# Patient Record
Sex: FEMALE | Race: WHITE | NOT HISPANIC OR LATINO
[De-identification: names, ages, dates, MRNs, and addresses within clinical notes are randomized per-mention and may not be internally consistent; named-entity substitution may affect disease eponyms.]

---

## 2017-05-15 ENCOUNTER — APPOINTMENT (OUTPATIENT)
Dept: ORTHOPEDIC SURGERY | Facility: CLINIC | Age: 77
End: 2017-05-15

## 2017-05-17 ENCOUNTER — APPOINTMENT (OUTPATIENT)
Dept: ORTHOPEDIC SURGERY | Facility: CLINIC | Age: 77
End: 2017-05-17

## 2017-09-25 ENCOUNTER — APPOINTMENT (OUTPATIENT)
Dept: ORTHOPEDIC SURGERY | Facility: CLINIC | Age: 77
End: 2017-09-25

## 2017-11-27 ENCOUNTER — APPOINTMENT (OUTPATIENT)
Dept: ORTHOPEDIC SURGERY | Facility: CLINIC | Age: 77
End: 2017-11-27
Payer: MEDICARE

## 2017-11-27 VITALS
HEART RATE: 74 BPM | HEIGHT: 61 IN | SYSTOLIC BLOOD PRESSURE: 129 MMHG | BODY MASS INDEX: 19.83 KG/M2 | DIASTOLIC BLOOD PRESSURE: 84 MMHG | WEIGHT: 105 LBS

## 2017-11-27 PROCEDURE — 72110 X-RAY EXAM L-2 SPINE 4/>VWS: CPT

## 2017-11-27 PROCEDURE — 99213 OFFICE O/P EST LOW 20 MIN: CPT

## 2018-01-22 ENCOUNTER — APPOINTMENT (OUTPATIENT)
Dept: ORTHOPEDIC SURGERY | Facility: CLINIC | Age: 78
End: 2018-01-22

## 2018-01-29 ENCOUNTER — CHART COPY (OUTPATIENT)
Age: 78
End: 2018-01-29

## 2018-02-28 ENCOUNTER — APPOINTMENT (OUTPATIENT)
Dept: ORTHOPEDIC SURGERY | Facility: CLINIC | Age: 78
End: 2018-02-28
Payer: MEDICARE

## 2018-02-28 VITALS
HEART RATE: 71 BPM | DIASTOLIC BLOOD PRESSURE: 77 MMHG | HEIGHT: 61 IN | WEIGHT: 110 LBS | SYSTOLIC BLOOD PRESSURE: 125 MMHG | BODY MASS INDEX: 20.77 KG/M2

## 2018-02-28 PROCEDURE — 99213 OFFICE O/P EST LOW 20 MIN: CPT

## 2018-11-14 ENCOUNTER — RESULT REVIEW (OUTPATIENT)
Age: 78
End: 2018-11-14

## 2019-04-19 ENCOUNTER — EMERGENCY (EMERGENCY)
Facility: HOSPITAL | Age: 79
LOS: 0 days | Discharge: HOME | End: 2019-04-19
Attending: EMERGENCY MEDICINE | Admitting: EMERGENCY MEDICINE
Payer: MEDICARE

## 2019-04-19 VITALS
WEIGHT: 119.93 LBS | OXYGEN SATURATION: 97 % | DIASTOLIC BLOOD PRESSURE: 69 MMHG | HEIGHT: 63 IN | SYSTOLIC BLOOD PRESSURE: 134 MMHG | RESPIRATION RATE: 17 BRPM | TEMPERATURE: 97 F | HEART RATE: 65 BPM

## 2019-04-19 VITALS
TEMPERATURE: 97 F | SYSTOLIC BLOOD PRESSURE: 141 MMHG | HEART RATE: 73 BPM | OXYGEN SATURATION: 97 % | RESPIRATION RATE: 18 BRPM | DIASTOLIC BLOOD PRESSURE: 81 MMHG

## 2019-04-19 DIAGNOSIS — Z98.82 BREAST IMPLANT STATUS: Chronic | ICD-10-CM

## 2019-04-19 DIAGNOSIS — Z98.89 OTHER SPECIFIED POSTPROCEDURAL STATES: Chronic | ICD-10-CM

## 2019-04-19 DIAGNOSIS — G89.29 OTHER CHRONIC PAIN: ICD-10-CM

## 2019-04-19 DIAGNOSIS — I44.0 ATRIOVENTRICULAR BLOCK, FIRST DEGREE: ICD-10-CM

## 2019-04-19 DIAGNOSIS — M51.36 OTHER INTERVERTEBRAL DISC DEGENERATION, LUMBAR REGION: ICD-10-CM

## 2019-04-19 DIAGNOSIS — E03.9 HYPOTHYROIDISM, UNSPECIFIED: ICD-10-CM

## 2019-04-19 DIAGNOSIS — Z90.710 ACQUIRED ABSENCE OF BOTH CERVIX AND UTERUS: Chronic | ICD-10-CM

## 2019-04-19 DIAGNOSIS — R55 SYNCOPE AND COLLAPSE: ICD-10-CM

## 2019-04-19 DIAGNOSIS — E78.5 HYPERLIPIDEMIA, UNSPECIFIED: ICD-10-CM

## 2019-04-19 DIAGNOSIS — Z90.710 ACQUIRED ABSENCE OF BOTH CERVIX AND UTERUS: ICD-10-CM

## 2019-04-19 LAB
ALBUMIN SERPL ELPH-MCNC: 3.6 G/DL — SIGNIFICANT CHANGE UP (ref 3.3–5)
ALP SERPL-CCNC: 76 U/L — SIGNIFICANT CHANGE UP (ref 40–120)
ALT FLD-CCNC: 32 U/L — SIGNIFICANT CHANGE UP (ref 12–78)
ANION GAP SERPL CALC-SCNC: 6 MMOL/L — SIGNIFICANT CHANGE UP (ref 5–17)
APTT BLD: 28.1 SEC — SIGNIFICANT CHANGE UP (ref 27.5–36.3)
AST SERPL-CCNC: 34 U/L — SIGNIFICANT CHANGE UP (ref 15–37)
BASOPHILS # BLD AUTO: 0.06 K/UL — SIGNIFICANT CHANGE UP (ref 0–0.2)
BASOPHILS NFR BLD AUTO: 0.8 % — SIGNIFICANT CHANGE UP (ref 0–2)
BILIRUB SERPL-MCNC: 0.2 MG/DL — SIGNIFICANT CHANGE UP (ref 0.2–1.2)
BUN SERPL-MCNC: 25 MG/DL — HIGH (ref 7–23)
CALCIUM SERPL-MCNC: 8.8 MG/DL — SIGNIFICANT CHANGE UP (ref 8.5–10.1)
CHLORIDE SERPL-SCNC: 106 MMOL/L — SIGNIFICANT CHANGE UP (ref 96–108)
CO2 SERPL-SCNC: 26 MMOL/L — SIGNIFICANT CHANGE UP (ref 22–31)
CREAT SERPL-MCNC: 0.81 MG/DL — SIGNIFICANT CHANGE UP (ref 0.5–1.3)
EOSINOPHIL # BLD AUTO: 0.52 K/UL — HIGH (ref 0–0.5)
EOSINOPHIL NFR BLD AUTO: 6.5 % — HIGH (ref 0–6)
GLUCOSE SERPL-MCNC: 106 MG/DL — HIGH (ref 70–99)
HCT VFR BLD CALC: 38 % — SIGNIFICANT CHANGE UP (ref 34.5–45)
HGB BLD-MCNC: 12.3 G/DL — SIGNIFICANT CHANGE UP (ref 11.5–15.5)
IMM GRANULOCYTES NFR BLD AUTO: 0.5 % — SIGNIFICANT CHANGE UP (ref 0–1.5)
INR BLD: 1 RATIO — SIGNIFICANT CHANGE UP (ref 0.88–1.16)
LYMPHOCYTES # BLD AUTO: 1.43 K/UL — SIGNIFICANT CHANGE UP (ref 1–3.3)
LYMPHOCYTES # BLD AUTO: 17.9 % — SIGNIFICANT CHANGE UP (ref 13–44)
MCHC RBC-ENTMCNC: 28.3 PG — SIGNIFICANT CHANGE UP (ref 27–34)
MCHC RBC-ENTMCNC: 32.4 GM/DL — SIGNIFICANT CHANGE UP (ref 32–36)
MCV RBC AUTO: 87.6 FL — SIGNIFICANT CHANGE UP (ref 80–100)
MONOCYTES # BLD AUTO: 0.68 K/UL — SIGNIFICANT CHANGE UP (ref 0–0.9)
MONOCYTES NFR BLD AUTO: 8.5 % — SIGNIFICANT CHANGE UP (ref 2–14)
NEUTROPHILS # BLD AUTO: 5.26 K/UL — SIGNIFICANT CHANGE UP (ref 1.8–7.4)
NEUTROPHILS NFR BLD AUTO: 65.8 % — SIGNIFICANT CHANGE UP (ref 43–77)
NRBC # BLD: 0 /100 WBCS — SIGNIFICANT CHANGE UP (ref 0–0)
PLATELET # BLD AUTO: 269 K/UL — SIGNIFICANT CHANGE UP (ref 150–400)
POTASSIUM SERPL-MCNC: 4.4 MMOL/L — SIGNIFICANT CHANGE UP (ref 3.5–5.3)
POTASSIUM SERPL-SCNC: 4.4 MMOL/L — SIGNIFICANT CHANGE UP (ref 3.5–5.3)
PROT SERPL-MCNC: 7 GM/DL — SIGNIFICANT CHANGE UP (ref 6–8.3)
PROTHROM AB SERPL-ACNC: 11.1 SEC — SIGNIFICANT CHANGE UP (ref 10–12.9)
RBC # BLD: 4.34 M/UL — SIGNIFICANT CHANGE UP (ref 3.8–5.2)
RBC # FLD: 15.3 % — HIGH (ref 10.3–14.5)
SODIUM SERPL-SCNC: 138 MMOL/L — SIGNIFICANT CHANGE UP (ref 135–145)
TROPONIN I SERPL-MCNC: <0.015 NG/ML — SIGNIFICANT CHANGE UP (ref 0.01–0.04)
WBC # BLD: 7.99 K/UL — SIGNIFICANT CHANGE UP (ref 3.8–10.5)
WBC # FLD AUTO: 7.99 K/UL — SIGNIFICANT CHANGE UP (ref 3.8–10.5)

## 2019-04-19 PROCEDURE — 99285 EMERGENCY DEPT VISIT HI MDM: CPT

## 2019-04-19 PROCEDURE — 93010 ELECTROCARDIOGRAM REPORT: CPT

## 2019-04-19 PROCEDURE — 71045 X-RAY EXAM CHEST 1 VIEW: CPT | Mod: 26

## 2019-04-19 PROCEDURE — 93926 LOWER EXTREMITY STUDY: CPT | Mod: 26,RT

## 2019-04-19 RX ORDER — ACETAMINOPHEN 500 MG
650 TABLET ORAL ONCE
Qty: 0 | Refills: 0 | Status: COMPLETED | OUTPATIENT
Start: 2019-04-19 | End: 2019-04-19

## 2019-04-19 RX ADMIN — Medication 650 MILLIGRAM(S): at 22:13

## 2019-04-19 NOTE — ED PROVIDER NOTE - OBJECTIVE STATEMENT
78 y/o Female brought to the ED via EMS with friends who report that pt. developed chest tightness while eating dinner this evening.  Took 1 SL NTG tablet and immediately felt nausea and dizziness.  Per friends, pt's head slumped forward.  No fall from chair.  (+) LOC for few seconds.  Neg seizure activity.  Friends lifted pt's head and she became alert s/p few seconds.  Reports HA now.

## 2019-04-19 NOTE — ED PROVIDER NOTE - GASTROINTESTINAL, MLM
Abdomen soft, non-tender, no guarding.      Right groin with 1.5 cm, mobile, non-tender area s/p cath insertion. Neg erythema.  Neg fluctuance.

## 2019-04-19 NOTE — ED PROVIDER NOTE - PROGRESS NOTE DETAILS
Discussed case with Dr. Damon.  He placed call to pt.'s Cardiologist (dr. Ramirez) through [facility].  Yanira Shetty PA-C D/W Ashley ([phone]) -- confirms recent cath with patent stent.  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  Would agree with obs overnight, serial trops. D/W Ashley ([phone]) -- confirms recent cath with patent stent.  Likely has microvascular angina, has had similar episode in the past related to nitrates, calcium channel blockers, dehydration.  Provided trop and workup negative, would be comfortable with discharge.  Patient strongly wishes to be discharged.  Currently asymptomatic.  Likely discharge.

## 2019-04-19 NOTE — ED PROVIDER NOTE - NSFOLLOWUPINSTRUCTIONS_ED_ALL_ED_FT
Follow-up with Dr. Ramirez  Return with any new/recurrent symptoms.   See printed "syncope" instructions

## 2019-04-19 NOTE — ED PROVIDER NOTE - CLINICAL SUMMARY MEDICAL DECISION MAKING FREE TEXT BOX
syncope - ?rel to nitrates, ?ischemia, ?dehydration  D/w cardiology -- agrees with fluids, trop overnight. syncope - ?rel to nitrates, ?ischemia, ?dehydration  Labs, CXR, ECG

## 2019-04-19 NOTE — ED PROVIDER NOTE - ATTENDING CONTRIBUTION TO CARE
79F BIBA s/p syncope -- took NTG for chest pain, felt dizzy and had brief syncopal episode.  Similar episodes in the past.  Presently asymptomatic.  Plan: labs, ECG, contact patient's cardiologist

## 2019-04-19 NOTE — ED ADULT NURSE NOTE - NSSUSCREENINGQ5_ED_ALL_ED
pt ambulated from rm 17 to the bathroom s any difficulty.  bl equal sensation to touch and pin prick to lower extremities. No

## 2019-04-19 NOTE — ED PROVIDER NOTE - PMH
Chronic pain    Constipation    Degenerative disc disease, lumbar    Dry eye of left side    Dyslipidemia    H/O corneal abrasion  left  Hypothyroidism    Insomnia

## 2019-04-19 NOTE — ED ADULT NURSE NOTE - NSIMPLEMENTINTERV_GEN_ALL_ED
Implemented All Fall Risk Interventions:  [place] to call system. Call bell, personal items and telephone within reach. Instruct patient to call for assistance. Room bathroom lighting operational. Non-slip footwear when patient is off stretcher. Physically safe environment: no spills, clutter or unnecessary equipment. Stretcher in lowest position, wheels locked, appropriate side rails in place. Provide visual cue, wrist band, yellow gown, etc. Monitor gait and stability. Monitor for mental status changes and reorient to person, place, and time. Review medications for side effects contributing to fall risk. Reinforce activity limits and safety measures with patient and family.

## 2019-04-19 NOTE — ED PROVIDER NOTE - PSH
S/P breast augmentation  29 ya, reconstruction 2015  S/P eye surgery  49 ya, blepharoplasty  S/P hysterectomy  age 39  S/P sinus surgery  19 ya  S/P tendon repair  left hand, 2015

## 2019-04-19 NOTE — ED ADULT NURSE NOTE - CHIEF COMPLAINT QUOTE
Pt presents to ER s/p syncopal episode. pt reports having an argument at dinner and then feeling chest discomfort; pt took one sublingual nitro and then felt dizzy and passed out for a few seconds in a chair. Event was witnessed by family. Denies fall

## 2019-04-19 NOTE — ED ADULT NURSE REASSESSMENT NOTE - NS ED NURSE REASSESS COMMENT FT1
report received from previous RN. patient resting comfortably in stretcher in no acute distress. A&Ox4. color good. skin warm and dry. respirations even and unlabored. NSR 60-70s on CM. patient awaiting sono results then pending discharge. medicated as ordered for headache. VSS. call bell within reach. family at bedside.

## 2019-07-08 ENCOUNTER — APPOINTMENT (OUTPATIENT)
Dept: ORTHOPEDIC SURGERY | Facility: CLINIC | Age: 79
End: 2019-07-08

## 2019-07-22 ENCOUNTER — APPOINTMENT (OUTPATIENT)
Dept: ORTHOPEDIC SURGERY | Facility: CLINIC | Age: 79
End: 2019-07-22
Payer: MEDICARE

## 2019-07-22 PROCEDURE — 20551 NJX 1 TENDON ORIGIN/INSJ: CPT

## 2019-07-22 PROCEDURE — 99214 OFFICE O/P EST MOD 30 MIN: CPT | Mod: 25

## 2019-07-24 RX ORDER — LIDOCAINE 5% 700 MG/1
5 PATCH TOPICAL
Qty: 20 | Refills: 0 | Status: ACTIVE | COMMUNITY
Start: 2019-07-24 | End: 1900-01-01

## 2019-07-25 ENCOUNTER — CHART COPY (OUTPATIENT)
Age: 79
End: 2019-07-25

## 2020-01-15 ENCOUNTER — APPOINTMENT (OUTPATIENT)
Dept: ORTHOPEDIC SURGERY | Facility: CLINIC | Age: 80
End: 2020-01-15

## 2020-03-09 ENCOUNTER — APPOINTMENT (OUTPATIENT)
Dept: OPHTHALMOLOGY | Facility: CLINIC | Age: 80
End: 2020-03-09

## 2020-06-23 ENCOUNTER — RESULT REVIEW (OUTPATIENT)
Age: 80
End: 2020-06-23

## 2020-09-10 ENCOUNTER — RECORD ABSTRACTING (OUTPATIENT)
Age: 80
End: 2020-09-10

## 2020-09-10 ENCOUNTER — APPOINTMENT (OUTPATIENT)
Dept: OTOLARYNGOLOGY | Facility: CLINIC | Age: 80
End: 2020-09-10

## 2020-09-10 DIAGNOSIS — Z86.79 PERSONAL HISTORY OF OTHER DISEASES OF THE CIRCULATORY SYSTEM: ICD-10-CM

## 2020-09-10 DIAGNOSIS — Z86.2 PERSONAL HISTORY OF DISEASES OF THE BLOOD AND BLOOD-FORMING ORGANS AND CERTAIN DISORDERS INVOLVING THE IMMUNE MECHANISM: ICD-10-CM

## 2020-09-10 DIAGNOSIS — J31.0 CHRONIC RHINITIS: ICD-10-CM

## 2020-09-10 DIAGNOSIS — Z87.09 PERSONAL HISTORY OF OTHER DISEASES OF THE RESPIRATORY SYSTEM: ICD-10-CM

## 2020-09-10 DIAGNOSIS — H61.21 IMPACTED CERUMEN, RIGHT EAR: ICD-10-CM

## 2020-09-10 RX ORDER — METOPROLOL SUCCINATE 100 MG/1
100 TABLET, EXTENDED RELEASE ORAL
Refills: 0 | Status: ACTIVE | COMMUNITY

## 2020-09-10 RX ORDER — PANTOPRAZOLE 40 MG/1
40 TABLET, DELAYED RELEASE ORAL
Refills: 0 | Status: ACTIVE | COMMUNITY

## 2020-09-10 RX ORDER — GABAPENTIN 600 MG/1
600 TABLET, COATED ORAL
Refills: 0 | Status: ACTIVE | COMMUNITY

## 2020-09-10 RX ORDER — RANITIDINE 150 MG/1
150 TABLET ORAL
Refills: 0 | Status: ACTIVE | COMMUNITY

## 2020-09-10 RX ORDER — MUPIROCIN 20 MG/G
2 OINTMENT TOPICAL
Refills: 0 | Status: ACTIVE | COMMUNITY

## 2020-09-10 RX ORDER — LINACLOTIDE 145 UG/1
145 CAPSULE, GELATIN COATED ORAL
Refills: 0 | Status: ACTIVE | COMMUNITY

## 2020-09-10 RX ORDER — TICAGRELOR 90 MG/1
90 TABLET ORAL
Refills: 0 | Status: ACTIVE | COMMUNITY

## 2020-09-10 RX ORDER — TIZANIDINE 4 MG/1
4 TABLET ORAL
Refills: 0 | Status: ACTIVE | COMMUNITY

## 2020-09-10 RX ORDER — LEVOTHYROXINE SODIUM 0.07 MG/1
75 TABLET ORAL
Refills: 0 | Status: ACTIVE | COMMUNITY

## 2020-09-10 RX ORDER — SIMVASTATIN 20 MG/1
20 TABLET, FILM COATED ORAL
Refills: 0 | Status: ACTIVE | COMMUNITY

## 2020-09-17 ENCOUNTER — APPOINTMENT (OUTPATIENT)
Dept: OTOLARYNGOLOGY | Facility: CLINIC | Age: 80
End: 2020-09-17

## 2020-09-24 ENCOUNTER — APPOINTMENT (OUTPATIENT)
Dept: OTOLARYNGOLOGY | Facility: CLINIC | Age: 80
End: 2020-09-24
Payer: MEDICARE

## 2020-09-24 VITALS
HEIGHT: 61 IN | TEMPERATURE: 96.8 F | WEIGHT: 110 LBS | BODY MASS INDEX: 20.77 KG/M2 | DIASTOLIC BLOOD PRESSURE: 85 MMHG | HEART RATE: 67 BPM | SYSTOLIC BLOOD PRESSURE: 132 MMHG

## 2020-09-24 DIAGNOSIS — J34.2 DEVIATED NASAL SEPTUM: ICD-10-CM

## 2020-09-24 PROCEDURE — 30903 CONTROL OF NOSEBLEED: CPT

## 2020-09-24 PROCEDURE — 99214 OFFICE O/P EST MOD 30 MIN: CPT | Mod: 25

## 2020-09-24 NOTE — PHYSICAL EXAM
[Normal] : mucosa is normal [Midline] : trachea located in midline position [de-identified] : scarred face/ narrow vestbules/ atrophic rhinitis/ exposed Kisselbach vessels/ cauterisation done

## 2020-09-24 NOTE — HISTORY OF PRESENT ILLNESS
[de-identified] : recurrent left sided nose bleed for a while\par taking ASA\par medical hx reviewed\par hx of sinus surgery

## 2020-09-24 NOTE — ASSESSMENT
[FreeTextEntry1] : recurrent left epistaxis\par coagulopathy\par cauterisation done\par mupirocin bid\par f/u 10 days\par will consider CT due to previous surgery

## 2020-09-24 NOTE — REVIEW OF SYSTEMS
[Seasonal Allergies] : seasonal allergies [Nasal Congestion] : nasal congestion [Hoarseness] : hoarseness [Throat Clearing] : throat clearing [Easy Bleeding] : a tendency for easy bleeding [Patient Intake Form Reviewed] : Patient intake form was reviewed [FreeTextEntry1] : marino

## 2020-09-25 RX ORDER — MUPIROCIN 20 MG/G
2 OINTMENT TOPICAL 3 TIMES DAILY
Qty: 1 | Refills: 0 | Status: ACTIVE | COMMUNITY
Start: 2020-09-25 | End: 1900-01-01

## 2020-10-08 ENCOUNTER — APPOINTMENT (OUTPATIENT)
Dept: OTOLARYNGOLOGY | Facility: CLINIC | Age: 80
End: 2020-10-08
Payer: MEDICARE

## 2020-10-08 VITALS
HEIGHT: 61 IN | BODY MASS INDEX: 21.9 KG/M2 | TEMPERATURE: 97.3 F | HEART RATE: 66 BPM | WEIGHT: 116 LBS | DIASTOLIC BLOOD PRESSURE: 72 MMHG | SYSTOLIC BLOOD PRESSURE: 161 MMHG

## 2020-10-08 PROCEDURE — 99214 OFFICE O/P EST MOD 30 MIN: CPT

## 2020-10-08 NOTE — ASSESSMENT
[FreeTextEntry1] : EPISTAXIS IMPROVED\par RHINITIS\par CONTINUE MUPIROCIN\par F/U 2 MONTHS\par HUMIDIFIER\par

## 2020-10-08 NOTE — PHYSICAL EXAM
[FreeTextEntry1] : SCARRED FACIAL SKIN [de-identified] : HEALING KISSELBACH CAUTERISED AREA/ DRY MUCOSA [Normal] : mucosa is normal [Midline] : trachea located in midline position

## 2021-01-07 ENCOUNTER — APPOINTMENT (OUTPATIENT)
Dept: OPHTHALMOLOGY | Facility: CLINIC | Age: 81
End: 2021-01-07

## 2021-01-20 ENCOUNTER — APPOINTMENT (OUTPATIENT)
Dept: OTOLARYNGOLOGY | Facility: CLINIC | Age: 81
End: 2021-01-20

## 2021-02-02 ENCOUNTER — APPOINTMENT (OUTPATIENT)
Dept: OTOLARYNGOLOGY | Facility: CLINIC | Age: 81
End: 2021-02-02
Payer: MEDICARE

## 2021-02-02 VITALS
WEIGHT: 118 LBS | DIASTOLIC BLOOD PRESSURE: 70 MMHG | SYSTOLIC BLOOD PRESSURE: 148 MMHG | TEMPERATURE: 97.2 F | BODY MASS INDEX: 22.28 KG/M2 | HEIGHT: 61 IN

## 2021-02-02 PROCEDURE — 99214 OFFICE O/P EST MOD 30 MIN: CPT | Mod: 25

## 2021-02-02 PROCEDURE — 30903 CONTROL OF NOSEBLEED: CPT

## 2021-02-02 RX ORDER — MUPIROCIN 20 MG/G
2 OINTMENT TOPICAL 3 TIMES DAILY
Qty: 1 | Refills: 3 | Status: ACTIVE | COMMUNITY
Start: 2021-02-02 | End: 1900-01-01

## 2021-02-02 NOTE — HISTORY OF PRESENT ILLNESS
[de-identified] : EPISTAXIS LEFT SIDE; MILD\par SIMILAR EPISODE IN THE PAST\par MEDICAL AND SURGICAL HX REVIEWED\par

## 2021-02-02 NOTE — ASSESSMENT
[FreeTextEntry1] : LEFT EPISTAXIS\par RHINITS\par DEFORMED EXTERNAL NOSE\par HUMIDIFIER\par MUPIROCIN\par F/U 2 WEEKS\par EPISTAXIS INSTRUCTIONS

## 2021-02-02 NOTE — PHYSICAL EXAM
[FreeTextEntry1] : SCARRED FACIAL SKIN [de-identified] : WEAK KISSELBACH CAUTERISED AREA/ DRY MUCOSA [Normal] : mucosa is normal [Midline] : trachea located in midline position

## 2021-02-26 ENCOUNTER — APPOINTMENT (OUTPATIENT)
Dept: OTOLARYNGOLOGY | Facility: CLINIC | Age: 81
End: 2021-02-26
Payer: MEDICARE

## 2021-02-26 VITALS
TEMPERATURE: 96.4 F | DIASTOLIC BLOOD PRESSURE: 75 MMHG | HEIGHT: 61 IN | BODY MASS INDEX: 22.28 KG/M2 | WEIGHT: 118 LBS | HEART RATE: 73 BPM | SYSTOLIC BLOOD PRESSURE: 190 MMHG

## 2021-02-26 PROCEDURE — 99213 OFFICE O/P EST LOW 20 MIN: CPT | Mod: 25

## 2021-02-26 PROCEDURE — 30903 CONTROL OF NOSEBLEED: CPT

## 2021-02-26 NOTE — HISTORY OF PRESENT ILLNESS
[de-identified] : EPISTAXIS LEFT SIDE; MILD\par SIMILAR EPISODE IN THE PAST\par MEDICAL AND SURGICAL HX REVIEWED\par 
General

## 2021-02-26 NOTE — PHYSICAL EXAM
[FreeTextEntry1] : SCARRED FACIAL SKIN [de-identified] : WEAK KISSELBACH CAUTERISED / DRY MUCOSA [Normal] : mucosa is normal [Midline] : trachea located in midline position

## 2022-03-17 ENCOUNTER — APPOINTMENT (OUTPATIENT)
Dept: OTOLARYNGOLOGY | Facility: CLINIC | Age: 82
End: 2022-03-17
Payer: MEDICARE

## 2022-03-17 VITALS
WEIGHT: 118 LBS | HEIGHT: 61 IN | BODY MASS INDEX: 22.28 KG/M2 | DIASTOLIC BLOOD PRESSURE: 76 MMHG | SYSTOLIC BLOOD PRESSURE: 124 MMHG | HEART RATE: 79 BPM

## 2022-03-17 PROCEDURE — 30901 CONTROL OF NOSEBLEED: CPT

## 2022-03-17 PROCEDURE — 99213 OFFICE O/P EST LOW 20 MIN: CPT | Mod: 25

## 2022-03-17 NOTE — PHYSICAL EXAM
[FreeTextEntry1] : SCARRED FACIAL SKIN [de-identified] : WEAK KISSELBACH CAUTERISED / DRY MUCOSA [Normal] : mucosa is normal [Midline] : trachea located in midline position

## 2022-03-17 NOTE — HISTORY OF PRESENT ILLNESS
[de-identified] : EPISTAXIS LEFT SIDE; MILD\par SON HAD AORTIC ANEURYSM SURGERY AT [place] AND HAD LOTS OF STRESS LATELY\par SIMILAR EPISODE IN THE PAST\par MEDICAL AND SURGICAL HX REVIEWED\par

## 2022-04-20 ENCOUNTER — APPOINTMENT (OUTPATIENT)
Dept: OTOLARYNGOLOGY | Facility: CLINIC | Age: 82
End: 2022-04-20
Payer: MEDICARE

## 2022-04-20 VITALS
HEIGHT: 61 IN | WEIGHT: 118 LBS | HEART RATE: 71 BPM | SYSTOLIC BLOOD PRESSURE: 136 MMHG | BODY MASS INDEX: 22.28 KG/M2 | DIASTOLIC BLOOD PRESSURE: 75 MMHG

## 2022-04-20 PROCEDURE — 99213 OFFICE O/P EST LOW 20 MIN: CPT | Mod: 25

## 2022-04-20 PROCEDURE — 30901 CONTROL OF NOSEBLEED: CPT

## 2022-04-20 NOTE — PHYSICAL EXAM
[FreeTextEntry1] : SCARRED FACIAL SKIN [de-identified] : WEAK KISSELBACH CAUTERISED / DRY MUCOSA [Normal] : mucosa is normal [Midline] : trachea located in midline position

## 2022-04-20 NOTE — HISTORY OF PRESENT ILLNESS
[de-identified] : EPISTAXIS LEFT SIDE; MILD/ occaisionally\par SIMILAR EPISODE IN THE PAST\par MEDICAL AND SURGICAL HX REVIEWED\par

## 2022-07-27 ENCOUNTER — NON-APPOINTMENT (OUTPATIENT)
Age: 82
End: 2022-07-27

## 2022-07-29 ENCOUNTER — APPOINTMENT (OUTPATIENT)
Dept: ORTHOPEDIC SURGERY | Facility: CLINIC | Age: 82
End: 2022-07-29
Payer: MEDICARE

## 2022-07-29 VITALS — HEIGHT: 61 IN | BODY MASS INDEX: 22.28 KG/M2 | WEIGHT: 118 LBS

## 2022-07-29 PROCEDURE — 72040 X-RAY EXAM NECK SPINE 2-3 VW: CPT

## 2022-07-29 PROCEDURE — 23570 CLTX SCAPULAR FX W/O MNPJ: CPT

## 2022-07-29 PROCEDURE — 99203 OFFICE O/P NEW LOW 30 MIN: CPT

## 2022-07-29 PROCEDURE — 73010 X-RAY EXAM OF SHOULDER BLADE: CPT | Mod: RT

## 2022-07-29 PROCEDURE — 73030 X-RAY EXAM OF SHOULDER: CPT | Mod: RT

## 2022-07-29 NOTE — PHYSICAL EXAM
[Right] : right shoulder [Fracture] : Fracture [] : no ecchymosis [FreeTextEntry9] : able to pendulum swing with pain [de-identified] : n/a [FreeTextEntry1] : fx scapula [FreeTextEntry5] : long fx body and glenoid of scapula

## 2022-07-29 NOTE — DISCUSSION/SUMMARY
[de-identified] : will use sling for immobilization. May take arm out of sling to shower, may start early ROM as tolerated. Tylenol or Advil for pain

## 2022-07-29 NOTE — HISTORY OF PRESENT ILLNESS
[8] : 8 [Sharp] : sharp [Constant] : constant [de-identified] : 07/29/2022: 82 year old female here for eval of Right shoulder pain after tripping/ falling at home 2 days ago. Went to  yesterday who treated her wounds and had concern for scapula fracture. Having pain radiating down arm to hand. Denies N/T. \par Not on any pain medication.\par \par RHD \par \par  [] : Post Surgical Visit: no [FreeTextEntry1] : RT scapula  [FreeTextEntry3] : 7/27/22 [FreeTextEntry5] : Pt states she fell down in her house 2 days ago. Went to urgent care and had xrays done.  [FreeTextEntry7] : down the arm [de-identified] : [facility] [de-identified] : Xray

## 2022-08-01 ENCOUNTER — NON-APPOINTMENT (OUTPATIENT)
Age: 82
End: 2022-08-01

## 2022-08-18 ENCOUNTER — APPOINTMENT (OUTPATIENT)
Dept: ORTHOPEDIC SURGERY | Facility: CLINIC | Age: 82
End: 2022-08-18

## 2022-08-18 VITALS — WEIGHT: 118 LBS | BODY MASS INDEX: 22.28 KG/M2 | HEIGHT: 61 IN

## 2022-08-18 PROCEDURE — 99024 POSTOP FOLLOW-UP VISIT: CPT

## 2022-08-18 PROCEDURE — 73010 X-RAY EXAM OF SHOULDER BLADE: CPT | Mod: RT

## 2022-08-18 NOTE — PHYSICAL EXAM
[] : no erythema [Right] : right shoulder [Fracture] : Fracture [FreeTextEntry9] : na [de-identified] : na

## 2022-08-18 NOTE — ASSESSMENT
[FreeTextEntry1] : right scapula fracture, mild dispalcement, articular surface well aligned.\par Nature of the fx reviewed with the patient and son on the phone.\par Sling for one more week.\par OTC Tylenol as needed.\par Return next week for repeat Xray right scapula.\par

## 2022-08-18 NOTE — HISTORY OF PRESENT ILLNESS
[9] : 9 [6] : 6 [Throbbing] : throbbing [de-identified] : This is an 82 year old female that fell at home on 7/27/22, she fell at home. She was seen at urgent care and also evaluated by Dr Weiss, underwent Xrays and instructed she fractured her scapula. Pain has slightly improved since the fall. She denies any radicular complaints. She was maintained in a sling for a week or so and then discontinued to start gentle ROM.  [] : no [FreeTextEntry1] : R shoulder/Scap [FreeTextEntry5] : pt states she has a torn R scapula [de-identified] : sling

## 2022-08-19 ENCOUNTER — APPOINTMENT (OUTPATIENT)
Dept: ORTHOPEDIC SURGERY | Facility: CLINIC | Age: 82
End: 2022-08-19

## 2022-08-30 ENCOUNTER — APPOINTMENT (OUTPATIENT)
Dept: ORTHOPEDIC SURGERY | Facility: CLINIC | Age: 82
End: 2022-08-30

## 2022-08-30 VITALS — WEIGHT: 118 LBS | HEIGHT: 61 IN | BODY MASS INDEX: 22.28 KG/M2

## 2022-08-30 PROCEDURE — 99024 POSTOP FOLLOW-UP VISIT: CPT

## 2022-08-30 PROCEDURE — 73010 X-RAY EXAM OF SHOULDER BLADE: CPT | Mod: RT

## 2022-08-30 NOTE — HISTORY OF PRESENT ILLNESS
[9] : 9 [6] : 6 [Throbbing] : throbbing [de-identified] : 8/30/22: Here to f/u on right shoulder/scap. Pain has improved, wearing sling.\par \par This is an 82 year old female that fell at home on 7/27/22, she fell at home. She was seen at urgent care and also evaluated by Dr Weiss, underwent Xrays and instructed she fractured her scapula. Pain has slightly improved since the fall. She denies any radicular complaints. She was maintained in a sling for a week or so and then discontinued to start gentle ROM.  [] : no [FreeTextEntry1] : R shoulder/Scap [FreeTextEntry5] : pt states she has a torn R scapula [de-identified] : sling

## 2022-08-30 NOTE — ASSESSMENT
[FreeTextEntry1] : right scapula fracture, mild displacement, articular surface well aligned.\par Xrays reviewed.\par D/c sling.\par PT for PROM, AROM as tolerated.\par OK for light ADLs.\par RTO 4 weeks with xrays. \par \par

## 2022-09-13 ENCOUNTER — APPOINTMENT (OUTPATIENT)
Dept: ORTHOPEDIC SURGERY | Facility: CLINIC | Age: 82
End: 2022-09-13

## 2022-09-13 VITALS — HEIGHT: 61 IN | BODY MASS INDEX: 22.28 KG/M2 | WEIGHT: 118 LBS

## 2022-09-13 PROCEDURE — 73010 X-RAY EXAM OF SHOULDER BLADE: CPT | Mod: RT

## 2022-09-13 PROCEDURE — 99024 POSTOP FOLLOW-UP VISIT: CPT

## 2022-09-13 NOTE — PHYSICAL EXAM
[] : motor and sensory intact distally [Right] : right shoulder [The fracture is in acceptable alignment. There is progression in healing seen] : The fracture is in acceptable alignment. There is progression in healing seen [FreeTextEntry9] : FE: 90 [de-identified] : na

## 2022-09-13 NOTE — ASSESSMENT
[FreeTextEntry1] : right scapula fracture, mild displacement, articular surface well aligned.\par Xrays reviewed.\par D/c sling.\par PT for PROM, AROM as tolerated.\par WB up to 10 lbs.\par RTO 6 weeks with xrays. \par \par

## 2022-09-13 NOTE — HISTORY OF PRESENT ILLNESS
[9] : 9 [6] : 6 [Throbbing] : throbbing [de-identified] : 9/13/22: Here for follow up.  She is in PT but she is unhappy with them. \par \par 8/30/22: Here to f/u on right shoulder/scap. Pain has improved, wearing sling.\par \par 8/18/22:  This is an 82 year old female that fell at home on 7/27/22, she fell at home. She was seen at urgent care and also evaluated by Dr Weiss, underwent Xrays and instructed she fractured her scapula. Pain has slightly improved since the fall. She denies any radicular complaints. She was maintained in a sling for a week or so and then discontinued to start gentle ROM.  [] : no [FreeTextEntry1] : R shoulder/Scap [FreeTextEntry5] : pt states she has a torn R scapula [de-identified] : sling

## 2022-09-29 ENCOUNTER — APPOINTMENT (OUTPATIENT)
Dept: ORTHOPEDIC SURGERY | Facility: CLINIC | Age: 82
End: 2022-09-29

## 2022-09-29 VITALS — BODY MASS INDEX: 22.28 KG/M2 | WEIGHT: 118 LBS | HEIGHT: 61 IN

## 2022-09-29 PROCEDURE — 99024 POSTOP FOLLOW-UP VISIT: CPT

## 2022-09-29 NOTE — PHYSICAL EXAM
[Right] : right shoulder [The fracture is in acceptable alignment. There is progression in healing seen] : The fracture is in acceptable alignment. There is progression in healing seen [] : no erythema [FreeTextEntry9] : FE: 90 [de-identified] : na

## 2022-09-29 NOTE — HISTORY OF PRESENT ILLNESS
[5] : 5 [Dull/Aching] : dull/aching [Constant] : constant [Meds] : meds [Bending forward] : bending forward [Physical therapy] : physical therapy [de-identified] : 9/29/22: Here for follow up. She has questions about driving and PT.\par \par 9/13/22: Here for follow up.  She is in PT but she is unhappy with them. \par \par 8/30/22: Here to f/u on right shoulder/scap. Pain has improved, wearing sling.\par \par 8/18/22:  This is an 82 year old female that fell at home on 7/27/22, she fell at home. She was seen at urgent care and also evaluated by Dr Weiss, underwent Xrays and instructed she fractured her scapula. Pain has slightly improved since the fall. She denies any radicular complaints. She was maintained in a sling for a week or so and then discontinued to start gentle ROM.  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

## 2022-09-29 NOTE — ASSESSMENT
[FreeTextEntry1] : Right scapula fracture, mild displacement, articular surface well aligned.\par Xrays reviewed.\par PT for PROM, AROM as tolerated.\par WB up to 10 lbs.\par She will switch PT facilities.\par Advised not to drive until she feels she can safely control her car with both arms.\par RTO 4 weeks with xrays. \par \par

## 2022-10-25 ENCOUNTER — APPOINTMENT (OUTPATIENT)
Dept: ORTHOPEDIC SURGERY | Facility: CLINIC | Age: 82
End: 2022-10-25

## 2022-11-08 ENCOUNTER — APPOINTMENT (OUTPATIENT)
Dept: ORTHOPEDIC SURGERY | Facility: CLINIC | Age: 82
End: 2022-11-08
Payer: MEDICARE

## 2022-11-08 VITALS — BODY MASS INDEX: 22.28 KG/M2 | WEIGHT: 118 LBS | HEIGHT: 61 IN

## 2022-11-08 PROCEDURE — 73010 X-RAY EXAM OF SHOULDER BLADE: CPT | Mod: RT

## 2022-11-08 PROCEDURE — 73030 X-RAY EXAM OF SHOULDER: CPT | Mod: RT

## 2022-11-08 PROCEDURE — 99213 OFFICE O/P EST LOW 20 MIN: CPT

## 2022-11-08 NOTE — ASSESSMENT
[FreeTextEntry1] : Right scapula fracture, mild displacement, articular surface well aligned.\par Xrays reviewed.\par PT for PROM, AROM as tolerated.\par WBAT.\par RTO 3 months with xrays. \par \par

## 2022-11-08 NOTE — HISTORY OF PRESENT ILLNESS
[5] : 5 [Dull/Aching] : dull/aching [Constant] : constant [Meds] : meds [Bending forward] : bending forward [Physical therapy] : physical therapy [de-identified] : 11/8/22: Here foe follow up. She is in PT.\par \par 9/29/22: Here for follow up. She has questions about driving and PT.\par \par 9/13/22: Here for follow up.  She is in PT but she is unhappy with them. \par \par 8/30/22: Here to f/u on right shoulder/scap. Pain has improved, wearing sling.\par \par 8/18/22:  This is an 82 year old female that fell at home on 7/27/22, she fell at home. She was seen at urgent care and also evaluated by Dr Weiss, underwent Xrays and instructed she fractured her scapula. Pain has slightly improved since the fall. She denies any radicular complaints. She was maintained in a sling for a week or so and then discontinued to start gentle ROM.  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

## 2022-11-08 NOTE — PHYSICAL EXAM
[Right] : right shoulder [] : no erythema [The fracture is in acceptable alignment. There is progression in healing seen] : The fracture is in acceptable alignment. There is progression in healing seen [FreeTextEntry9] : FE: 130P\par ER 20P [de-identified] : na

## 2022-11-14 ENCOUNTER — APPOINTMENT (OUTPATIENT)
Dept: ORTHOPEDIC SURGERY | Facility: CLINIC | Age: 82
End: 2022-11-14

## 2022-12-12 ENCOUNTER — APPOINTMENT (OUTPATIENT)
Dept: ORTHOPEDIC SURGERY | Facility: CLINIC | Age: 82
End: 2022-12-12

## 2022-12-12 VITALS — BODY MASS INDEX: 22.28 KG/M2 | HEIGHT: 61 IN | WEIGHT: 118 LBS

## 2022-12-12 VITALS — BODY MASS INDEX: 22.28 KG/M2 | WEIGHT: 118 LBS | HEIGHT: 61 IN

## 2022-12-12 PROCEDURE — J3490N: CUSTOM

## 2022-12-12 PROCEDURE — 99213 OFFICE O/P EST LOW 20 MIN: CPT | Mod: 25

## 2022-12-12 PROCEDURE — 73562 X-RAY EXAM OF KNEE 3: CPT | Mod: LT

## 2022-12-12 PROCEDURE — 20610 DRAIN/INJ JOINT/BURSA W/O US: CPT

## 2022-12-12 NOTE — ASSESSMENT
[FreeTextEntry1] : ATRAUMATIC LEFT KNEE PAIN WITH MILD DEGEN CHANGES ON XRAYS\par NO RED FLAGS\par CSI FRO RELIEF\par VISCO IF PERSISTS IN 2 WEEKS

## 2022-12-12 NOTE — HISTORY OF PRESENT ILLNESS
[7] : 7 [5] : 5 [Dull/Aching] : dull/aching [Sharp] : sharp [Intermittent] : intermittent [Nothing helps with pain getting better] : Nothing helps with pain getting better [de-identified] : 12/12/22: Patient is an 83 yo female c/o left knee pain for 6 months with no specific injury. No n/t. Taking tylenol as needed for pain. Pain is worse with activity. Has seen an ortho in past who gave injection which gave some relief. No previous surgeries to left knee.  [] : no [FreeTextEntry1] : LT Knee  [FreeTextEntry5] : Hx of OA, would like to discuss injections \par

## 2022-12-12 NOTE — PROCEDURE
[FreeTextEntry3] : - Large joint injection was performed of the LEFT knee. \par - The indication for this procedure was pain and inflammation. Patient has tried OTC's including aspirin, Ibuprofen, Aleve, etc or prescription NSAIDS, and/or exercises at home and/or physical therapy without satisfactory response, patient had decreased mobility in the joint and the risks benefits, and alternatives have been discussed, and verbal consent was obtained. The site was prepped with alcohol, betadine, ethyl chloride sprayed topically and sterile technique used. \par - An injection of Betamethasone 2cc; Marcaine 5cc injected.\par - Patient was advised to call if redness, pain or fever occur, apply ice for 15 minutes out of every hour for the next 12-24 hours as tolerated and patient was advised to rest the joint(s) for 2 days. \par \par

## 2022-12-12 NOTE — IMAGING
[de-identified] : Left knee Varus deformity\par Mild effusion, no warmth, no ecchymosis\par Medial joint line tenderness to palpation \par Range of motion 5-110 with associated crepitus\par 5/5 quadriceps and hamstring strength\par Ligamentously stable\par Motor and sensory intact distally\par Mildly antalgic gait\par Negative Marni\par  [Left] : left knee [All Views] : anteroposterior, lateral, skyline, and anteroposterior standing [Degenerative change] : Degenerative change

## 2023-01-09 ENCOUNTER — APPOINTMENT (OUTPATIENT)
Dept: ORTHOPEDIC SURGERY | Facility: CLINIC | Age: 83
End: 2023-01-09
Payer: MEDICARE

## 2023-01-09 PROCEDURE — 99213 OFFICE O/P EST LOW 20 MIN: CPT | Mod: 25

## 2023-01-09 PROCEDURE — 20610 DRAIN/INJ JOINT/BURSA W/O US: CPT | Mod: LT

## 2023-01-09 NOTE — HISTORY OF PRESENT ILLNESS
[7] : 7 [5] : 5 [Dull/Aching] : dull/aching [Sharp] : sharp [Intermittent] : intermittent [Nothing helps with pain getting better] : Nothing helps with pain getting better [de-identified] : 12/12/22: Patient is an 81 yo female c/o left knee pain for 6 months with no specific injury. No n/t. Taking tylenol as needed for pain. Pain is worse with activity. Has seen an ortho in past who gave injection which gave some relief. No previous surgeries to left knee. \par \par 01/09/23 here to start left knee euflexxa  [] : no [FreeTextEntry1] : LT Knee  [FreeTextEntry5] : Hx of OA, would like to discuss injections \par  [de-identified] : csi

## 2023-01-09 NOTE — PROCEDURE
[FreeTextEntry3] : Large joint injection was performed of the left knee. \par The indication for this procedure was pain, inflammation and x-ray evidence of Osteoarthritis on this or prior visit. The site was prepped with alcohol, betadine, ethyl chloride sprayed topically and sterile technique used. \par An injection of EUFLEXXA 2ml, series # 1 was used.  \par Patient was advised to call if redness, pain or fever occur, apply ice for 15 minutes out of every hour for the next 12-24 hours as tolerated and patient was advised to rest the joint(s) for 2 days. Patient has tried OTC's including aspirin, Ibuprofen, Aleve, etc or prescription NSAIDS, and/or exercises at home and/or physical therapy without satisfactory response, patient had decreased mobility in the joint and the risks benefits, and alternatives have been discussed, and verbal consent was obtained.\par

## 2023-01-16 ENCOUNTER — APPOINTMENT (OUTPATIENT)
Dept: ORTHOPEDIC SURGERY | Facility: CLINIC | Age: 83
End: 2023-01-16

## 2023-01-16 ENCOUNTER — APPOINTMENT (OUTPATIENT)
Dept: ORTHOPEDIC SURGERY | Facility: CLINIC | Age: 83
End: 2023-01-16
Payer: MEDICARE

## 2023-01-16 PROCEDURE — 20610 DRAIN/INJ JOINT/BURSA W/O US: CPT

## 2023-01-16 PROCEDURE — 99213 OFFICE O/P EST LOW 20 MIN: CPT | Mod: 25

## 2023-01-16 NOTE — HISTORY OF PRESENT ILLNESS
[Gradual] : gradual [Dull/Aching] : dull/aching [Intermittent] : intermittent [Household chores] : household chores [Injection therapy] : injection therapy [Walking] : walking [7] : 7 [5] : 5 [Sharp] : sharp [Nothing helps with pain getting better] : Nothing helps with pain getting better [de-identified] : 12/12/22: Patient is an 81 yo female c/o left knee pain for 6 months with no specific injury. No n/t. Taking tylenol as needed for pain. Pain is worse with activity. Has seen an ortho in past who gave injection which gave some relief. No previous surgeries to left knee. \par \par 01/09/23 here to start left knee euflexxa  [] : no [FreeTextEntry1] : LT Knee  [FreeTextEntry5] : Hx of OA, would like to discuss injections \par  [de-identified] : dr luo  [de-identified] : csi

## 2023-01-16 NOTE — PROCEDURE
[FreeTextEntry3] : Large joint injection was performed of the left knee. \par The indication for this procedure was pain, inflammation and x-ray evidence of Osteoarthritis on this or prior visit. The site was prepped with alcohol, betadine, ethyl chloride sprayed topically and sterile technique used. \par An injection of EUFLEXXA 2ml, series # 2 was used.  \par Patient was advised to call if redness, pain or fever occur, apply ice for 15 minutes out of every hour for the next 12-24 hours as tolerated and patient was advised to rest the joint(s) for 2 days. Patient has tried OTC's including aspirin, Ibuprofen, Aleve, etc or prescription NSAIDS, and/or exercises at home and/or physical therapy without satisfactory response, patient had decreased mobility in the joint and the risks benefits, and alternatives have been discussed, and verbal consent was obtained.\par

## 2023-01-23 ENCOUNTER — APPOINTMENT (OUTPATIENT)
Dept: ORTHOPEDIC SURGERY | Facility: CLINIC | Age: 83
End: 2023-01-23
Payer: MEDICARE

## 2023-01-23 PROCEDURE — 99024 POSTOP FOLLOW-UP VISIT: CPT

## 2023-01-23 PROCEDURE — 20610 DRAIN/INJ JOINT/BURSA W/O US: CPT | Mod: LT

## 2023-01-23 NOTE — ASSESSMENT
[FreeTextEntry1] : EUFLEXXA #3 LEFT KNEE, TOLERATED WELL\par DISCUSSED TIMING OF INJECTIONS\par RECHECK IN 6 WEEKS

## 2023-01-23 NOTE — PROCEDURE
[FreeTextEntry3] : Large joint injection was performed of the left knee. \par The indication for this procedure was pain, inflammation and x-ray evidence of Osteoarthritis on this or prior visit. The site was prepped with alcohol, betadine, ethyl chloride sprayed topically and sterile technique used. \par An injection of EUFLEXXA 2ml, series # 3 was used.  \par Patient was advised to call if redness, pain or fever occur, apply ice for 15 minutes out of every hour for the next 12-24 hours as tolerated and patient was advised to rest the joint(s) for 2 days. Patient has tried OTC's including aspirin, Ibuprofen, Aleve, etc or prescription NSAIDS, and/or exercises at home and/or physical therapy without satisfactory response, patient had decreased mobility in the joint and the risks benefits, and alternatives have been discussed, and verbal consent was obtained.\par

## 2023-01-23 NOTE — HISTORY OF PRESENT ILLNESS
[6] : 6 [Localized] : localized [Injection therapy] : injection therapy [Walking] : walking [de-identified] : 01/23/23 left knee euflexxa # 3, no relief of symptoms [FreeTextEntry1] : left knee  [de-identified] : euflexxa injections

## 2023-02-01 ENCOUNTER — APPOINTMENT (OUTPATIENT)
Dept: ORTHOPEDIC SURGERY | Facility: CLINIC | Age: 83
End: 2023-02-01

## 2023-02-13 ENCOUNTER — APPOINTMENT (OUTPATIENT)
Dept: ORTHOPEDIC SURGERY | Facility: CLINIC | Age: 83
End: 2023-02-13
Payer: MEDICARE

## 2023-02-13 ENCOUNTER — NON-APPOINTMENT (OUTPATIENT)
Age: 83
End: 2023-02-13

## 2023-02-13 VITALS
BODY MASS INDEX: 22.08 KG/M2 | DIASTOLIC BLOOD PRESSURE: 76 MMHG | HEIGHT: 62 IN | SYSTOLIC BLOOD PRESSURE: 136 MMHG | HEART RATE: 84 BPM | WEIGHT: 120 LBS

## 2023-02-13 PROCEDURE — 99204 OFFICE O/P NEW MOD 45 MIN: CPT

## 2023-02-13 PROCEDURE — 72110 X-RAY EXAM L-2 SPINE 4/>VWS: CPT

## 2023-02-13 RX ORDER — NEOMYCIN AND POLYMYXIN B SULFATES AND DEXAMETHASONE 3.5; 10000; 1 MG/G; [IU]/G; MG/G
3.5-10000-0.1 OINTMENT OPHTHALMIC
Qty: 4 | Refills: 0 | Status: ACTIVE | COMMUNITY
Start: 2023-02-01

## 2023-02-13 RX ORDER — LOSARTAN POTASSIUM 50 MG/1
50 TABLET, FILM COATED ORAL
Qty: 30 | Refills: 0 | Status: ACTIVE | COMMUNITY
Start: 2023-02-06

## 2023-02-13 RX ORDER — PREDNISONE 20 MG/1
20 TABLET ORAL
Qty: 9 | Refills: 0 | Status: ACTIVE | COMMUNITY
Start: 2023-02-02

## 2023-02-14 ENCOUNTER — APPOINTMENT (OUTPATIENT)
Dept: ORTHOPEDIC SURGERY | Facility: CLINIC | Age: 83
End: 2023-02-14
Payer: MEDICARE

## 2023-02-14 DIAGNOSIS — S42.111D DISPLACED FRACTURE OF BODY OF SCAPULA, RIGHT SHOULDER, SUBSEQUENT ENCOUNTER FOR FRACTURE WITH ROUTINE HEALING: ICD-10-CM

## 2023-02-14 PROCEDURE — 99213 OFFICE O/P EST LOW 20 MIN: CPT

## 2023-02-14 PROCEDURE — 73010 X-RAY EXAM OF SHOULDER BLADE: CPT | Mod: RT

## 2023-02-14 PROCEDURE — 73030 X-RAY EXAM OF SHOULDER: CPT | Mod: RT

## 2023-02-14 NOTE — ASSESSMENT
[FreeTextEntry1] : Right scapula fracture, mild displacement, articular surface well aligned.\par Xrays reviewed, fx healed.\par She will finish PT.\par WBAT.\par HEP.\par RTO prn.\par

## 2023-02-14 NOTE — HISTORY OF PRESENT ILLNESS
[5] : 5 [Dull/Aching] : dull/aching [Constant] : constant [Meds] : meds [Bending forward] : bending forward [Physical therapy] : physical therapy [de-identified] : 2/14/23: Here for follow up. She is about 7 months from injury. She reports less pain but continues to have stiffness. \par \par 11/8/22: Here foe follow up. She is in PT.\par \par 9/29/22: Here for follow up. She has questions about driving and PT.\par \par 9/13/22: Here for follow up.  She is in PT but she is unhappy with them. \par \par 8/30/22: Here to f/u on right shoulder/scap. Pain has improved, wearing sling.\par \par 8/18/22:  This is an 82 year old female that fell at home on 7/27/22, she fell at home. She was seen at urgent care and also evaluated by Dr Weiss, underwent Xrays and instructed she fractured her scapula. Pain has slightly improved since the fall. She denies any radicular complaints. She was maintained in a sling for a week or so and then discontinued to start gentle ROM.  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

## 2023-02-14 NOTE — PHYSICAL EXAM
[Right] : right shoulder [The fracture is in acceptable alignment. There is progression in healing seen] : The fracture is in acceptable alignment. There is progression in healing seen [5 ___] : forward flexion 5[unfilled]/5 [] : no erythema [FreeTextEntry9] : FE: 130P\par ER 50P

## 2023-02-17 ENCOUNTER — INPATIENT (INPATIENT)
Facility: HOSPITAL | Age: 83
LOS: 1 days | Discharge: HOME | DRG: 312 | End: 2023-02-19
Attending: STUDENT IN AN ORGANIZED HEALTH CARE EDUCATION/TRAINING PROGRAM | Admitting: STUDENT IN AN ORGANIZED HEALTH CARE EDUCATION/TRAINING PROGRAM
Payer: MEDICARE

## 2023-02-17 VITALS
DIASTOLIC BLOOD PRESSURE: 72 MMHG | TEMPERATURE: 97 F | HEART RATE: 71 BPM | RESPIRATION RATE: 18 BRPM | WEIGHT: 119.93 LBS | SYSTOLIC BLOOD PRESSURE: 124 MMHG | HEIGHT: 61 IN | OXYGEN SATURATION: 100 %

## 2023-02-17 DIAGNOSIS — M79.669 PAIN IN UNSPECIFIED LOWER LEG: ICD-10-CM

## 2023-02-17 DIAGNOSIS — M54.9 DORSALGIA, UNSPECIFIED: ICD-10-CM

## 2023-02-17 DIAGNOSIS — R55 SYNCOPE AND COLLAPSE: ICD-10-CM

## 2023-02-17 DIAGNOSIS — Z98.89 OTHER SPECIFIED POSTPROCEDURAL STATES: Chronic | ICD-10-CM

## 2023-02-17 DIAGNOSIS — Z79.899 OTHER LONG TERM (CURRENT) DRUG THERAPY: ICD-10-CM

## 2023-02-17 DIAGNOSIS — K21.9 GASTRO-ESOPHAGEAL REFLUX DISEASE WITHOUT ESOPHAGITIS: ICD-10-CM

## 2023-02-17 DIAGNOSIS — Z98.82 BREAST IMPLANT STATUS: Chronic | ICD-10-CM

## 2023-02-17 DIAGNOSIS — E78.5 HYPERLIPIDEMIA, UNSPECIFIED: ICD-10-CM

## 2023-02-17 DIAGNOSIS — Z29.9 ENCOUNTER FOR PROPHYLACTIC MEASURES, UNSPECIFIED: ICD-10-CM

## 2023-02-17 DIAGNOSIS — K52.9 NONINFECTIVE GASTROENTERITIS AND COLITIS, UNSPECIFIED: ICD-10-CM

## 2023-02-17 DIAGNOSIS — Z98.890 OTHER SPECIFIED POSTPROCEDURAL STATES: Chronic | ICD-10-CM

## 2023-02-17 DIAGNOSIS — F41.9 ANXIETY DISORDER, UNSPECIFIED: ICD-10-CM

## 2023-02-17 DIAGNOSIS — Z90.710 ACQUIRED ABSENCE OF BOTH CERVIX AND UTERUS: Chronic | ICD-10-CM

## 2023-02-17 DIAGNOSIS — I10 ESSENTIAL (PRIMARY) HYPERTENSION: ICD-10-CM

## 2023-02-17 DIAGNOSIS — R93.89 ABNORMAL FINDINGS ON DIAGNOSTIC IMAGING OF OTHER SPECIFIED BODY STRUCTURES: ICD-10-CM

## 2023-02-17 DIAGNOSIS — R74.01 ELEVATION OF LEVELS OF LIVER TRANSAMINASE LEVELS: ICD-10-CM

## 2023-02-17 DIAGNOSIS — Z90.49 ACQUIRED ABSENCE OF OTHER SPECIFIED PARTS OF DIGESTIVE TRACT: Chronic | ICD-10-CM

## 2023-02-17 DIAGNOSIS — I25.10 ATHEROSCLEROTIC HEART DISEASE OF NATIVE CORONARY ARTERY WITHOUT ANGINA PECTORIS: ICD-10-CM

## 2023-02-17 LAB
ALBUMIN SERPL ELPH-MCNC: 4.1 G/DL — SIGNIFICANT CHANGE UP (ref 3.3–5)
ALP SERPL-CCNC: 71 U/L — SIGNIFICANT CHANGE UP (ref 40–120)
ALT FLD-CCNC: 40 U/L — SIGNIFICANT CHANGE UP (ref 12–78)
ANION GAP SERPL CALC-SCNC: 9 MMOL/L — SIGNIFICANT CHANGE UP (ref 5–17)
AST SERPL-CCNC: 45 U/L — HIGH (ref 15–37)
BASOPHILS # BLD AUTO: 0.03 K/UL — SIGNIFICANT CHANGE UP (ref 0–0.2)
BASOPHILS NFR BLD AUTO: 0.2 % — SIGNIFICANT CHANGE UP (ref 0–2)
BILIRUB SERPL-MCNC: 0.4 MG/DL — SIGNIFICANT CHANGE UP (ref 0.2–1.2)
BUN SERPL-MCNC: 27 MG/DL — HIGH (ref 7–23)
CALCIUM SERPL-MCNC: 9.2 MG/DL — SIGNIFICANT CHANGE UP (ref 8.5–10.1)
CHLORIDE SERPL-SCNC: 108 MMOL/L — SIGNIFICANT CHANGE UP (ref 96–108)
CO2 SERPL-SCNC: 22 MMOL/L — SIGNIFICANT CHANGE UP (ref 22–31)
CREAT SERPL-MCNC: 0.86 MG/DL — SIGNIFICANT CHANGE UP (ref 0.5–1.3)
EGFR: 67 ML/MIN/1.73M2 — SIGNIFICANT CHANGE UP
EOSINOPHIL # BLD AUTO: 0.25 K/UL — SIGNIFICANT CHANGE UP (ref 0–0.5)
EOSINOPHIL NFR BLD AUTO: 1.4 % — SIGNIFICANT CHANGE UP (ref 0–6)
FLUAV AG NPH QL: SIGNIFICANT CHANGE UP
FLUBV AG NPH QL: SIGNIFICANT CHANGE UP
GLUCOSE SERPL-MCNC: 97 MG/DL — SIGNIFICANT CHANGE UP (ref 70–99)
HCT VFR BLD CALC: 43.1 % — SIGNIFICANT CHANGE UP (ref 34.5–45)
HGB BLD-MCNC: 13.3 G/DL — SIGNIFICANT CHANGE UP (ref 11.5–15.5)
IMM GRANULOCYTES NFR BLD AUTO: 0.5 % — SIGNIFICANT CHANGE UP (ref 0–0.9)
LIDOCAIN IGE QN: 152 U/L — SIGNIFICANT CHANGE UP (ref 73–393)
LYMPHOCYTES # BLD AUTO: 1.87 K/UL — SIGNIFICANT CHANGE UP (ref 1–3.3)
LYMPHOCYTES # BLD AUTO: 10.7 % — LOW (ref 13–44)
MAGNESIUM SERPL-MCNC: 2.3 MG/DL — SIGNIFICANT CHANGE UP (ref 1.6–2.6)
MCHC RBC-ENTMCNC: 26.9 PG — LOW (ref 27–34)
MCHC RBC-ENTMCNC: 30.9 GM/DL — LOW (ref 32–36)
MCV RBC AUTO: 87.2 FL — SIGNIFICANT CHANGE UP (ref 80–100)
MONOCYTES # BLD AUTO: 1.05 K/UL — HIGH (ref 0–0.9)
MONOCYTES NFR BLD AUTO: 6 % — SIGNIFICANT CHANGE UP (ref 2–14)
NEUTROPHILS # BLD AUTO: 14.12 K/UL — HIGH (ref 1.8–7.4)
NEUTROPHILS NFR BLD AUTO: 81.2 % — HIGH (ref 43–77)
NRBC # BLD: 0 /100 WBCS — SIGNIFICANT CHANGE UP (ref 0–0)
PLATELET # BLD AUTO: 324 K/UL — SIGNIFICANT CHANGE UP (ref 150–400)
POTASSIUM SERPL-MCNC: 3.9 MMOL/L — SIGNIFICANT CHANGE UP (ref 3.5–5.3)
POTASSIUM SERPL-SCNC: 3.9 MMOL/L — SIGNIFICANT CHANGE UP (ref 3.5–5.3)
PROT SERPL-MCNC: 8 G/DL — SIGNIFICANT CHANGE UP (ref 6–8.3)
RBC # BLD: 4.94 M/UL — SIGNIFICANT CHANGE UP (ref 3.8–5.2)
RBC # FLD: 14.5 % — SIGNIFICANT CHANGE UP (ref 10.3–14.5)
RSV RNA NPH QL NAA+NON-PROBE: SIGNIFICANT CHANGE UP
SARS-COV-2 RNA SPEC QL NAA+PROBE: SIGNIFICANT CHANGE UP
SODIUM SERPL-SCNC: 139 MMOL/L — SIGNIFICANT CHANGE UP (ref 135–145)
TROPONIN I, HIGH SENSITIVITY RESULT: 5.7 NG/L — SIGNIFICANT CHANGE UP
WBC # BLD: 17.41 K/UL — HIGH (ref 3.8–10.5)
WBC # FLD AUTO: 17.41 K/UL — HIGH (ref 3.8–10.5)

## 2023-02-17 PROCEDURE — 70450 CT HEAD/BRAIN W/O DYE: CPT | Mod: 26,MA

## 2023-02-17 PROCEDURE — 93010 ELECTROCARDIOGRAM REPORT: CPT

## 2023-02-17 PROCEDURE — 99285 EMERGENCY DEPT VISIT HI MDM: CPT

## 2023-02-17 PROCEDURE — 74176 CT ABD & PELVIS W/O CONTRAST: CPT | Mod: 26,MA

## 2023-02-17 PROCEDURE — 99223 1ST HOSP IP/OBS HIGH 75: CPT | Mod: GC

## 2023-02-17 PROCEDURE — 71045 X-RAY EXAM CHEST 1 VIEW: CPT | Mod: 26

## 2023-02-17 RX ORDER — CIPROFLOXACIN LACTATE 400MG/40ML
400 VIAL (ML) INTRAVENOUS ONCE
Refills: 0 | Status: COMPLETED | OUTPATIENT
Start: 2023-02-17 | End: 2023-02-17

## 2023-02-17 RX ORDER — CIPROFLOXACIN LACTATE 400MG/40ML
400 VIAL (ML) INTRAVENOUS EVERY 12 HOURS
Refills: 0 | Status: DISCONTINUED | OUTPATIENT
Start: 2023-02-17 | End: 2023-02-18

## 2023-02-17 RX ORDER — SODIUM CHLORIDE 9 MG/ML
1000 INJECTION INTRAMUSCULAR; INTRAVENOUS; SUBCUTANEOUS ONCE
Refills: 0 | Status: COMPLETED | OUTPATIENT
Start: 2023-02-17 | End: 2023-02-17

## 2023-02-17 RX ORDER — ONDANSETRON 8 MG/1
4 TABLET, FILM COATED ORAL EVERY 8 HOURS
Refills: 0 | Status: DISCONTINUED | OUTPATIENT
Start: 2023-02-17 | End: 2023-02-19

## 2023-02-17 RX ORDER — FAMOTIDINE 10 MG/ML
20 INJECTION INTRAVENOUS DAILY
Refills: 0 | Status: DISCONTINUED | OUTPATIENT
Start: 2023-02-17 | End: 2023-02-18

## 2023-02-17 RX ORDER — ASPIRIN/CALCIUM CARB/MAGNESIUM 324 MG
81 TABLET ORAL DAILY
Refills: 0 | Status: DISCONTINUED | OUTPATIENT
Start: 2023-02-17 | End: 2023-02-19

## 2023-02-17 RX ORDER — METRONIDAZOLE 500 MG
500 TABLET ORAL EVERY 8 HOURS
Refills: 0 | Status: DISCONTINUED | OUTPATIENT
Start: 2023-02-17 | End: 2023-02-19

## 2023-02-17 RX ORDER — LANOLIN ALCOHOL/MO/W.PET/CERES
3 CREAM (GRAM) TOPICAL AT BEDTIME
Refills: 0 | Status: DISCONTINUED | OUTPATIENT
Start: 2023-02-17 | End: 2023-02-19

## 2023-02-17 RX ORDER — ENOXAPARIN SODIUM 100 MG/ML
40 INJECTION SUBCUTANEOUS EVERY 24 HOURS
Refills: 0 | Status: DISCONTINUED | OUTPATIENT
Start: 2023-02-17 | End: 2023-02-19

## 2023-02-17 RX ORDER — ONDANSETRON 8 MG/1
4 TABLET, FILM COATED ORAL ONCE
Refills: 0 | Status: COMPLETED | OUTPATIENT
Start: 2023-02-17 | End: 2023-02-17

## 2023-02-17 RX ORDER — METRONIDAZOLE 500 MG
500 TABLET ORAL ONCE
Refills: 0 | Status: COMPLETED | OUTPATIENT
Start: 2023-02-17 | End: 2023-02-17

## 2023-02-17 RX ORDER — ACETAMINOPHEN 500 MG
650 TABLET ORAL EVERY 6 HOURS
Refills: 0 | Status: DISCONTINUED | OUTPATIENT
Start: 2023-02-17 | End: 2023-02-19

## 2023-02-17 RX ADMIN — ONDANSETRON 4 MILLIGRAM(S): 8 TABLET, FILM COATED ORAL at 19:50

## 2023-02-17 RX ADMIN — SODIUM CHLORIDE 1000 MILLILITER(S): 9 INJECTION INTRAMUSCULAR; INTRAVENOUS; SUBCUTANEOUS at 19:50

## 2023-02-17 RX ADMIN — Medication 200 MILLIGRAM(S): at 21:23

## 2023-02-17 RX ADMIN — Medication 100 MILLIGRAM(S): at 20:47

## 2023-02-17 NOTE — H&P ADULT - NSHPSOCIALHISTORY_GEN_ALL_CORE
Living Situation: lives alone with 4 cats  ADLs/Mobility: independently but has some difficulty with L leg 2/2 to fall  Tobacco Use: denies  Alcohol Use: denies  Drug Use: denies

## 2023-02-17 NOTE — H&P ADULT - NSICDXFAMILYHX_GEN_ALL_CORE_FT
FAMILY HISTORY:  Father  Still living? Unknown  Family history of Alzheimer's disease, Age at diagnosis: Age Unknown  Family history of skin cancer, Age at diagnosis: Age Unknown    Mother  Still living? Unknown  Family history of Alzheimer's disease, Age at diagnosis: Age Unknown  Family history of diabetes mellitus in mother, Age at diagnosis: Age Unknown  Family history of skin cancer, Age at diagnosis: Age Unknown

## 2023-02-17 NOTE — H&P ADULT - PROBLEM SELECTOR PLAN 1
Syncope likely possible vasovagal vs. orthostatic hypotension vs. arrhythmia  - Tele monitoring to r/o arrhythmia  - Check orthostatics  - Fall and aspiration precautions  - F/u TTE  - Check TSH, A1c, lipid profile, monitor electrolytes  - Cardio Dr. Forest Moreno consulted, f/u recs

## 2023-02-17 NOTE — ED PROVIDER NOTE - FAMILY DETAILS FREE TEXT FOR MDM ADDL HISTORY OBTAINED FROM QUESTION
additional history and meds was obtained during phone consultation with pt's son Ruben upon his arrival

## 2023-02-17 NOTE — H&P ADULT - NSICDXPASTMEDICALHX_GEN_ALL_CORE_FT
PAST MEDICAL HISTORY:  Anxiety     CAD (coronary artery disease)     Chronic back pain     HLD (hyperlipidemia)     HTN (hypertension)

## 2023-02-17 NOTE — H&P ADULT - PROBLEM SELECTOR PLAN 6
- Primary team to complete medication reconciliation  - Patient knows some of her medications but unsure of dosages  - Admitting team unable to complete med rec since patient's pharmacy was closed at time of admission  - Brandon Fernández in [place] ([phone])

## 2023-02-17 NOTE — ED PROVIDER NOTE - CLINICAL SUMMARY MEDICAL DECISION MAKING FREE TEXT BOX
Patient is an 82-year-old female with a history of coronary artery disease with 1 stent.  Status post recent diagnostic catheterization 1 week ago at [facility].  History hypertension, HLD.  Status post appendectomy, spine surgery, cosmetic surgery.  Was in usual state of health until this afternoon while playing a board game with her friend she felt suddenly nauseous and dizzy and fainted after expressing discomfort and pain in her abdomen.  She had a brief syncopal episode where upon awakening her friends called 911 and she was brought in by ALS ambulance for evaluation.  Patient denies any recent illness or injury.  She denies any chest pain or shortness of breath.  She denies any urinary symptoms.  She does endorse being constipated.  Physical exam revealed a distended abdomen.  NIHSS stroke score 0.  No other focal findings on exam.    Differential includes abdominal distention perforated viscus or infection.  Abdominal obstruction, obstipation or constipation.  Rule out urinary retention or other urologic complaint.  In addition patient may have pancreatitis.  Acute cholecystitis.  But the exam is inconsistent with that we will start with a CAT scan.  Patient passed out has a history of coronary disease and recent angiogram CAT scan will also be used to evaluate for retroperitoneal hematoma status post cath.  EKG, cardiac monitoring, troponins for the syncope.  Will consult [facility] [place] cardiology for evaluation in the ER.  CAT scan, laboratory studies urinalysis IV hydration cardiac monitor, disposition according to results and consults.  Dragon end of dictation

## 2023-02-17 NOTE — H&P ADULT - ASSESSMENT
81yo F with PMH s/p angiography at [facility] 1 week ago, CAD s/p 1 stent, chronic back pain 2/2 to fall, anxiety, HTN, HLD, presents to [facility] ED with syncope. Found to have inflammation of small bowel on CT. Admitted for syncope workup and enteritis.  83yo F with PMH s/p angiography at [facility] 1 week ago, CAD s/p 1 stent, chronic back pain 2/2 to fall, anxiety, HTN, HLD, anxiety presents to [facility] ED with syncope. Found to have inflammation of small bowel on CT. Admitted for syncope workup and enteritis.

## 2023-02-17 NOTE — ED ADULT NURSE REASSESSMENT NOTE - NS ED NURSE REASSESS COMMENT FT1
received report from CONCETTA Navarrete.  pt awake alert able to make all needs known.  pt transported to CT scan and returned without incident.  IVF initiated and medicated with zofran for nausea.  awaiting results pending admission.  pt oriented to unit, bed locked and in lowest position, call bell within reach.  spoke with pts leatha Arroyo and updated plan of care.  all questions answered in great detail.

## 2023-02-17 NOTE — ED ADULT NURSE NOTE - NSIMPLEMENTINTERV_GEN_ALL_ED
Implemented All Fall Risk Interventions:  [place] to call system. Call bell, personal items and telephone within reach. Instruct patient to call for assistance. Room bathroom lighting operational. Non-slip footwear when patient is off stretcher. Physically safe environment: no spills, clutter or unnecessary equipment. Stretcher in lowest position, wheels locked, appropriate side rails in place. Provide visual cue, wrist band, yellow gown, etc. Monitor gait and stability. Monitor for mental status changes and reorient to person, place, and time. Review medications for side effects contributing to fall risk. Reinforce activity limits and safety measures with patient and family.

## 2023-02-17 NOTE — ED PROVIDER NOTE - PROGRESS NOTE DETAILS
cardiology dr Jeter paged for consult cardiology dr Lopez paged for consult dw dr lopez he is being covered by the DR. Moreno group and will pass along the consult recc admit

## 2023-02-17 NOTE — H&P ADULT - NSICDXPASTSURGICALHX_GEN_ALL_CORE_FT
PAST SURGICAL HISTORY:  H/O sinus surgery     History of back surgery     History of cosmetic surgery     S/P appendectomy     S/P hysterectomy

## 2023-02-17 NOTE — H&P ADULT - NSHPPHYSICALEXAM_GEN_ALL_CORE
T(C): 36.1 (02-17-23 @ 17:32), Max: 36.1 (02-17-23 @ 17:32)  HR: 75 (02-17-23 @ 20:14) (71 - 75)  BP: 121/69 (02-17-23 @ 20:14) (121/69 - 124/72)  RR: 16 (02-17-23 @ 20:14) (16 - 18)  SpO2: 100% (02-17-23 @ 20:14) (100% - 100%)  Wt(kg): --    Physical Exam:   GENERAL: well-groomed, NAD, pleasant  HEENT: head NC/AT; EOM intact, PERRL, conjunctiva & sclera clear; hearing grossly intact, moist mucous membranes  NECK: supple, no JVD  RESPIRATORY: CTA B/L, no wheezing, rales, rhonchi or rubs  CARDIOVASCULAR: S1&S2, RRR, +murmur  ABDOMEN: soft, TTP RUQ, umbilical area, mildly distended, + Bowel sounds x4 quadrants, no guarding, rebound or rigidity  MUSCULOSKELETAL:  no clubbing, cyanosis of all 4 extremities. Trace edema of b/l LE L>R. + b/l calf tenderness  LYMPH: no cervical lymphadenopathy  VASCULAR: Radial pulses 2+ bilaterally, no varicose veins   SKIN: warm and dry, color normal  NEUROLOGIC: AA&O X3, CN2-12 intact w/ no focal deficits, no sensory loss, motor Strength 5/5 in UE. 5/5 RLE, 3/5 in LLE  Psych: Normal mood and affect, normal behavior

## 2023-02-17 NOTE — H&P ADULT - PROBLEM SELECTOR PLAN 8
Chronic, history of stents x1   - Continue home medications: ASA 81  - Primary team to verify medications

## 2023-02-17 NOTE — H&P ADULT - ATTENDING COMMENTS
81 y/o F presented with abd pain and syncope.      General: Awake and alert, cooperative with exam. No acute distress.   Skin: Warm, dry, and pink.   Eyes: Pupils equal and reactive to light. Extraocular eye movements intact. No conjunctival injection, discharge, or scleral icterus.   HEENT: Atraumatic, normocephalic. Moist mucus membranes.  Cardiology: Normal S1, S2. Systolic ejection murmur. Regular rate and rhythm.   Respiratory: Lungs clear to ascultation bilaterally. Good air exchange. No wheezes, rales, or rhonchi. Normal chest expansion.   Gastrointestinal: Positive bowel sounds. Soft, non-tender, non-distended. No guarding, rigidity, or rebound tenderness. No hepatosplenomegaly.   Musculoskeletal: 5/5 motor strength in all extremities. Normal range of motion.   Extremities: No peripheral edema bilaterally. Dorsalis pedis pulses 2+ bilaterally.   Neurological: A+Ox3 (person, place, and time). Cranial nerves 2-12 intact. Normal speech. No facial droop. No focal neurological deficits.   Psychiatric: Normal affect. Normal mood.     ER course: VSS. Labs: WBC 17.41, neutrophils 81.2%, AST 45.  EKG: NSR with HR 67 bpm, no ST segment changes, T wave inversions in AVL (personally reviewed).      Imaging:    - CT abd/pelvis: A 1.2 cm left adrenal adenoma. Questionable enteritis.   - CXR: no consolidation, no effusion, no pneumothorax (personally reviewed).    - CT head: No acute intracranial hemorrhage mass effect, edema or midline shift.     Pt was given Ciprofloxacin and Flagyl, 1L of NS, zofran. She is being admitted to telemetry for further management.      Assessment:    - Syncope    - Leukocytosis likely secondary to enteritis    - Elevated AST    - 1.2 cm left adrenal adenoma      Plan:    - Monitor on telemetry    - f/u orthostatics, ECHO    - IVF at 75 ml/hr overnight    - Clear liquid diet, advance as tolerated    - c/w Ciprofloxacin and Metronidazole    - Trend LFTs    - Cardiology consult – Dr. Forest Hester    - DVT ppx: Lovenox 40 mg subcutaneous daily    - Code status: Full code 83 y/o F presented with abd pain and syncope.      General: Awake and alert, cooperative with exam. No acute distress.   Skin: Warm, dry, and pink.   Eyes: Pupils equal and reactive to light. Extraocular eye movements intact. No conjunctival injection, discharge, or scleral icterus.   HEENT: Atraumatic, normocephalic. Moist mucus membranes.  Cardiology: Normal S1, S2. Systolic ejection murmur. Regular rate and rhythm.   Respiratory: Lungs clear to ascultation bilaterally. Good air exchange. No wheezes, rales, or rhonchi. Normal chest expansion.   Gastrointestinal: Positive bowel sounds. Soft, non-tender, non-distended. No guarding, rigidity, or rebound tenderness. No hepatosplenomegaly.   Musculoskeletal: 5/5 motor strength in all extremities. Normal range of motion.   Extremities: No peripheral edema bilaterally. Dorsalis pedis pulses 2+ bilaterally.   Neurological: A+Ox3 (person, place, and time). Cranial nerves 2-12 intact. Normal speech. No facial droop. No focal neurological deficits.   Psychiatric: Normal affect. Normal mood.     ER course: VSS. Labs: WBC 17.41, neutrophils 81.2%, AST 45.  EKG: NSR with HR 67 bpm, no ST segment changes, T wave inversions in AVL (personally reviewed).      Imaging:    - CT abd/pelvis: A 1.2 cm left adrenal adenoma. Questionable enteritis.   - CXR: no consolidation, no effusion, no pneumothorax (personally reviewed).    - CT head: No acute intracranial hemorrhage mass effect, edema or midline shift.     Pt was given Ciprofloxacin and Flagyl, 1L of NS, zofran. She is being admitted to telemetry for further management.      Assessment:    - Syncope    - Leukocytosis likely secondary to enteritis    - Elevated AST    - 1.2 cm left adrenal adenoma      Plan:    - Monitor on telemetry    - f/u orthostatics, ECHO    - IVF at 75 ml/hr overnight    - Clear liquid diet, advance as tolerated    - c/w Ciprofloxacin and Metronidazole    - Trend LFTs    - f/u with PCP/endocrinology regarding left adrenal adenoma   - Cardiology consult – Dr. Forest Hester    - DVT ppx: Lovenox 40 mg subcutaneous daily    - Code status: Full code 81 y/o F presented with abd pain and syncope.      General: Awake and alert, cooperative with exam. No acute distress.   Skin: Warm, dry, and pink.   Eyes: Pupils equal and reactive to light. Extraocular eye movements intact. No conjunctival injection, discharge, or scleral icterus.   HEENT: Atraumatic, normocephalic. Moist mucus membranes.  Cardiology: Normal S1, S2. Systolic ejection murmur. Regular rate and rhythm.   Respiratory: Lungs clear to ascultation bilaterally. Good air exchange. No wheezes, rales, or rhonchi. Normal chest expansion.   Gastrointestinal: Positive bowel sounds. Soft, non-tender, non-distended. No guarding, rigidity, or rebound tenderness. No hepatosplenomegaly.   Musculoskeletal: 5/5 motor strength in all extremities. Normal range of motion.   Extremities: No peripheral edema bilaterally. Dorsalis pedis pulses 2+ bilaterally.   Neurological: A+Ox3 (person, place, and time). Cranial nerves 2-12 intact. Normal speech. No facial droop. No focal neurological deficits.   Psychiatric: Normal affect. Normal mood.     ER course: VSS. Labs: WBC 17.41, neutrophils 81.2%, AST 45.  EKG: NSR with HR 67 bpm, no ST segment changes, T wave inversions in AVL (personally reviewed).      Imaging:    - CT abd/pelvis: A 1.2 cm left adrenal adenoma. Questionable enteritis.   - CXR: no consolidation, no effusion, no pneumothorax (personally reviewed).    - CT head: No acute intracranial hemorrhage mass effect, edema or midline shift.     Pt was given Ciprofloxacin and Flagyl, 1L of NS, zofran. She is being admitted to telemetry for further management.      Assessment:    - Syncope    - Leukocytosis likely secondary to enteritis    - Elevated AST    - 1.2 cm left adrenal adenoma      Plan:    - Monitor on telemetry    - f/u orthostatics, ECHO    - IVF at 75 ml/hr overnight    - Clear liquid diet, advance as tolerated    - c/w Ciprofloxacin and Metronidazole    - Trend LFTs    - f/u with PCP/endocrinology regarding left adrenal adenoma   - Cardiology consult – Dr. Forest Hester    - DVT ppx: Lovenox 40 mg subcutaneous daily    - Code status: Full code    I spent a total of 80 minutes on the date of this encounter coordinating the patient's care. This includes reviewing prior documentation, results and imaging in addition to completing a full history and physical examination on the patient. Further tests, medications, and procedures have been ordered as indicated. Laboratory results and the plan of care were communicated to the patient and/or their family member. Supporting documentation was completed and added to the patient's chart. 83 y/o F presented with abd pain and syncope.      General: Awake and alert, cooperative with exam. No acute distress.   Skin: Warm, dry, and pink.   Eyes: Pupils equal and reactive to light. Extraocular eye movements intact. No conjunctival injection, discharge, or scleral icterus.   HEENT: Atraumatic, normocephalic. Moist mucus membranes.  Cardiology: Normal S1, S2. Systolic ejection murmur. Regular rate and rhythm.   Respiratory: Lungs clear to ascultation bilaterally. Good air exchange. No wheezes, rales, or rhonchi. Normal chest expansion.   Gastrointestinal: Positive bowel sounds. Soft, non-tender, non-distended. No guarding, rigidity, or rebound tenderness. No hepatosplenomegaly.   Musculoskeletal: 5/5 motor strength in all extremities. Normal range of motion.   Extremities: No peripheral edema bilaterally. Dorsalis pedis pulses 2+ bilaterally.   Neurological: A+Ox3 (person, place, and time). Cranial nerves 2-12 intact. Normal speech. No facial droop. No focal neurological deficits.   Psychiatric: Normal affect. Normal mood.     ER course: VSS. Labs: WBC 17.41, neutrophils 81.2%, AST 45.  EKG: NSR with HR 67 bpm, no ST segment changes, T wave inversions in AVL (personally reviewed).      Imaging:    - CT abd/pelvis: A 1.2 cm left adrenal adenoma. Questionable enteritis.   - CXR: no consolidation, no effusion, no pneumothorax (personally reviewed).    - CT head: No acute intracranial hemorrhage mass effect, edema or midline shift.     Pt was given Ciprofloxacin and Flagyl, 1L of NS, zofran. She is being admitted to telemetry for further management.      Assessment:    - Syncope  (vasovagal vs. orthostatic vs. arrhythmia vs. valvular)   - Leukocytosis likely secondary to enteritis    - Elevated AST    - 1.2 cm left adrenal adenoma    - History of  angiography at [facility] 1 week ago, CAD s/p 1 stent, chronic back pain 2/2 to fall, anxiety, HTN, HLD    Plan:    - Monitor on telemetry    - f/u orthostatics, ECHO    - IVF at 75 ml/hr overnight    - Clear liquid diet, advance as tolerated    - c/w Ciprofloxacin and Metronidazole    - Trend LFTs    - f/u with PCP/endocrinology regarding left adrenal adenoma   - Cardiology consult – Dr. Forest Kacie    - DVT ppx: Lovenox 40 mg subcutaneous daily    - Code status: Full code    I spent a total of 80 minutes on the date of this encounter coordinating the patient's care. This includes reviewing prior documentation, results and imaging in addition to completing a full history and physical examination on the patient. Further tests, medications, and procedures have been ordered as indicated. Laboratory results and the plan of care were communicated to the patient and/or their family member. Supporting documentation was completed and added to the patient's chart. 81 y/o F presented with abd pain and syncope.      General: Awake and alert, cooperative with exam. No acute distress.   Skin: Warm, dry, and pink.   Eyes: Pupils equal and reactive to light. Extraocular eye movements intact. No conjunctival injection, discharge, or scleral icterus.   HEENT: Atraumatic, normocephalic. Moist mucus membranes.  Cardiology: Normal S1, S2. Systolic ejection murmur. Regular rate and rhythm.   Respiratory: Lungs clear to ascultation bilaterally. Good air exchange. No wheezes, rales, or rhonchi. Normal chest expansion.   Gastrointestinal: Positive bowel sounds. Soft, non-tender, non-distended. No guarding, rigidity, or rebound tenderness. No hepatosplenomegaly.   Musculoskeletal: 5/5 motor strength in all extremities. Normal range of motion.   Extremities: No peripheral edema bilaterally. Dorsalis pedis pulses 2+ bilaterally.   Neurological: A+Ox3 (person, place, and time). Cranial nerves 2-12 intact. Normal speech. No facial droop. No focal neurological deficits.   Psychiatric: Normal affect. Normal mood.     ER course: VSS. Labs: WBC 17.41, neutrophils 81.2%, AST 45.  EKG: NSR with HR 67 bpm, no ST segment changes, T wave inversions in AVL (personally reviewed).      Imaging:    - CT abd/pelvis: A 1.2 cm left adrenal adenoma. Questionable enteritis.   - CXR: no consolidation, no effusion, no pneumothorax (personally reviewed).    - CT head: No acute intracranial hemorrhage mass effect, edema or midline shift.     Pt was given Ciprofloxacin and Flagyl, 1L of NS, zofran. She is being admitted to telemetry for further management.      Assessment:    - Syncope  (vasovagal vs. orthostatic vs. arrhythmia vs. valvular)   - Leukocytosis likely secondary to enteritis    - Elevated AST    - 1.2 cm left adrenal adenoma    - History of  angiography at [facility] 1 week ago, CAD s/p 1 stent, chronic back pain 2/2 to fall, anxiety, HTN, HLD    Plan:    - Monitor on telemetry    - f/u orthostatics, ECHO    - IVF at 75 ml/hr overnight    - Clear liquid diet, advance as tolerated    - c/w Ciprofloxacin and Metronidazole    - Trend LFTs    - f/u with PCP/endocrinology regarding left adrenal adenoma   - Cardiology consult – Dr. Forest Kacie    - Please call pharmacy in the morning to verify medications   - DVT ppx: Lovenox 40 mg subcutaneous daily    - Code status: Full code    I spent a total of 80 minutes on the date of this encounter coordinating the patient's care. This includes reviewing prior documentation, results and imaging in addition to completing a full history and physical examination on the patient. Further tests, medications, and procedures have been ordered as indicated. Laboratory results and the plan of care were communicated to the patient and/or their family member. Supporting documentation was completed and added to the patient's chart.

## 2023-02-17 NOTE — H&P ADULT - PROBLEM SELECTOR PLAN 12
Chronic  - Per patient, takes lexapro at home  - Primary team to contact pharmacy to verify dose      #Need for prophylactic measure  - Lovenox for DVT ppx    GOC: Full code.

## 2023-02-17 NOTE — ED PROVIDER NOTE - OBJECTIVE STATEMENT
Patient is an 82-year-old female who has a history of coronary artery disease status post 1 stent, no MI.  Underwent routine angiography 1 week ago at [facility].  Her cardiologist is Dr. Tony Kemp.  She also has a history of chronic pain, takes tramadol and diazepam.  Denies a pill and Lexapro.  For anxiety.  History of hypertension hyperlipidemia.  Her only surgical history she endorses surgical appendectomy, she has had cosmetic surgery of her face and breast.  She denies ever smoking or drinking.  This history is supplemented by her son Ruben [phone] (cell) who provided additional history.  Patient's primary care physician is Dr. Soto.  She presents the emergency room by ambulance this afternoon for evaluation post syncope.  She was in her usual state of health until today when she began to feel abdominal discomfort, felt nauseous lightheaded and dizzy and then fainted.  The fainting was brief.  Without seizure activity.  There is no striking of her head.  She woke and her friends called 911.  She was with her friends playing a board game at the time and they witnessed this event.  Brought in by EMS for evaluation.  Patient complains of abdominal discomfort and distention.  There is no trauma or travel.  No recent injury.  No diarrhea.  But she does endorse constipation.  She denies any symptoms of urinary retention or urinary infection.

## 2023-02-17 NOTE — H&P ADULT - PROBLEM SELECTOR PLAN 5
Incidental findings on CT  - CT A/P: 1.2 cm left adrenal adenoma. Small right renal cyst.  - Patient to follow up outpatient with PCP regarding further workup

## 2023-02-17 NOTE — H&P ADULT - PROBLEM SELECTOR PLAN 2
Patient with abdominal discomfort, heartburn   - CT:   - Received in ED Cipro x1, Flagyl x1, NS 1L bolus x1, Zofran x1  - CLD for now as patient wishes to eat, advance as tolerated  - C/w Cipro and Flagyl Patient with abdominal discomfort and leukocytosis likely 2/2 enteritis   - CT A/P NC: Questionable enteritis. No bowel obstruction. S/p appendectomy. Mild inflammation surrounding small bowel.  - Received in ED Cipro x1, Flagyl x1, NS 1L bolus x1, Zofran x1  - CLD for now as patient wishes to eat, advance as tolerated  - C/w Cipro and Flagyl

## 2023-02-17 NOTE — H&P ADULT - NSHPREVIEWOFSYSTEMS_GEN_ALL_CORE
CONSTITUTIONAL: denies fever, chills, fatigue, weakness  HEENT: denies blurred vision, sore throat  SKIN: denies new lesions, rash  CARDIOVASCULAR: denies chest pain, chest pressure, palpitations  RESPIRATORY: denies shortness of breath, sputum production  GASTROINTESTINAL: +abdominal discomfort,  +chronic constipation, +nausea, Denies vomiting, diarrhea.  GENITOURINARY: +dysuria, Denies discharge  NEUROLOGICAL: denies numbness, headache, focal weakness  MUSCULOSKELETAL: denies new joint pain, muscle aches. +chronic back pain 2/2 previous fall  HEMATOLOGIC: denies gross bleeding, bruising  LYMPHATICS: denies enlarged lymph nodes, extremity swelling  PSYCHIATRIC: denies recent changes in anxiety, depression  ENDOCRINOLOGIC: denies sweating, cold or heat intolerance CONSTITUTIONAL: denies fever, chills, fatigue, weakness  HEENT: denies blurred vision, sore throat  SKIN: denies new lesions, rash  CARDIOVASCULAR: denies chest pain, chest pressure, palpitations  RESPIRATORY: denies shortness of breath, sputum production  GASTROINTESTINAL: +abdominal discomfort,  +chronic constipation, +nausea, Denies vomiting, diarrhea.  GENITOURINARY: +dysuria, + burning pain with urination. Denies discharge  NEUROLOGICAL: denies numbness, headache, focal weakness  MUSCULOSKELETAL: denies new joint pain, muscle aches. +chronic back pain 2/2 previous fall  HEMATOLOGIC: denies gross bleeding, bruising  LYMPHATICS: denies enlarged lymph nodes, extremity swelling  PSYCHIATRIC: denies recent changes in anxiety, depression  ENDOCRINOLOGIC: denies sweating, cold or heat intolerance

## 2023-02-17 NOTE — ED PROVIDER NOTE - MUSCULOSKELETAL, MLM
Spine appears normal, range of motion is not limited, no muscle or joint tenderness pt has arthritic joints of hands has well healed low mid lumbar spine scar. normal...

## 2023-02-17 NOTE — ED ADULT NURSE NOTE - OBJECTIVE STATEMENT
Pt had a syncopal episode, denies CP, dizziness or HA.  AO&X4 in NAD.  Monitoring ongoing. Pt had a syncopal episode, denies CP, dizziness or HA currently but felt dizzy when she passed out with abdominal pain.  AO&X4 in NAD.  Monitoring ongoing.

## 2023-02-17 NOTE — ED ADULT TRIAGE NOTE - CHIEF COMPLAINT QUOTE
Patient A/Ox4 with clear speech. BIBA from home for syncopal episode. Had nausea and ABD pain prior to. Did not hit head, was seated in a chair. Takes ASA 81mg PO. Talking on phone in clear sentences in triage.

## 2023-02-17 NOTE — PATIENT PROFILE ADULT - FALL HARM RISK - HARM RISK INTERVENTIONS

## 2023-02-17 NOTE — H&P ADULT - PROBLEM SELECTOR PLAN 9
Chronic, 2/2 to fall 4 months ago, follows with specialist  - Patient takes tylenol at home  - Continue tylenol PRN

## 2023-02-17 NOTE — H&P ADULT - PROBLEM SELECTOR PLAN 10
Chronic, 124/72 stable on admission  - Patient reports that she is on losartan at home, unsure of dose  - Hold off on BP meds for now  - Primary team to verify medications  - Monitor routine hemodynamics

## 2023-02-17 NOTE — H&P ADULT - PROBLEM SELECTOR PLAN 3
Patient with calf tenderness on exam   - F/u b/l LE dopplers Patient with calf tenderness on exam   - F/u b/l LE dopplers    #Dysuria  Patient c/o dysuria  - F/u UA  - Consider f/u urine cx, pending UA results

## 2023-02-18 LAB
A1C WITH ESTIMATED AVERAGE GLUCOSE RESULT: 6.2 % — HIGH (ref 4–5.6)
ALBUMIN SERPL ELPH-MCNC: 3.5 G/DL — SIGNIFICANT CHANGE UP (ref 3.3–5)
ALP SERPL-CCNC: 59 U/L — SIGNIFICANT CHANGE UP (ref 40–120)
ALT FLD-CCNC: 32 U/L — SIGNIFICANT CHANGE UP (ref 12–78)
ANION GAP SERPL CALC-SCNC: 4 MMOL/L — LOW (ref 5–17)
AST SERPL-CCNC: 26 U/L — SIGNIFICANT CHANGE UP (ref 15–37)
BASOPHILS # BLD AUTO: 0.03 K/UL — SIGNIFICANT CHANGE UP (ref 0–0.2)
BASOPHILS NFR BLD AUTO: 0.5 % — SIGNIFICANT CHANGE UP (ref 0–2)
BILIRUB SERPL-MCNC: 0.4 MG/DL — SIGNIFICANT CHANGE UP (ref 0.2–1.2)
BUN SERPL-MCNC: 18 MG/DL — SIGNIFICANT CHANGE UP (ref 7–23)
CALCIUM SERPL-MCNC: 8.9 MG/DL — SIGNIFICANT CHANGE UP (ref 8.5–10.1)
CHLORIDE SERPL-SCNC: 112 MMOL/L — HIGH (ref 96–108)
CHOLEST SERPL-MCNC: 144 MG/DL — SIGNIFICANT CHANGE UP
CO2 SERPL-SCNC: 26 MMOL/L — SIGNIFICANT CHANGE UP (ref 22–31)
CREAT SERPL-MCNC: 0.73 MG/DL — SIGNIFICANT CHANGE UP (ref 0.5–1.3)
EGFR: 82 ML/MIN/1.73M2 — SIGNIFICANT CHANGE UP
EOSINOPHIL # BLD AUTO: 0.5 K/UL — SIGNIFICANT CHANGE UP (ref 0–0.5)
EOSINOPHIL NFR BLD AUTO: 8.1 % — HIGH (ref 0–6)
ESTIMATED AVERAGE GLUCOSE: 131 MG/DL — HIGH (ref 68–114)
GLUCOSE SERPL-MCNC: 109 MG/DL — HIGH (ref 70–99)
HCT VFR BLD CALC: 39.2 % — SIGNIFICANT CHANGE UP (ref 34.5–45)
HDLC SERPL-MCNC: 46 MG/DL — LOW
HGB BLD-MCNC: 12.2 G/DL — SIGNIFICANT CHANGE UP (ref 11.5–15.5)
IMM GRANULOCYTES NFR BLD AUTO: 0.3 % — SIGNIFICANT CHANGE UP (ref 0–0.9)
LIPID PNL WITH DIRECT LDL SERPL: 65 MG/DL — SIGNIFICANT CHANGE UP
LYMPHOCYTES # BLD AUTO: 1.66 K/UL — SIGNIFICANT CHANGE UP (ref 1–3.3)
LYMPHOCYTES # BLD AUTO: 27 % — SIGNIFICANT CHANGE UP (ref 13–44)
MCHC RBC-ENTMCNC: 27.2 PG — SIGNIFICANT CHANGE UP (ref 27–34)
MCHC RBC-ENTMCNC: 31.1 GM/DL — LOW (ref 32–36)
MCV RBC AUTO: 87.5 FL — SIGNIFICANT CHANGE UP (ref 80–100)
MONOCYTES # BLD AUTO: 0.51 K/UL — SIGNIFICANT CHANGE UP (ref 0–0.9)
MONOCYTES NFR BLD AUTO: 8.3 % — SIGNIFICANT CHANGE UP (ref 2–14)
NEUTROPHILS # BLD AUTO: 3.42 K/UL — SIGNIFICANT CHANGE UP (ref 1.8–7.4)
NEUTROPHILS NFR BLD AUTO: 55.8 % — SIGNIFICANT CHANGE UP (ref 43–77)
NON HDL CHOLESTEROL: 97 MG/DL — SIGNIFICANT CHANGE UP
NRBC # BLD: 0 /100 WBCS — SIGNIFICANT CHANGE UP (ref 0–0)
PLATELET # BLD AUTO: 286 K/UL — SIGNIFICANT CHANGE UP (ref 150–400)
POTASSIUM SERPL-MCNC: 4 MMOL/L — SIGNIFICANT CHANGE UP (ref 3.5–5.3)
POTASSIUM SERPL-SCNC: 4 MMOL/L — SIGNIFICANT CHANGE UP (ref 3.5–5.3)
PROT SERPL-MCNC: 7 G/DL — SIGNIFICANT CHANGE UP (ref 6–8.3)
RBC # BLD: 4.48 M/UL — SIGNIFICANT CHANGE UP (ref 3.8–5.2)
RBC # FLD: 14.2 % — SIGNIFICANT CHANGE UP (ref 10.3–14.5)
SODIUM SERPL-SCNC: 142 MMOL/L — SIGNIFICANT CHANGE UP (ref 135–145)
TRIGL SERPL-MCNC: 163 MG/DL — HIGH
TSH SERPL-MCNC: 1.58 UIU/ML — SIGNIFICANT CHANGE UP (ref 0.36–3.74)
WBC # BLD: 6.14 K/UL — SIGNIFICANT CHANGE UP (ref 3.8–10.5)
WBC # FLD AUTO: 6.14 K/UL — SIGNIFICANT CHANGE UP (ref 3.8–10.5)

## 2023-02-18 PROCEDURE — 93970 EXTREMITY STUDY: CPT | Mod: 26

## 2023-02-18 PROCEDURE — 99233 SBSQ HOSP IP/OBS HIGH 50: CPT | Mod: GC

## 2023-02-18 RX ORDER — DONEPEZIL HYDROCHLORIDE 10 MG/1
10 TABLET, FILM COATED ORAL AT BEDTIME
Refills: 0 | Status: DISCONTINUED | OUTPATIENT
Start: 2023-02-18 | End: 2023-02-19

## 2023-02-18 RX ORDER — ATORVASTATIN CALCIUM 80 MG/1
20 TABLET, FILM COATED ORAL AT BEDTIME
Refills: 0 | Status: DISCONTINUED | OUTPATIENT
Start: 2023-02-18 | End: 2023-02-19

## 2023-02-18 RX ORDER — LOSARTAN POTASSIUM 100 MG/1
50 TABLET, FILM COATED ORAL DAILY
Refills: 0 | Status: DISCONTINUED | OUTPATIENT
Start: 2023-02-18 | End: 2023-02-19

## 2023-02-18 RX ORDER — CEFTRIAXONE 500 MG/1
1000 INJECTION, POWDER, FOR SOLUTION INTRAMUSCULAR; INTRAVENOUS EVERY 24 HOURS
Refills: 0 | Status: DISCONTINUED | OUTPATIENT
Start: 2023-02-18 | End: 2023-02-19

## 2023-02-18 RX ORDER — QUETIAPINE FUMARATE 200 MG/1
12.5 TABLET, FILM COATED ORAL ONCE
Refills: 0 | Status: COMPLETED | OUTPATIENT
Start: 2023-02-18 | End: 2023-02-18

## 2023-02-18 RX ORDER — OFLOXACIN 0.3 %
1 DROPS OPHTHALMIC (EYE) THREE TIMES A DAY
Refills: 0 | Status: DISCONTINUED | OUTPATIENT
Start: 2023-02-18 | End: 2023-02-19

## 2023-02-18 RX ORDER — SENNA PLUS 8.6 MG/1
2 TABLET ORAL AT BEDTIME
Refills: 0 | Status: DISCONTINUED | OUTPATIENT
Start: 2023-02-18 | End: 2023-02-19

## 2023-02-18 RX ORDER — PANTOPRAZOLE SODIUM 20 MG/1
40 TABLET, DELAYED RELEASE ORAL
Refills: 0 | Status: DISCONTINUED | OUTPATIENT
Start: 2023-02-18 | End: 2023-02-19

## 2023-02-18 RX ORDER — ESCITALOPRAM OXALATE 10 MG/1
20 TABLET, FILM COATED ORAL DAILY
Refills: 0 | Status: DISCONTINUED | OUTPATIENT
Start: 2023-02-18 | End: 2023-02-19

## 2023-02-18 RX ORDER — SODIUM CHLORIDE 9 MG/ML
1000 INJECTION INTRAMUSCULAR; INTRAVENOUS; SUBCUTANEOUS
Refills: 0 | Status: DISCONTINUED | OUTPATIENT
Start: 2023-02-18 | End: 2023-02-19

## 2023-02-18 RX ORDER — POLYETHYLENE GLYCOL 3350 17 G/17G
17 POWDER, FOR SOLUTION ORAL
Refills: 0 | Status: DISCONTINUED | OUTPATIENT
Start: 2023-02-18 | End: 2023-02-19

## 2023-02-18 RX ORDER — LEVOTHYROXINE SODIUM 125 MCG
75 TABLET ORAL DAILY
Refills: 0 | Status: DISCONTINUED | OUTPATIENT
Start: 2023-02-18 | End: 2023-02-19

## 2023-02-18 RX ORDER — GABAPENTIN 400 MG/1
600 CAPSULE ORAL AT BEDTIME
Refills: 0 | Status: DISCONTINUED | OUTPATIENT
Start: 2023-02-18 | End: 2023-02-19

## 2023-02-18 RX ORDER — IBUPROFEN 200 MG
600 TABLET ORAL ONCE
Refills: 0 | Status: COMPLETED | OUTPATIENT
Start: 2023-02-18 | End: 2023-02-18

## 2023-02-18 RX ADMIN — Medication 100 MILLIGRAM(S): at 22:23

## 2023-02-18 RX ADMIN — ESCITALOPRAM OXALATE 20 MILLIGRAM(S): 10 TABLET, FILM COATED ORAL at 16:04

## 2023-02-18 RX ADMIN — LOSARTAN POTASSIUM 50 MILLIGRAM(S): 100 TABLET, FILM COATED ORAL at 16:04

## 2023-02-18 RX ADMIN — Medication 600 MILLIGRAM(S): at 17:34

## 2023-02-18 RX ADMIN — ENOXAPARIN SODIUM 40 MILLIGRAM(S): 100 INJECTION SUBCUTANEOUS at 00:27

## 2023-02-18 RX ADMIN — DONEPEZIL HYDROCHLORIDE 10 MILLIGRAM(S): 10 TABLET, FILM COATED ORAL at 22:21

## 2023-02-18 RX ADMIN — Medication 81 MILLIGRAM(S): at 10:45

## 2023-02-18 RX ADMIN — Medication 650 MILLIGRAM(S): at 10:44

## 2023-02-18 RX ADMIN — GABAPENTIN 600 MILLIGRAM(S): 400 CAPSULE ORAL at 22:20

## 2023-02-18 RX ADMIN — ATORVASTATIN CALCIUM 20 MILLIGRAM(S): 80 TABLET, FILM COATED ORAL at 22:21

## 2023-02-18 RX ADMIN — Medication 600 MILLIGRAM(S): at 17:01

## 2023-02-18 RX ADMIN — Medication 100 MILLIGRAM(S): at 13:11

## 2023-02-18 RX ADMIN — SENNA PLUS 2 TABLET(S): 8.6 TABLET ORAL at 22:25

## 2023-02-18 RX ADMIN — Medication 100 MILLIGRAM(S): at 05:38

## 2023-02-18 RX ADMIN — CEFTRIAXONE 100 MILLIGRAM(S): 500 INJECTION, POWDER, FOR SOLUTION INTRAMUSCULAR; INTRAVENOUS at 10:44

## 2023-02-18 RX ADMIN — Medication 1 DROP(S): at 22:22

## 2023-02-18 RX ADMIN — Medication 650 MILLIGRAM(S): at 11:03

## 2023-02-18 RX ADMIN — QUETIAPINE FUMARATE 12.5 MILLIGRAM(S): 200 TABLET, FILM COATED ORAL at 00:27

## 2023-02-18 RX ADMIN — POLYETHYLENE GLYCOL 3350 17 GRAM(S): 17 POWDER, FOR SOLUTION ORAL at 17:01

## 2023-02-18 RX ADMIN — FAMOTIDINE 20 MILLIGRAM(S): 10 INJECTION INTRAVENOUS at 10:45

## 2023-02-18 RX ADMIN — Medication 3 MILLIGRAM(S): at 00:27

## 2023-02-18 NOTE — PROGRESS NOTE ADULT - PROBLEM SELECTOR PLAN 3
Patient with calf tenderness on exam   - Dopplers were negative     #Dysuria  Patient c/o dysuria  - F/u UA  - Consider f/u urine cx, pending UA results

## 2023-02-18 NOTE — PROGRESS NOTE ADULT - PROBLEM SELECTOR PLAN 8
Chronic, 2/2 to fall 4 months ago, follows with specialist  - Patient takes tylenol at home  - Continue tylenol PRN Chronic, 2/2 to fall 4 months ago, follows with specialist  - Patient takes tylenol at home  - Continue home Gabapentin   - Continue tylenol PRN

## 2023-02-18 NOTE — PROGRESS NOTE ADULT - PROBLEM SELECTOR PLAN 2
Patient with abdominal discomfort and leukocytosis likely 2/2 enteritis   - CT A/P NC: Questionable enteritis. No bowel obstruction. S/p appendectomy. Mild inflammation surrounding small bow  - C/w Rocephin and Flagyl

## 2023-02-18 NOTE — PROGRESS NOTE ADULT - ATTENDING COMMENTS
The patient was seen and evaluated independently by the attending physician.  - I have personally reviewed the pt's labs, imaging, micro data and consultant recommendations.    83yo F with PMH s/p angiography at [facility] 1 week ago, CAD s/p 1 stent, chronic back pain 2/2 to fall, anxiety, HTN, HLD, anxiety presents to [facility] ED with syncope. Found to have inflammation of small bowel on CT. Admitted for syncope workup and enteritis.     #syncope w/ LOC  #possible mild enteritis, low suspicion  #constipation    - can cont w/ IV abx for now but would limit her exposure to short course of abx in total, 5-7d max  - cont ivf for 24hrs and monitor for orthostatics  - spoke w/ family over the phone  - monitor on tele  - check tte  - supportive care  - dc planning likely tomorrow if w/u neg and no further clinical events

## 2023-02-18 NOTE — CONSULT NOTE ADULT - ASSESSMENT
81y/o seen at [facility] telemetry  History HTN, high cholesterol  About 4 months had a mechanical fall and fractured right scapula and right ankle (went to [facility])  CAD and s/p stent for shortness of breath a few years ago  Last week had cardiac catheterization and no intervention by Dr. Kemp  She was told that she had some ankle swelling and was suppose to get a diuretic (never was given)  S/P CATRACHITA  S/P appendectomy  S/P sinus surgery    Admitted for syncopal episode while playing boggle  She was seated  She took tums for nausea, dizziness and some abdominal pain  No prior syncopal episodes  Troponin-5.7    Impression  Probable vaso-vagal syncope as described above    Plan:  - Echocardiogram  - Keep well hydrated  - No bending from waist, only from knees, etc  - Get up slowly  - On Rocephin, Flagyl  - Abdominal CAT scan with ?enteritis  - Normal head CAT scan and lower extremity doppler study

## 2023-02-18 NOTE — PHYSICAL THERAPY INITIAL EVALUATION ADULT - PERTINENT HX OF CURRENT PROBLEM, REHAB EVAL
Patient is an 82-year-old female who has a history of coronary artery disease status post 1 stent, no MI.  Underwent routine angiography 1 week ago at [facility].  Her cardiologist is Dr. Tony Kemp.  She also has a history of chronic pain, takes tramadol and diazepam.  Denies a pill and Lexapro.  For anxiety.  History of hypertension hyperlipidemia.  She presents the emergency room by ambulance this afternoon for evaluation post syncope.  She was in her usual state of health until she began to feel abdominal discomfort, felt nauseous lightheaded and dizzy and then fainted.  The fainting was brief. There is no striking of her head.  She woke and her friends called 911.  She was with her friends playing a board game at the time and they witnessed this event.  Brought in by EMS for evaluation.  Patient complains of abdominal discomfort and distention.

## 2023-02-18 NOTE — PHYSICAL THERAPY INITIAL EVALUATION ADULT - STANDING BALANCE: STATIC
FAMILY MEDICINE TEACHING SERVICE  24/7 MD PAGER      [facility] INPATIENT ENCOUNTER  DAILY PROGRESS NOTE    Date:  10/13/2021, Hospital Day: 2  Attending Physician:  Jose Angel Otero MD     Subjective   SUBJECTIVE     HISTORY OF PRESENT ILLNESS:       Ebony Egan is a 52 year old female with diabetes mellitus, hiatal hernia, and history of laparoscopic cholecystectomy who presented with 4 days of increasing abdominal pain in bloating, yesterday with nonbloody non-foul smelling emesis. She denied fevers/chills/HA/CP/diarrhea/constipation.     ED course:   - Patient was in acute distress/pain, and had elevated blood pressure and HR. She was afebrile.  - CT ABD/Pelvis consistent with high grade small bowel obstruction, without perforation or abscess.    - Labs significant for elevated lactic acid 5.6 and leukocytosis 14.1. Otherwise normal CBC/CMP  She was admitted for suspected small bowel obstruction.    - Interventions included 2L bolus NS IVF, Zofran, morphine  - General surgery was consulted    She was admitted for small bowel obstruction.     Interval History:   - NPO without exceptions  - NG tube in place. Tube displaced overnight per RN   - XR ordered to check placement  - Surgery consulted (Dr. Haley)   - Advised to advance NG tube 6 centimeters   - No surgery indicated today   - PRNs used overnight   - Zofran IV   - Metoclopramide IV   - Morphine IV    Today, she reports belly pain is significantly improved. Initially vomited after NG placed, dark green/brown with odor of stool.  Tolerated next NG placement, but notes tube feels uncomfortable in her throat. She denies HA/CP/SOB/fever/chills.     HISTORIES:     I have reviewed the past medical history, family history, social history, medications, and allergies listed in the medical record as obtained by nursing and support staff and agree with their documentation.    Objective   OBJECTIVE     VITAL SIGNS:    /82 (BP Location: RUE - Right upper  extremity)   Pulse (!) 110   Temp 98.4 °F (36.9 °C) (Oral)   Resp 20   Ht 4' 11\" (1.499 m)   Wt 58.8 kg (129 lb 11.2 oz)   LMP 10/22/2014   BMI 26.20 kg/m²   BSA 1.53 m²     Intake/Output  Report      10/12 0700 - 10/13 0659 10/13 0700 - 10/14 0659    Urine  0    Emesis  1    Drains 200     Total Output 200 1    Net -200 -1          Urine Occurrence  1 x        Admission weight: 58.8 kg (129 lb 11.2 oz), Weight change:      PHYSICAL EXAM:    GENERAL:  Alert, cooperative, no distress, appears stated age.  HEENT:  Sclerae white, conjunctivae clear, NG tube present. Moist mucous membranes.  NECK:  Supple, symmetrical, trachea midline.  CARDIO:  Regular rate and rhythm.  Normal S1, S2.  No murmurs, gallops, or rubs.  Strong pulses in all extremities.  LUNGS:  Clear to auscultation bilaterally, respirations unlabored.  ABDOMEN:  Soft, nontender, and nondistended.  Normal bowel sounds.  No rebound tenderness or guarding.  EXTREMITIES:  Warm and well perfused.  No clubbing or cyanosis. No peripheral edema noted.    SKIN:  Skin color, texture, turgor normal.   NEUROLOGIC:   Strength 5/5 bilateral upper and lower extremities.    PSYCH:  Normal mood and affect.  Thought process and judgment appropriate.     LABORATORY DATA:    Recent Labs   Lab 10/13/21  0512 10/12/21  2155 10/12/21  2145   SODIUM 139  --  138   POTASSIUM 4.0  --  3.5   CHLORIDE 107  --  101   CO2 24  --  24   BUN 10  --  15   CREATININE 0.71 0.70 0.82   GLUCOSE 176*  --  181*   BILIRUBIN 0.5  --  0.6   AST 17  --  12   GPT 18  --  25     Recent Labs   Lab 10/13/21  0512 10/12/21  2145   WBC 11.8* 14.1*   HGB 11.4* 12.8   HCT 35.1* 39.5    409     Recent Labs   Lab 10/13/21  0750   GLUCOSE BEDSIDE 173*     Recent Labs   Lab 10/13/21  0512 10/12/21  2145   ALBUMIN 3.0* 3.7   CALCIUM 8.1* 9.6   MG  --  2.0       MICROBIOLOGY:  Sars CoV2 negative    IMAGING STUDIES:      XR TUBE CHECK   Final Result   FINDINGS/IMPRESSION:      1.  Enteric tube  courses beneath the diaphragm, terminating over the region   of the gastric body.   2.  There is a nonobstructive bowel gas pattern.   3.  Cholecystectomy clips.   4.  Suboptimal visualization of moderate hiatal hernia.      I, Attending Radiologist Samuel Patton MD, have reviewed the images and   report and concur with these findings interpreted by Resident Radiologist,   Dimitri Hamilton MD.       XR TUBE CHECK   Final Result   FINDINGS/IMPRESSION:      1.  Enteric tube courses beneath the diaphragm, terminating over the region   of the gastric body.   2.  There is a nonobstructive bowel gas pattern.   3.  Cholecystectomy clips.   4.  Suboptimal visualization of moderate hiatal hernia.      I, Attending Radiologist Samuel Patton MD, have reviewed the images and   report and concur with these findings interpreted by Resident Radiologist,   Dimitri Hamilton MD.       CT ABDOMEN PELVIS W CONTRAST   Final Result   IMPRESSION:      High-grade partial bowel obstruction with transition point in right lower   quadrant. Multiple dilated loops of small bowel proximal to the transition   point measuring up to 3.1 cm. The bowel is decompressed distally. No   closed-loop obstruction. The colon is normal in caliber. No signs of   perforation or pneumatosis.      Results were immediately with Dr. Shepherd from general surgery at 11:20 PM.      I, Attending Radiologist Samuel Patton MD, have reviewed the images and   report and concur with these findings interpreted by Resident Radiologist,   Dimitri Hamilton MD.       XR CHEST AP OR PA - PORTABLE   Final Result   IMPRESSION: Retrocardiac opacity may be a moderate-sized hiatal hernia.              Assessment    ASSESSMENT & PLAN     Principal Problem:    Small bowel obstruction (CMS/HCC)  Active Problems:    Hyperlipidemia    Esophageal reflux    Controlled type 2 diabetes mellitus without complication, without long-term current use of insulin (CMS/HCC)    Hiatal hernia     Hyperlactatemia    Hypertension  Resolved Problems:    * No resolved hospital problems. *      Small bowel obstruction (CMS/HCC) suspect obstructive secondary to adhesions from cholesystectomy, no signs of inflammatory bowel disease nor mass on imaging.   - Surgery consulted    - We appreciate their recommendations   - NPO without exceptions   - NG tube in place   - Pain management with IV morphine   - Nausea management IV Zofran and metoclopramide    Hyperlactatemia   - Consider bowel ischemia   - Continue to trend lactic acid   - Imaging as indicated by symptoms   - IV fluids, 1L bolus LR, then resume 100mL/h LR    Hiatal hernia  Esophageal reflux   - Pantoprazole IV    Hyperlipidemia   - Holding statin while on strict NPO    Controlled type 2 diabetes mellitus without complication, without long-term current use of insulin (CMS/HCC)   - Holding metformin while on strict NPO   - Lispro correction dose per sliding scale   - Add BG checks four times daily with meals and at bedtime    Hypertension   - Holding losartan while on strict NPO      IVF:   • lactated ringers infusion 100 mL/hr at 10/13/21 0620     Electrolytes: replacement per protocol, N/A for dialysis patients  Diet:   Npo Diet Without Exceptions.  Lines/Drains/Access: peripheral IV, NG tube    DVT/VTE Prophylaxis:  VTE Pharmacologic Prophylaxis: heparin  VTE Mechanical Prophylaxis:  SCDs & TEDs    CODE STATUS Full Resuscitation    Disposition:  Assess after SBO treatment    This patient was discussed with Jose Angel Otero MD.    Mirlande Carlos MD PGY-1  Family Medicine Teaching Service  Please page  for questions or concerns.             fair plus

## 2023-02-18 NOTE — PHARMACOTHERAPY INTERVENTION NOTE - COMMENTS
Patient is a 82y F presenting with enteritis being treated empirically with ciprofloxacin 400 mg IV Q12 + metronidazole 500 mg IV Q8. Discussed with primary team, there is currently an IV ciprofloxacin shortage. If antibiotics necessary, recommend ceftriaxone 1000 mg IV daily + metronidazole. Accepted and order entered.    Ana Tamayo, PharmD  Clinical Pharmacy Specialist [phone]

## 2023-02-18 NOTE — PROGRESS NOTE ADULT - PROBLEM SELECTOR PLAN 9
Chronic, 124/72 stable on admission  - Patient reports that she is on losartan at home, unsure of dose  - Hold off on BP meds for now  - Monitor routine hemodynamics - Continue home Losartan

## 2023-02-18 NOTE — PROGRESS NOTE ADULT - PROBLEM SELECTOR PLAN 11
Chronic  - Continue home Lexapro     #Need for prophylactic measure  - Lovenox for DVT ppx    GOC: Full code.

## 2023-02-18 NOTE — PROGRESS NOTE ADULT - PROBLEM SELECTOR PLAN 1
Syncope likely possible vasovagal vs. orthostatic hypotension vs. arrhythmia  - Tele monitoring to r/o arrhythmia  - Check orthostatics  - Fall and aspiration precautions  - F/u TTE  - Check TSH, A1c, lipid profile, monitor electrolytes  - Cardio Dr. Forest Moreno consulted, recs appreciated

## 2023-02-18 NOTE — CONSULT NOTE ADULT - SUBJECTIVE AND OBJECTIVE BOX
CARDIOLOGY CONSULT NOTE    Patient is a 82y Female with a known history of :  Syncope [R55]    Enteritis [K52.9]    Calf tenderness [M79.669]    Elevated AST (SGOT) [R74.01]    Abnormal finding on CT scan [R93.89]    Back pain [M54.9]    GERD (gastroesophageal reflux disease) [K21.9]    HTN (hypertension) [I10]    HLD (hyperlipidemia) [E78.5]    Medication management [Z79.899]    Need for prophylactic measure [Z29.9]    Anxiety [F41.9]    CAD (coronary artery disease) [I25.10]      HPI:  83yo F with PMH s/p angiography at [facility] 1 week ago, CAD s/p 1 stent, chronic back pain 2/2 to fall, anxiety, HTN, HLD, presents to [facility] ED with syncope. Patient reports that she passed out while playing a board game with friends. Patient is AAOx3. History was taken with assistance from son Cruz via patient's phone. Patient reports that she felt very warm and flushed before she laid her head down on the table. Friends witnessed the episode which lasted for approximately 5 minutes. Friends called 911 for ambulance to bring her to the hospital. Patient also c/o abdominal discomfort that she describes as heartburn. Patient denies abdominal pain but admits to discomfort. Patient denies history of seizures, hitting her head.     In the ED:   Vitals: T 97F, HR 71, /72, RR 18, SpO2 100 on RA  Labs: WBC 17.41 BUN 27, AST 45  CT head: No acute intracranial hemorrhage mass effect, edema or midline shift.  CT A/P NC: Questionable enteritis. No bowel obstruction. S/p appendectomy. Mild inflammation surrounding small bowel. A 1.2 cm left adrenal adenoma. Small right renal cyst.  EKG: NSR 67  CXR: official read pending   Received in the ED Cipro x1, Flagyl x1, NS 1L bolus x1, Zofran x1 (17 Feb 2023 21:26)      REVIEW OF SYSTEMS:    CONSTITUTIONAL: No fever, weight loss, or fatigue  EYES: No eye pain, visual disturbances, or discharge  ENMT:  No difficulty hearing, tinnitus, vertigo; No sinus or throat pain  NECK: No pain or stiffness  BREASTS: No pain, masses, or nipple discharge  RESPIRATORY: No cough, wheezing, chills or hemoptysis; No shortness of breath  CARDIOVASCULAR: No chest pain, palpitations, dizziness, or leg swelling  GASTROINTESTINAL: No abdominal or epigastric pain. No nausea, vomiting, or hematemesis; No diarrhea or constipation. No melena or hematochezia.  GENITOURINARY: No dysuria, frequency, hematuria, or incontinence  NEUROLOGICAL: No headaches, memory loss, loss of strength, numbness, or tremors  SKIN: No itching, burning, rashes, or lesions   LYMPH NODES: No enlarged glands  ENDOCRINE: No heat or cold intolerance; No hair loss  MUSCULOSKELETAL: No joint pain or swelling; No muscle, back, or extremity pain  PSYCHIATRIC: No depression, anxiety, mood swings, or difficulty sleeping  HEME/LYMPH: No easy bruising, or bleeding gums  ALLERGY AND IMMUNOLOGIC: No hives or eczema    MEDICATIONS  (STANDING):  aspirin  chewable 81 milliGRAM(s) Oral daily  cefTRIAXone   IVPB 1000 milliGRAM(s) IV Intermittent every 24 hours  enoxaparin Injectable 40 milliGRAM(s) SubCutaneous every 24 hours  famotidine    Tablet 20 milliGRAM(s) Oral daily  metroNIDAZOLE  IVPB 500 milliGRAM(s) IV Intermittent every 8 hours  sodium chloride 0.9%. 1000 milliLiter(s) (75 mL/Hr) IV Continuous <Continuous>    MEDICATIONS  (PRN):  acetaminophen     Tablet .. 650 milliGRAM(s) Oral every 6 hours PRN Temp greater or equal to 38C (100.4F), Mild Pain (1 - 3)  aluminum hydroxide/magnesium hydroxide/simethicone Suspension 30 milliLiter(s) Oral every 4 hours PRN Dyspepsia  melatonin 3 milliGRAM(s) Oral at bedtime PRN Insomnia  ondansetron Injectable 4 milliGRAM(s) IV Push every 8 hours PRN Nausea and/or Vomiting      ALLERGIES: latex (Hives)  No Known Drug Allergies  Nuts (Hives)  shellfish (Hives)      FAMILY HISTORY:  Family history of skin cancer (Father, Mother)    Family history of Alzheimer&#x27;s disease (Father, Mother)    Family history of diabetes mellitus in mother (Mother)        Social History:  Alochol:   Smoking:   Drug Use:   Marital Status:     I&O's Detail    17 Feb 2023 07:01  -  18 Feb 2023 07:00  --------------------------------------------------------  IN:    IV PiggyBack: 100 mL    Oral Fluid: 200 mL    sodium chloride 0.9%: 300 mL  Total IN: 600 mL    OUT:  Total OUT: 0 mL    Total NET: 600 mL          PHYSICAL EXAMINATION:  -----------------------------  T(C): 36.6 (02-18-23 @ 05:23), Max: 36.6 (02-17-23 @ 23:55)  HR: 77 (02-18-23 @ 05:23) (71 - 77)  BP: 164/75 (02-18-23 @ 05:23) (121/69 - 164/75)  RR: 18 (02-18-23 @ 05:23) (16 - 18)  SpO2: 98% (02-18-23 @ 05:23) (97% - 100%)  Wt(kg): --    02-17 @ 07:01  -  02-18 @ 07:00  --------------------------------------------------------  IN:    IV PiggyBack: 100 mL    Oral Fluid: 200 mL    sodium chloride 0.9%: 300 mL  Total IN: 600 mL    OUT:  Total OUT: 0 mL    Total NET: 600 mL        Height (cm): 154.9 (02-17 @ 17:32)  Weight (kg): 54.4 (02-17 @ 17:32)  BMI (kg/m2): 22.7 (02-17 @ 17:32)  BSA (m2): 1.52 (02-17 @ 17:32)    Constitutional: well developed, normal appearance, well groomed, well nourished, no deformities and no acute distress.   Eyes: the conjunctiva exhibited no abnormalities and the eyelids demonstrated no xanthelasmas.   HEENT: normal oral mucosa, no oral pallor and no oral cyanosis.   Neck: normal jugular venous A waves present, normal jugular venous V waves present and no jugular venous munoz A waves.   Pulmonary: no respiratory distress, normal respiratory rhythm and effort, no accessory muscle use and lungs were clear to auscultation bilaterally. B/L scattered coarse rale  Cardiovascular: heart rate and rhythm were normal, normal S1 and S2 and no murmur, gallop, rub, heave or thrill are present.   Musculoskeletal: the gait could not be assessed.   Extremities: no clubbing of the fingernails, no localized cyanosis, no petechial hemorrhages and no ischemic changes. ?B/L trace edema  Skin: normal skin color and pigmentation, no rash, no venous stasis, no skin lesions, no skin ulcer and no xanthoma was observed.   Psychiatric: oriented to person, place, and time, the affect was normal, the mood was normal and not feeling anxious.     LABS:   --------  02-17    139  |  108  |  27<H>  ----------------------------<  97  3.9   |  22  |  0.86    Ca    9.2      17 Feb 2023 19:13  Mg     2.3     02-17    TPro  8.0  /  Alb  4.1  /  TBili  0.4  /  DBili  x   /  AST  45<H>  /  ALT  40  /  AlkPhos  71  02-17                         12.2   6.14  )-----------( 286      ( 18 Feb 2023 08:55 )             39.2                   RADIOLOGY:  -----------------        ECG: Pending

## 2023-02-18 NOTE — PROGRESS NOTE ADULT - PROBLEM SELECTOR PLAN 7
Chronic, history of stents x1   - Continue home medications: ASA 81  - Primary team to verify medications Chronic, history of stents x1   - Continue home medications: ASA 81

## 2023-02-19 ENCOUNTER — TRANSCRIPTION ENCOUNTER (OUTPATIENT)
Age: 83
End: 2023-02-19

## 2023-02-19 VITALS
OXYGEN SATURATION: 98 % | TEMPERATURE: 98 F | RESPIRATION RATE: 16 BRPM | DIASTOLIC BLOOD PRESSURE: 77 MMHG | HEART RATE: 66 BPM | SYSTOLIC BLOOD PRESSURE: 152 MMHG

## 2023-02-19 LAB
ALBUMIN SERPL ELPH-MCNC: 3.2 G/DL — LOW (ref 3.3–5)
ALP SERPL-CCNC: 58 U/L — SIGNIFICANT CHANGE UP (ref 40–120)
ALT FLD-CCNC: 31 U/L — SIGNIFICANT CHANGE UP (ref 12–78)
ANION GAP SERPL CALC-SCNC: 5 MMOL/L — SIGNIFICANT CHANGE UP (ref 5–17)
AST SERPL-CCNC: 24 U/L — SIGNIFICANT CHANGE UP (ref 15–37)
BASOPHILS # BLD AUTO: 0.03 K/UL — SIGNIFICANT CHANGE UP (ref 0–0.2)
BASOPHILS NFR BLD AUTO: 0.5 % — SIGNIFICANT CHANGE UP (ref 0–2)
BILIRUB SERPL-MCNC: 0.3 MG/DL — SIGNIFICANT CHANGE UP (ref 0.2–1.2)
BUN SERPL-MCNC: 24 MG/DL — HIGH (ref 7–23)
CALCIUM SERPL-MCNC: 8.3 MG/DL — LOW (ref 8.5–10.1)
CHLORIDE SERPL-SCNC: 109 MMOL/L — HIGH (ref 96–108)
CO2 SERPL-SCNC: 27 MMOL/L — SIGNIFICANT CHANGE UP (ref 22–31)
CREAT SERPL-MCNC: 0.73 MG/DL — SIGNIFICANT CHANGE UP (ref 0.5–1.3)
EGFR: 82 ML/MIN/1.73M2 — SIGNIFICANT CHANGE UP
EOSINOPHIL # BLD AUTO: 0.55 K/UL — HIGH (ref 0–0.5)
EOSINOPHIL NFR BLD AUTO: 8.3 % — HIGH (ref 0–6)
GLUCOSE SERPL-MCNC: 116 MG/DL — HIGH (ref 70–99)
HCT VFR BLD CALC: 37.7 % — SIGNIFICANT CHANGE UP (ref 34.5–45)
HGB BLD-MCNC: 11.4 G/DL — LOW (ref 11.5–15.5)
IMM GRANULOCYTES NFR BLD AUTO: 0.6 % — SIGNIFICANT CHANGE UP (ref 0–0.9)
LYMPHOCYTES # BLD AUTO: 2.05 K/UL — SIGNIFICANT CHANGE UP (ref 1–3.3)
LYMPHOCYTES # BLD AUTO: 31 % — SIGNIFICANT CHANGE UP (ref 13–44)
MCHC RBC-ENTMCNC: 26.6 PG — LOW (ref 27–34)
MCHC RBC-ENTMCNC: 30.2 GM/DL — LOW (ref 32–36)
MCV RBC AUTO: 88.1 FL — SIGNIFICANT CHANGE UP (ref 80–100)
MONOCYTES # BLD AUTO: 0.65 K/UL — SIGNIFICANT CHANGE UP (ref 0–0.9)
MONOCYTES NFR BLD AUTO: 9.8 % — SIGNIFICANT CHANGE UP (ref 2–14)
NEUTROPHILS # BLD AUTO: 3.3 K/UL — SIGNIFICANT CHANGE UP (ref 1.8–7.4)
NEUTROPHILS NFR BLD AUTO: 49.8 % — SIGNIFICANT CHANGE UP (ref 43–77)
NRBC # BLD: 0 /100 WBCS — SIGNIFICANT CHANGE UP (ref 0–0)
PLATELET # BLD AUTO: 280 K/UL — SIGNIFICANT CHANGE UP (ref 150–400)
POTASSIUM SERPL-MCNC: 4.2 MMOL/L — SIGNIFICANT CHANGE UP (ref 3.5–5.3)
POTASSIUM SERPL-SCNC: 4.2 MMOL/L — SIGNIFICANT CHANGE UP (ref 3.5–5.3)
PROT SERPL-MCNC: 6.5 G/DL — SIGNIFICANT CHANGE UP (ref 6–8.3)
RBC # BLD: 4.28 M/UL — SIGNIFICANT CHANGE UP (ref 3.8–5.2)
RBC # FLD: 14.6 % — HIGH (ref 10.3–14.5)
SODIUM SERPL-SCNC: 141 MMOL/L — SIGNIFICANT CHANGE UP (ref 135–145)
WBC # BLD: 6.62 K/UL — SIGNIFICANT CHANGE UP (ref 3.8–10.5)
WBC # FLD AUTO: 6.62 K/UL — SIGNIFICANT CHANGE UP (ref 3.8–10.5)

## 2023-02-19 PROCEDURE — 99285 EMERGENCY DEPT VISIT HI MDM: CPT | Mod: 25

## 2023-02-19 PROCEDURE — 83735 ASSAY OF MAGNESIUM: CPT

## 2023-02-19 PROCEDURE — 80061 LIPID PANEL: CPT

## 2023-02-19 PROCEDURE — 96374 THER/PROPH/DIAG INJ IV PUSH: CPT

## 2023-02-19 PROCEDURE — 70450 CT HEAD/BRAIN W/O DYE: CPT | Mod: MA

## 2023-02-19 PROCEDURE — 84484 ASSAY OF TROPONIN QUANT: CPT

## 2023-02-19 PROCEDURE — 93970 EXTREMITY STUDY: CPT

## 2023-02-19 PROCEDURE — 36415 COLL VENOUS BLD VENIPUNCTURE: CPT

## 2023-02-19 PROCEDURE — 83036 HEMOGLOBIN GLYCOSYLATED A1C: CPT

## 2023-02-19 PROCEDURE — 71045 X-RAY EXAM CHEST 1 VIEW: CPT

## 2023-02-19 PROCEDURE — 74176 CT ABD & PELVIS W/O CONTRAST: CPT | Mod: MA

## 2023-02-19 PROCEDURE — 84443 ASSAY THYROID STIM HORMONE: CPT

## 2023-02-19 PROCEDURE — 93005 ELECTROCARDIOGRAM TRACING: CPT

## 2023-02-19 PROCEDURE — 80053 COMPREHEN METABOLIC PANEL: CPT

## 2023-02-19 PROCEDURE — 93306 TTE W/DOPPLER COMPLETE: CPT

## 2023-02-19 PROCEDURE — 99239 HOSP IP/OBS DSCHRG MGMT >30: CPT

## 2023-02-19 PROCEDURE — 97162 PT EVAL MOD COMPLEX 30 MIN: CPT

## 2023-02-19 PROCEDURE — 85025 COMPLETE CBC W/AUTO DIFF WBC: CPT

## 2023-02-19 PROCEDURE — 87637 SARSCOV2&INF A&B&RSV AMP PRB: CPT

## 2023-02-19 PROCEDURE — 83690 ASSAY OF LIPASE: CPT

## 2023-02-19 RX ORDER — LIDOCAINE 4 G/100G
1 CREAM TOPICAL ONCE
Refills: 0 | Status: COMPLETED | OUTPATIENT
Start: 2023-02-19 | End: 2023-02-19

## 2023-02-19 RX ORDER — METRONIDAZOLE 500 MG
1 TABLET ORAL
Qty: 9 | Refills: 0
Start: 2023-02-19 | End: 2023-02-21

## 2023-02-19 RX ORDER — CEFPODOXIME PROXETIL 100 MG
1 TABLET ORAL
Qty: 6 | Refills: 0
Start: 2023-02-19 | End: 2023-02-21

## 2023-02-19 RX ORDER — POLYETHYLENE GLYCOL 3350 17 G/17G
17 POWDER, FOR SOLUTION ORAL
Qty: 0 | Refills: 0 | DISCHARGE
Start: 2023-02-19

## 2023-02-19 RX ORDER — SENNA PLUS 8.6 MG/1
2 TABLET ORAL
Qty: 0 | Refills: 0 | DISCHARGE
Start: 2023-02-19

## 2023-02-19 RX ADMIN — ENOXAPARIN SODIUM 40 MILLIGRAM(S): 100 INJECTION SUBCUTANEOUS at 00:04

## 2023-02-19 RX ADMIN — CEFTRIAXONE 100 MILLIGRAM(S): 500 INJECTION, POWDER, FOR SOLUTION INTRAMUSCULAR; INTRAVENOUS at 12:07

## 2023-02-19 RX ADMIN — Medication 81 MILLIGRAM(S): at 12:05

## 2023-02-19 RX ADMIN — LOSARTAN POTASSIUM 50 MILLIGRAM(S): 100 TABLET, FILM COATED ORAL at 05:21

## 2023-02-19 RX ADMIN — Medication 1 DROP(S): at 05:17

## 2023-02-19 RX ADMIN — POLYETHYLENE GLYCOL 3350 17 GRAM(S): 17 POWDER, FOR SOLUTION ORAL at 05:18

## 2023-02-19 RX ADMIN — Medication 3 MILLIGRAM(S): at 00:29

## 2023-02-19 RX ADMIN — Medication 75 MICROGRAM(S): at 05:21

## 2023-02-19 RX ADMIN — Medication 100 MILLIGRAM(S): at 05:16

## 2023-02-19 RX ADMIN — PANTOPRAZOLE SODIUM 40 MILLIGRAM(S): 20 TABLET, DELAYED RELEASE ORAL at 05:21

## 2023-02-19 RX ADMIN — LIDOCAINE 1 PATCH: 4 CREAM TOPICAL at 06:36

## 2023-02-19 RX ADMIN — ESCITALOPRAM OXALATE 20 MILLIGRAM(S): 10 TABLET, FILM COATED ORAL at 11:58

## 2023-02-19 NOTE — DISCHARGE NOTE PROVIDER - NSDCMRMEDTOKEN_GEN_ALL_CORE_FT
Aspir 81 oral delayed release tablet: 1 tab(s) orally once a day  cefpodoxime 100 mg oral tablet: 1 tab(s) orally 2 times a day   Crestor 5 mg oral tablet: 1 tab(s) orally once a day  donepezil 10 mg oral tablet: 1 tab(s) orally once a day (at bedtime)  gabapentin 300 mg oral capsule: 2 cap(s) orally once a day (at bedtime)  Lexapro 20 mg oral tablet: 1 tab(s) orally once a day  losartan 50 mg oral tablet: 1 tab(s) orally once a day  metroNIDAZOLE 500 mg oral tablet: 1 tab(s) orally 3 times a day   ofloxacin 0.3% ophthalmic solution: 1 drop(s) to each affected eye 3 times a day  omeprazole 40 mg oral delayed release capsule: 1 cap(s) orally once a day  polyethylene glycol 3350 oral powder for reconstitution: 17 gram(s) orally 2 times a day  senna leaf extract oral tablet: 2 tab(s) orally once a day (at bedtime)  Synthroid 75 mcg (0.075 mg) oral tablet: 1 tab(s) orally once a day

## 2023-02-19 NOTE — PROVIDER CONTACT NOTE (OTHER) - BACKGROUND
Admitted to tele to monitor after "passing out at home while playing board games with friends"  this is first arrythmia

## 2023-02-19 NOTE — DISCHARGE NOTE PROVIDER - NSDCCPCAREPLAN_GEN_ALL_CORE_FT
PRINCIPAL DISCHARGE DIAGNOSIS  Diagnosis: Syncope and collapse  Assessment and Plan of Treatment: Unclear definitive  etology. Suggest staying well hydrated. At least 35-40oz of liquids per day. Avoid caffeinated beverages. You were found to have moderate to severe aortic stenosis. This should be closely monitored by a cardiologist. Information for Dr. Moreno was provided. Suggest following up with him in 2 weeks to discuss further. There may be potential options available to intervene if your symptoms worsen or persist.      SECONDARY DISCHARGE DIAGNOSES  Diagnosis: Abdominal pain  Assessment and Plan of Treatment: Continue oral antibiotics as prescribed. Your symptoms are mild. 5 days of antiobiotics total is all that is recommended at present.

## 2023-02-19 NOTE — CARE COORDINATION ASSESSMENT. - NSPASTMEDSURGHISTORY_GEN_ALL_CORE_FT
PAST MEDICAL & SURGICAL HISTORY:  HLD (hyperlipidemia)      HTN (hypertension)      Anxiety      Chronic back pain      CAD (coronary artery disease)      History of back surgery      H/O sinus surgery      History of cosmetic surgery      S/P appendectomy      S/P hysterectomy

## 2023-02-19 NOTE — PROVIDER CONTACT NOTE (OTHER) - SITUATION
Patient admitted from home after having episode at home of passing out while playing board games with friends  Now has had 6 beats V-Tach  Patient awake BP 95/55 HR 72 no c/o chest pain or pressure

## 2023-02-19 NOTE — CARE COORDINATION ASSESSMENT. - OTHER PERTINENT DISCHARGE PLANNING INFORMATION:
CM met with the patient at the bedside and explained role of CM and transition care planning. Patient verbalized understanding. Per patient, she lives alone in a private condo. + stairs inside. Patient stated she is independent with ADL's/ambulation/driving PTA. Denies home care services PTA. CM provided direct contact information/resource folder and remains available.

## 2023-02-19 NOTE — DISCHARGE NOTE PROVIDER - PROVIDER TOKENS
PROVIDER:[TOKEN:[[id number]],FOLLOWUP:[2 weeks],ESTABLISHEDPATIENT:[T]],PROVIDER:[TOKEN:[[id number]],FOLLOWUP:[1 week],ESTABLISHEDPATIENT:[T]]

## 2023-02-19 NOTE — CARE COORDINATION ASSESSMENT. - NSDCPLANSERVICES_GEN_ALL_CORE
Patient is in agreement. Patient stated she is independent with medication management and outpatient follow up.     PT eval- no skilled PT needs.    Per MD, anticipate discharge today./No Anticipated Discharge Needs

## 2023-02-19 NOTE — DISCHARGE NOTE NURSING/CASE MANAGEMENT/SOCIAL WORK - PATIENT PORTAL LINK FT
You can access the FollowMyHealth Patient Portal offered by [facility] by registering at the following website: [email or web address]. By joining [facility]’s FollowMyHealth portal, you will also be able to view your health information using other applications (apps) compatible with our system.

## 2023-02-19 NOTE — PROGRESS NOTE ADULT - SUBJECTIVE AND OBJECTIVE BOX
Patient is a 82y Female with a known history of :  Syncope [R55]    Enteritis [K52.9]    Calf tenderness [M79.669]    Elevated AST (SGOT) [R74.01]    Abnormal finding on CT scan [R93.89]    Back pain [M54.9]    GERD (gastroesophageal reflux disease) [K21.9]    HTN (hypertension) [I10]    HLD (hyperlipidemia) [E78.5]    Medication management [Z79.899]    Need for prophylactic measure [Z29.9]    Anxiety [F41.9]    CAD (coronary artery disease) [I25.10]      HPI:  83yo F with PMH s/p angiography at [facility] 1 week ago, CAD s/p 1 stent, chronic back pain 2/2 to fall, anxiety, HTN, HLD, presents to [facility] ED with syncope. Patient reports that she passed out while playing a board game with friends. Patient is AAOx3. History was taken with assistance from son Cruz via patient's phone. Patient reports that she felt very warm and flushed before she laid her head down on the table. Friends witnessed the episode which lasted for approximately 5 minutes. Friends called 911 for ambulance to bring her to the hospital. Patient also c/o abdominal discomfort that she describes as heartburn. Patient denies abdominal pain but admits to discomfort. Patient denies history of seizures, hitting her head.     In the ED:   Vitals: T 97F, HR 71, /72, RR 18, SpO2 100 on RA  Labs: WBC 17.41 BUN 27, AST 45  CT head: No acute intracranial hemorrhage mass effect, edema or midline shift.  CT A/P NC: Questionable enteritis. No bowel obstruction. S/p appendectomy. Mild inflammation surrounding small bowel. A 1.2 cm left adrenal adenoma. Small right renal cyst.  EKG: NSR 67  CXR: official read pending   Received in the ED Cipro x1, Flagyl x1, NS 1L bolus x1, Zofran x1 (17 Feb 2023 21:26)      REVIEW OF SYSTEMS:    CONSTITUTIONAL: No fever, weight loss, or fatigue  EYES: No eye pain, visual disturbances, or discharge  ENMT:  No difficulty hearing, tinnitus, vertigo; No sinus or throat pain  NECK: No pain or stiffness  BREASTS: No pain, masses, or nipple discharge  RESPIRATORY: No cough, wheezing, chills or hemoptysis; No shortness of breath  CARDIOVASCULAR: No chest pain, palpitations, dizziness, or leg swelling  GASTROINTESTINAL: No abdominal or epigastric pain. No nausea, vomiting, or hematemesis; No diarrhea or constipation. No melena or hematochezia.  GENITOURINARY: No dysuria, frequency, hematuria, or incontinence  NEUROLOGICAL: No headaches, memory loss, loss of strength, numbness, or tremors  SKIN: No itching, burning, rashes, or lesions   LYMPH NODES: No enlarged glands  ENDOCRINE: No heat or cold intolerance; No hair loss  MUSCULOSKELETAL: No joint pain or swelling; No muscle, back, or extremity pain  PSYCHIATRIC: No depression, anxiety, mood swings, or difficulty sleeping  HEME/LYMPH: No easy bruising, or bleeding gums  ALLERGY AND IMMUNOLOGIC: No hives or eczema    MEDICATIONS  (STANDING):  aspirin  chewable 81 milliGRAM(s) Oral daily  atorvastatin 20 milliGRAM(s) Oral at bedtime  cefTRIAXone   IVPB 1000 milliGRAM(s) IV Intermittent every 24 hours  donepezil 10 milliGRAM(s) Oral at bedtime  enoxaparin Injectable 40 milliGRAM(s) SubCutaneous every 24 hours  escitalopram 20 milliGRAM(s) Oral daily  gabapentin 600 milliGRAM(s) Oral at bedtime  levothyroxine 75 MICROGram(s) Oral daily  losartan 50 milliGRAM(s) Oral daily  metroNIDAZOLE  IVPB 500 milliGRAM(s) IV Intermittent every 8 hours  ofloxacin 0.3% Solution 1 Drop(s) Both EYES three times a day  pantoprazole    Tablet 40 milliGRAM(s) Oral before breakfast  polyethylene glycol 3350 17 Gram(s) Oral two times a day  senna 2 Tablet(s) Oral at bedtime  sodium chloride 0.9%. 1000 milliLiter(s) (40 mL/Hr) IV Continuous <Continuous>    MEDICATIONS  (PRN):  acetaminophen     Tablet .. 650 milliGRAM(s) Oral every 6 hours PRN Temp greater or equal to 38C (100.4F), Mild Pain (1 - 3)  aluminum hydroxide/magnesium hydroxide/simethicone Suspension 30 milliLiter(s) Oral every 4 hours PRN Dyspepsia  melatonin 3 milliGRAM(s) Oral at bedtime PRN Insomnia  ondansetron Injectable 4 milliGRAM(s) IV Push every 8 hours PRN Nausea and/or Vomiting      ALLERGIES: latex (Hives)  No Known Drug Allergies  Nuts (Hives)  shellfish (Hives)      FAMILY HISTORY:  Family history of skin cancer (Father, Mother)    Family history of Alzheimer&#x27;s disease (Father, Mother)    Family history of diabetes mellitus in mother (Mother)        Social history:  Alochol:   Smoking:   Drug Use:   Marital Status:     PHYSICAL EXAMINATION:  -----------------------------  T(C): 36.9 (02-19-23 @ 04:26), Max: 36.9 (02-19-23 @ 04:26)  HR: 80 (02-19-23 @ 04:26) (69 - 80)  BP: 114/64 (02-19-23 @ 04:26) (113/65 - 169/76)  RR: 18 (02-19-23 @ 04:26) (17 - 18)  SpO2: 95% (02-19-23 @ 04:26) (92% - 98%)  Wt(kg): --        Constitutional: well developed, normal appearance, well groomed, well nourished, no deformities and no acute distress.   Eyes: the conjunctiva exhibited no abnormalities and the eyelids demonstrated no xanthelasmas.   HEENT: normal oral mucosa, no oral pallor and no oral cyanosis.   Neck: normal jugular venous A waves present, normal jugular venous V waves present and no jugular venous munoz A waves.   Pulmonary: no respiratory distress, normal respiratory rhythm and effort, no accessory muscle use and lungs were clear to auscultation bilaterally.   Cardiovascular: heart rate and rhythm were normal, normal S1 and S2 and no gallop, rub, heave or thrill are present. II/VI systolic murmur  Musculoskeletal: the gait could not be assessed.   Extremities: no clubbing of the fingernails, no localized cyanosis, no petechial hemorrhages and no ischemic changes.   Skin: normal skin color and pigmentation, no rash, no venous stasis, no skin lesions, no skin ulcer and no xanthoma was observed.   Psychiatric: oriented to person, place, and time, the affect was normal, the mood was normal and not feeling anxious.     LABS:   --------  02-18    142  |  112<H>  |  18  ----------------------------<  109<H>  4.0   |  26  |  0.73    Ca    8.9      18 Feb 2023 08:55  Mg     2.3     02-17    TPro  7.0  /  Alb  3.5  /  TBili  0.4  /  DBili  x   /  AST  26  /  ALT  32  /  AlkPhos  59  02-18                         12.2   6.14  )-----------( 286      ( 18 Feb 2023 08:55 )             39.2           144 mg/dL, --, 46 mg/dL<L>, 163 mg/dL<H>          Radiology:    < from: TTE Echo Complete w/o Contrast w/ Doppler (02.18.23 @ 10:12) >    ACC: [id number] EXAM:  ECHO TTE WO CON COMP W DOPP   ORDERED BY: YECENIA GOMEZ     PROCEDURE DATE:  02/18/2023          INTERPRETATION:  Height 155cm. weight 54.4kg. blood pressure 164/75.   Technician name KL. Indication for study syncope.    Aortic root 2.8 cm. Left atrium 2.9 cm. Septal wall thickness 1.1 cm.   Posterior wall thickness 0.9 cm. Left ventricular end-diastolic diameter   4.2 cm. Left ventricular end-systolic diameter 2.5 cm. Visually estimated   ejection fraction 65%.    The aortic root is calcified and of normal diameter. The aortic valve   appears be trileaflet, thickened with reduced excursion. The technician   reports a peak gradient of 42 mmHg, mean gradient of 25 mmHg and an area   of 0.81 sq cm. Left atrium is grosslynormal. The left ventricle is of   normal size, thickness and function. Visually estimated ejection fraction   65%. The right-sided heart chambers. Grossly normal. The mitral valve has   normal EF slope and excursion. There is no evidence for hemodynamically   significant mitral stenosis. On the color flow Doppler portion   examination mild aortic insufficiency suggested.    CONCLUSION:  1. Technically limited study due to patient's physical condition.  2. Moderately severe aortic stenosis with associated mild aortic   insufficiency as described above.  3. Normal size and function of the left ventricle.  4. Correlate clinically.    Impression: see above conclusion.    --- End of Report ---            UVALDO WILKERSON MD; Attending Cardiologist  This document has been electronically signed. Feb 18 2023  5:16PM    < end of copied text >  
Patient is a 82y old  Female who presents with a chief complaint of syncope, enteritis (18 Feb 2023 09:38)      INTERVAL HPI/OVERNIGHT EVENTS: Patient seen and examined at bedside. Patient states that she is hungry. Denies any palpitations, chest pain, light headed, or resp distress though does have abdominal discomfort.     MEDICATIONS  (STANDING):  aspirin  chewable 81 milliGRAM(s) Oral daily  cefTRIAXone   IVPB 1000 milliGRAM(s) IV Intermittent every 24 hours  enoxaparin Injectable 40 milliGRAM(s) SubCutaneous every 24 hours  famotidine    Tablet 20 milliGRAM(s) Oral daily  metroNIDAZOLE  IVPB 500 milliGRAM(s) IV Intermittent every 8 hours  sodium chloride 0.9%. 1000 milliLiter(s) (40 mL/Hr) IV Continuous <Continuous>    MEDICATIONS  (PRN):  acetaminophen     Tablet .. 650 milliGRAM(s) Oral every 6 hours PRN Temp greater or equal to 38C (100.4F), Mild Pain (1 - 3)  aluminum hydroxide/magnesium hydroxide/simethicone Suspension 30 milliLiter(s) Oral every 4 hours PRN Dyspepsia  melatonin 3 milliGRAM(s) Oral at bedtime PRN Insomnia  ondansetron Injectable 4 milliGRAM(s) IV Push every 8 hours PRN Nausea and/or Vomiting      Allergies    latex (Hives)  No Known Drug Allergies  Nuts (Hives)  shellfish (Hives)    Intolerances        REVIEW OF SYSTEMS:  CONSTITUTIONAL: No fever or chills  HEENT:  No headache, no sore throat  RESPIRATORY: No cough, wheezing, or shortness of breath  CARDIOVASCULAR: No chest pain, palpitations  GASTROINTESTINAL: + abd pain, nausea, denies vomiting, or diarrhea  GENITOURINARY: No dysuria, frequency, or hematuria  NEUROLOGICAL: no focal weakness or dizziness  MUSCULOSKELETAL: no myalgias     Vital Signs Last 24 Hrs  T(C): 36.3 (18 Feb 2023 11:44), Max: 36.6 (17 Feb 2023 23:55)  T(F): 97.3 (18 Feb 2023 11:44), Max: 97.9 (17 Feb 2023 23:55)  HR: 69 (18 Feb 2023 11:44) (69 - 77)  BP: 136/79 (18 Feb 2023 11:44) (121/69 - 164/75)  BP(mean): --  RR: 18 (18 Feb 2023 11:44) (16 - 18)  SpO2: 98% (18 Feb 2023 11:44) (97% - 100%)    Parameters below as of 18 Feb 2023 11:44  Patient On (Oxygen Delivery Method): room air        PHYSICAL EXAM:  GENERAL: NAD  HEENT:  anicteric, moist mucous membranes  CHEST/LUNG:  CTA b/l, no rales, wheezes, or rhonchi  HEART:  systolic murmur, S1, S2  ABDOMEN:  Abdomen distended and hard, tender to deep palpation   EXTREMITIES: no edema, cyanosis, or calf tenderness  NERVOUS SYSTEM: answers questions and follows commands appropriately    LABS:                        12.2   6.14  )-----------( 286      ( 18 Feb 2023 08:55 )             39.2     CBC Full  -  ( 18 Feb 2023 08:55 )  WBC Count : 6.14 K/uL  Hemoglobin : 12.2 g/dL  Hematocrit : 39.2 %  Platelet Count - Automated : 286 K/uL  Mean Cell Volume : 87.5 fl  Mean Cell Hemoglobin : 27.2 pg  Mean Cell Hemoglobin Concentration : 31.1 gm/dL  Auto Neutrophil # : 3.42 K/uL  Auto Lymphocyte # : 1.66 K/uL  Auto Monocyte # : 0.51 K/uL  Auto Eosinophil # : 0.50 K/uL  Auto Basophil # : 0.03 K/uL  Auto Neutrophil % : 55.8 %  Auto Lymphocyte % : 27.0 %  Auto Monocyte % : 8.3 %  Auto Eosinophil % : 8.1 %  Auto Basophil % : 0.5 %    18 Feb 2023 08:55    142    |  112    |  18     ----------------------------<  109    4.0     |  26     |  0.73     Ca    8.9        18 Feb 2023 08:55  Mg     2.3       17 Feb 2023 19:13    TPro  7.0    /  Alb  3.5    /  TBili  0.4    /  DBili  x      /  AST  26     /  ALT  32     /  AlkPhos  59     18 Feb 2023 08:55        CAPILLARY BLOOD GLUCOSE              RADIOLOGY & ADDITIONAL TESTS:  < from: US Duplex Venous Lower Ext Complete, Bilateral (02.18.23 @ 01:53) >    ACC: [id number] EXAM:  US DPLX LWR EXT VEINS COMPL BI   ORDERED BY: YECENIA GOMEZ     PROCEDURE DATE:  02/18/2023          INTERPRETATION:  CLINICAL INDICATION: CAD s/p stent, chronic back pain,   bilateral calf tenderness.    TECHNIQUE: Grayscale, color Doppler and spectral Doppler ultrasound was   utilized to evaluate bilateral lower extremity deep venous system.    COMPARISON: None.    FINDINGS: There is no obvious thrombus in bilateral common femoral veins,   femoral veins or popliteal veins. Visualized calf veins are patent.    IMPRESSION:    No obvious deep vein thrombosis in either lower extremity.    --- End of Report ---            AARON VILCHIS MD; Attending Radiologist  This document has been electronically signed. Feb 18 2023  3:09AM    < end of copied text >  < from: CT Abdomen and Pelvis No Cont (02.17.23 @ 19:34) >  ACC: [id number] EXAM:  CT ABDOMEN AND PELVIS   ORDERED BY: DOMITILA US     PROCEDURE DATE:  02/17/2023          INTERPRETATION:  CLINICAL INFORMATION: abd distention and pain causing   syncope ro vascular insult vs obstn vs infection PCT    COMPARISON: None.    CONTRAST/COMPLICATIONS:  IV Contrast: NONE  0 cc administered   0 cc discarded  Oral Contrast: NONE  Complications: None reported at time of study completion    PROCEDURE:  CT of the Abdomen and Pelvis was performed.  Sagittal and coronalreformats were performed.    FINDINGS:    LOWER CHEST: Within normal limits.  Bilateral breast implants.    LIVER: Within normal limits.  BILE DUCTS: Normal caliber.  GALLBLADDER: Within normal limits.  SPLEEN: Within normal limits.  PANCREAS: Withinnormal limits.  ADRENALS: A 1.2 cm left adrenal adenoma.  KIDNEYS/URETERS: Small right renal cyst.    BLADDER: Within normal limits.  REPRODUCTIVE ORGANS: Hysterectomy. No adnexal mass.    BOWEL: No bowel obstruction. Status post appendectomy.  Mild inflammation   surrounding small bowel.  PERITONEUM: No ascites.  VESSELS:  Within normal limits.  RETROPERITONEUM/LYMPH NODES: No lymphadenopathy.  ABDOMINAL WALL: Mild postsurgical change in the right groin, suboptimally   evaluated without IV contrast.  BONES: L5-S1 fusion. L5-S1 anterolisthesis noted.    IMPRESSION: Questionable enteritis.        --- End of Report ---            BERTHA LIGHT MD; Attending Radiologist  This document has been electronically signed. Feb 17 2023  7:53PM    < end of copied text >  < from: CT Head No Cont (02.17.23 @ 19:34) >    ACC: [id number] EXAM:  CT BRAIN   ORDERED BY: DOMITILA US     PROCEDURE DATE:  02/17/2023          INTERPRETATION:  CT OF THE HEAD WITHOUT CONTRAST    CLINICAL INDICATION: syncope.    TECHNIQUE: Volumetric CT acquisition was performed through the brain and   reviewed using brain and bone window technique.      COMPARISON: No prior studies have been submitted for comparison.    FINDINGS:    The ventricular and sulcal size and configuration is age appropriate.     There is no acute loss of gray-white differentiation. There are moderate   patchy areas of hypodensity in the periventricular and subcortical white   matter which are likely related to chronic microangiopathic changes.    There is no evidence of mass effect, midline shift, acute intracranial   hemorrhage, or extra-axial collections.     The calvarium is intact. The paranasal sinuses are clear.The mastoid air   cells are predominantly clear. The orbits are unremarkable.      IMPRESSION:  No acute intracranial hemorrhage mass effect, edema or midline shift.    --- End of Report ---            ANTOINE BONILLA MD; Attending Radiologist  This document has been electronically signed. Feb 17 2023  7:35PM    < end of copied text >  < from: Xray Chest 1 View- PORTABLE-Urgent (02.17.23 @ 18:37) >    ACC: [id number] EXAM:  XR CHEST PORTABLE URGENT 1V   ORDERED BY: DOMITILA US     PROCEDURE DATE:  02/17/2023          INTERPRETATION:  Chest portable    CLINICAL HISTORY: Syncope    Comparison:  None available    Normal heart and mediastinum. Lungs clear with linear   fibrosis/atelectasis at the left base. Angles sharp. Bones without acute   abnormality. Bilateral breast augmentation devices are suggested    IMPRESSION: As above    --- End of Report ---            TERA WHITEHEAD MD; Attending Radiologist  This document has been electronically signed. Feb 18 2023 10:33AM    < end of copied text >      Personally reviewed.     Consultant(s) Notes Reviewed:  [x] YES  [ ] NO

## 2023-02-19 NOTE — DISCHARGE NOTE PROVIDER - CARE PROVIDER_API CALL
Jovana Moreno (MD)  Cardiovascular Disease; Internal Medicine  [address], [address]  [place] [address]  Phone: [phone]  Fax: [phone]  Established Patient  Follow Up Time: 2 weeks    Rj Soto)  Internal Medicine  [address], [address]  [place] [address]  Phone: [phone]  Fax: [phone]  Established Patient  Follow Up Time: 1 week

## 2023-02-19 NOTE — PROGRESS NOTE ADULT - ASSESSMENT
83y/o seen at [facility] telemetry  History HTN, high cholesterol  About 4 months had a mechanical fall and fractured right scapula and right ankle (went to [facility])  CAD and s/p stent for shortness of breath a few years ago  Last week had cardiac catheterization and no intervention by Dr. Kemp  She was told that she had some ankle swelling and was suppose to get a diuretic (never was given)  S/P CATRACHITA  S/P appendectomy  S/P sinus surgery    Admitted for syncopal episode while playing boggle  She was seated  She took tums for nausea, dizziness and some abdominal pain  No prior syncopal episodes  Troponin-5.7    2/19/23  Seen at [facility] telemetry  Has been ambulating without any complaints  Patient wants to go home  Case also discussed with 2 sons by phone    Impression  Probable vaso-vagal syncope as described above  Moderately-severe AS    Plan:  - Echocardiogram with moderately-severe AS-see above  - Keep well hydrated  - No bending from waist, only from knees, etc  - Get up slowly  - On Rocephin, Flagyl  - Abdominal CAT scan with ?enteritis  - Normal head CAT scan and lower extremity doppler study  - Patient is stable from a cardiac stand-point and may be discharged and follow-up with cardiology as out-patient
83yo F with PMH s/p angiography at [facility] 1 week ago, CAD s/p 1 stent, chronic back pain 2/2 to fall, anxiety, HTN, HLD, anxiety presents to [facility] ED with syncope. Found to have inflammation of small bowel on CT. Admitted for syncope workup and enteritis.

## 2023-02-19 NOTE — CARE COORDINATION ASSESSMENT. - NSCAREPROVIDERS_GEN_ALL_CORE_FT
CARE PROVIDERS:  Accepting Physician: Yolanda Joshua  Administration: Bruce Mcnulty  Administration: Mary Ferreira  Administration: Guanakito Patel  Admitting: Yolanda Joshua  Attending: Yolanda Joshua  Case Management: Mallory Kamara  Consultant: Forest Moreno  Consultant: Jovana Moreno  Covering Team: Timothy Burnett  Covering Team: Nara Burgos  Covering Team: George Pandya  ED Attending: Yefri Dolan  ED Nurse: Rita Edwards  Ordered: ADM, User  Ordered: ServiceAccount, SCMMLM  Override: Oz Boothe  Override: Wilmer Hennessy  PCA/Nursing Assistant: Sailaja Lopez  Primary Team: Luis Han  Primary Team: Betty Tafoya  Primary Team: Deanna Aceves  Primary Team: Yolanda Joshua  Primary Team: Irma Whitaker  Primary Team: Carlo Nobles  Registered Dietitian: Kristina Roe// Supp. Assoc.: [employer] [place]

## 2023-02-19 NOTE — DISCHARGE NOTE NURSING/CASE MANAGEMENT/SOCIAL WORK - NSDCPEFALRISK_GEN_ALL_CORE
For information on Fall & Injury Prevention, visit: [email or web address] OR  [email or web address] OR  https://www.cdc.gov/steadi/patient.html

## 2023-02-19 NOTE — DISCHARGE NOTE PROVIDER - HOSPITAL COURSE
FROM ADMISSION H+P:   HPI:  81yo F with PMH s/p angiography at [facility] 1 week ago, CAD s/p 1 stent, chronic back pain 2/2 to fall, anxiety, HTN, HLD, presents to [facility] ED with syncope. Patient reports that she passed out while playing a board game with friends. Patient is AAOx3. History was taken with assistance from son Cruz via patient's phone. Patient reports that she felt very warm and flushed before she laid her head down on the table. Friends witnessed the episode which lasted for approximately 5 minutes. Friends called 911 for ambulance to bring her to the hospital. Patient also c/o abdominal discomfort that she describes as heartburn.     ---  HOSPITAL COURSE:   Pt admitted with syncopal episode. She has short episode of nonsustained VT on tele but otherwise no new arrhythmias. TTE shows moderate to severe AS. It is unclear if this contributed to the patients symptoms. The patient has chronic diarrhea alternating with constipation. She was constipated while admitted and did not have a BM prior to dc. She was given bowel regimen but she is eager eager for dc. She was seen by cardiology and wants to follow up with Dr. Moreno after discharge. Pt was started on antibiotics for possible enteritis. Symptoms are mild. She has no abd pain. Other than constipation she has no GI symptoms at present. Will suggest short course of po steroids after dc.     ---  CONSULTANTS:   cardio - collin   FROM ADMISSION H+P:   HPI:  83yo F with PMH s/p angiography at [facility] 1 week ago, CAD s/p 1 stent, chronic back pain 2/2 to fall, anxiety, HTN, HLD, presents to [facility] ED with syncope. Patient reports that she passed out while playing a board game with friends. Patient is AAOx3. History was taken with assistance from son Cruz via patient's phone. Patient reports that she felt very warm and flushed before she laid her head down on the table. Friends witnessed the episode which lasted for approximately 5 minutes. Friends called 911 for ambulance to bring her to the hospital. Patient also c/o abdominal discomfort that she describes as heartburn.     ---  HOSPITAL COURSE:   Pt admitted with syncopal episode. She has short episode of nonsustained VT on tele but otherwise no new arrhythmias. TTE shows moderate to severe AS. It is unclear if this contributed to the patients symptoms. The patient has chronic diarrhea alternating with constipation. She was constipated while admitted and did not have a BM prior to dc. She was given bowel regimen but she is eager eager for dc. She was seen by cardiology and wants to follow up with Dr. Moreno after discharge. Pt was started on antibiotics for possible enteritis. Symptoms are mild. She has no abd pain. Other than constipation she has no GI symptoms at present. Will suggest short course of po steroids after dc.     ---  CONSULTANTS:   Cardio - Josh

## 2023-02-19 NOTE — DISCHARGE NOTE PROVIDER - ATTENDING DISCHARGE PHYSICAL EXAMINATION:
Vital Signs Last 24 Hrs  T(C): 36.6 (19 Feb 2023 12:56), Max: 36.9 (19 Feb 2023 04:26)  T(F): 97.8 (19 Feb 2023 12:56), Max: 98.5 (19 Feb 2023 04:26)  HR: 66 (19 Feb 2023 12:56) (66 - 80)  BP: 152/77 (19 Feb 2023 12:56) (113/65 - 169/76)  BP(mean): --  RR: 16 (19 Feb 2023 12:56) (16 - 18)  SpO2: 98% (19 Feb 2023 12:56) (92% - 98%)    Parameters below as of 19 Feb 2023 12:56  Patient On (Oxygen Delivery Method): room air        GENERAL: NAD, nontoxic  HEENT:  anicteric, moist mucous membranes  CHEST/LUNG:  CTA b/l, no rales, wheezes, or rhonchi  HEART:  systolic murmur, S1, S2  ABDOMEN: nondistended, hypoactive bowel sounds  EXTREMITIES: no edema, cyanosis, or calf tenderness  NERVOUS SYSTEM: answers questions and follows commands appropriately

## 2023-02-19 NOTE — CHART NOTE - NSCHARTNOTEFT_GEN_A_CORE
The patient was seen and examined on the day of discharge by the attending physician. Pt stable for discharge. Please see discharge note for additional information regarding the hospital course and the day of discharge.
Data Detail Level: Printer-Friendly View Extended View  Confidential Drug Utilization Report  Search Terms: Twila Hester, 1940Search Date: 02/18/2023 00:11:13 AM  Searching on behalf of: [address] [place] [address]  The Drug Utilization Report below displays all of the controlled substance prescriptions, if any, that your patient has filled in the last twelve months. The information displayed on this report is compiled from pharmacy submissions to the Department, and accurately reflects the information as submitted by the pharmacies.    This report was requested by: Nara bernal | Reference #: [id number]    Others' Prescriptions  Patient Name: Twila Arboleda Date: 1940  Address: [address] [place] [id number]: Female  Rx Written	Rx Dispensed	Drug	Quantity	Days Supply	Prescriber Name	Prescriber Sally #	Payment Method	Dispenser  08/24/2022	08/24/2022	diazepam 5 mg tablet	23	30	Kim Rolon	[id number]	\[facility]\"" [facility]acy    Patient Name: Twila Arboleda Date: 1940  Address: [address] [place] [id number]: Female  Rx Written	Rx Dispensed	Drug	Quantity	Days Supply	Prescriber Name	Prescriber Sally #	Payment Method	Dispenser  12/12/2022	12/12/2022	diazepam 5 mg tablet	45	30	Kim Rolon	[id number]	Yung	[facility]  09/20/2022	09/21/2022	diazepam 5 mg tablet	45	30	Kim Rolon	[id number]	Yung	[facility]  06/27/2022	06/27/2022	tramadol hcl 50 mg tablet	14	7	Elana Caraballo	[id number]	Medicare	[facility]  06/01/2022	06/01/2022	temazepam 30 mg capsule	30	30	Rj Soto MD	[id number]	Yung	[facility]  04/23/2022	04/23/2022	temazepam 30 mg capsule	30	30	Rj Soto MD	[id number]	Yung	[facility]  03/28/2022	03/29/2022	temazepam 30 mg capsule	30	30	Rj Soto MD	[id number]	Yung	[facility]

## 2023-02-22 ENCOUNTER — APPOINTMENT (OUTPATIENT)
Dept: ORTHOPEDIC SURGERY | Facility: CLINIC | Age: 83
End: 2023-02-22
Payer: MEDICARE

## 2023-02-22 VITALS — DIASTOLIC BLOOD PRESSURE: 72 MMHG | HEART RATE: 71 BPM | SYSTOLIC BLOOD PRESSURE: 133 MMHG

## 2023-02-22 PROCEDURE — 99214 OFFICE O/P EST MOD 30 MIN: CPT

## 2023-02-28 RX ORDER — ASPIRIN/CALCIUM CARB/MAGNESIUM 324 MG
1 TABLET ORAL
Qty: 0 | Refills: 0 | DISCHARGE

## 2023-02-28 RX ORDER — GABAPENTIN 400 MG/1
2 CAPSULE ORAL
Qty: 0 | Refills: 0 | DISCHARGE

## 2023-02-28 RX ORDER — LEVOTHYROXINE SODIUM 125 MCG
1 TABLET ORAL
Qty: 0 | Refills: 0 | DISCHARGE

## 2023-02-28 RX ORDER — OFLOXACIN 0.3 %
1 DROPS OPHTHALMIC (EYE)
Qty: 0 | Refills: 0 | DISCHARGE

## 2023-02-28 RX ORDER — DONEPEZIL HYDROCHLORIDE 10 MG/1
1 TABLET, FILM COATED ORAL
Qty: 0 | Refills: 0 | DISCHARGE

## 2023-02-28 RX ORDER — ROSUVASTATIN CALCIUM 5 MG/1
1 TABLET ORAL
Qty: 0 | Refills: 0 | DISCHARGE

## 2023-02-28 RX ORDER — OMEPRAZOLE 10 MG/1
1 CAPSULE, DELAYED RELEASE ORAL
Qty: 0 | Refills: 0 | DISCHARGE

## 2023-02-28 RX ORDER — ESCITALOPRAM OXALATE 10 MG/1
1 TABLET, FILM COATED ORAL
Qty: 0 | Refills: 0 | DISCHARGE

## 2023-02-28 RX ORDER — LOSARTAN POTASSIUM 100 MG/1
1 TABLET, FILM COATED ORAL
Qty: 0 | Refills: 0 | DISCHARGE

## 2023-03-06 ENCOUNTER — APPOINTMENT (OUTPATIENT)
Dept: ORTHOPEDIC SURGERY | Facility: CLINIC | Age: 83
End: 2023-03-06
Payer: MEDICARE

## 2023-03-06 VITALS — BODY MASS INDEX: 22.08 KG/M2 | HEIGHT: 62 IN | WEIGHT: 120 LBS

## 2023-03-06 DIAGNOSIS — M11.20 OTHER CHONDROCALCINOSIS, UNSPECIFIED SITE: ICD-10-CM

## 2023-03-06 PROBLEM — M54.9 DORSALGIA, UNSPECIFIED: Chronic | Status: ACTIVE | Noted: 2023-02-17

## 2023-03-06 PROBLEM — I25.10 ATHEROSCLEROTIC HEART DISEASE OF NATIVE CORONARY ARTERY WITHOUT ANGINA PECTORIS: Chronic | Status: ACTIVE | Noted: 2023-02-17

## 2023-03-06 PROBLEM — I10 ESSENTIAL (PRIMARY) HYPERTENSION: Chronic | Status: ACTIVE | Noted: 2023-02-17

## 2023-03-06 PROBLEM — E78.5 HYPERLIPIDEMIA, UNSPECIFIED: Chronic | Status: ACTIVE | Noted: 2023-02-17

## 2023-03-06 PROBLEM — F41.9 ANXIETY DISORDER, UNSPECIFIED: Chronic | Status: ACTIVE | Noted: 2023-02-17

## 2023-03-06 PROCEDURE — 99213 OFFICE O/P EST LOW 20 MIN: CPT

## 2023-03-06 NOTE — HISTORY OF PRESENT ILLNESS
[6] : 6 [Localized] : localized [Injection therapy] : injection therapy [Walking] : walking [de-identified] : 01/23/23 left knee euflexxa # 3, no relief of symptoms\par \par 3/6/23: 1 month S/P Euflexxa Inj. No relief [FreeTextEntry1] : left knee  [de-identified] : euflexxa injections

## 2023-03-06 NOTE — IMAGING
[de-identified] : Varus deformity\par Mild effusion, no warmth, no ecchymosis\par Medial joint line tenderness to palpation \par Range of motion 0-110 with associated crepitus\par 5/5 quadriceps and hamstring strength\par Ligamentously stable\par Motor and sensory intact distally\par Mildly antalgic gait\par Negative Marni\par

## 2023-03-06 NOTE — ASSESSMENT
[FreeTextEntry1] : NO RELIEF WITH VISCO\par ABUNDANT CHONDROCALCINOSIS ON PLAIN FILMS, DISCUSSED MRI VS RHEUM REFERRAL \par ADVISED SECOND OPINION WITH ANOTHER KNEE SPECIALIST, NOT READY FOR TKA YET

## 2023-03-22 NOTE — DISCHARGE NOTE PROVIDER - NSDCACTIVITY_GEN_ALL_CORE
It is important for you to show up for 15 minutes prior to your scheduled appointment. It often times takes 15 minutes for the nurse to go through medications, get vitals, and perform other check in processes. Coming early allows you to have the appropriately scheduled time with your provider. If you are late for your appointment you likely will be asked to reschedule.      No heavy lifting/straining

## 2023-04-13 ENCOUNTER — APPOINTMENT (OUTPATIENT)
Dept: ORTHOPEDIC SURGERY | Facility: CLINIC | Age: 83
End: 2023-04-13
Payer: MEDICARE

## 2023-04-13 DIAGNOSIS — Z00.00 ENCOUNTER FOR GENERAL ADULT MEDICAL EXAMINATION W/OUT ABNORMAL FINDINGS: ICD-10-CM

## 2023-04-13 DIAGNOSIS — Z95.2 PRESENCE OF PROSTHETIC HEART VALVE: ICD-10-CM

## 2023-04-13 PROCEDURE — 73564 X-RAY EXAM KNEE 4 OR MORE: CPT | Mod: LT

## 2023-04-13 PROCEDURE — 99214 OFFICE O/P EST MOD 30 MIN: CPT | Mod: 25

## 2023-04-13 PROCEDURE — 20611 DRAIN/INJ JOINT/BURSA W/US: CPT | Mod: LT

## 2023-04-13 PROCEDURE — J3490M: CUSTOM | Mod: NC

## 2023-04-18 NOTE — IMAGING
[de-identified] : The patient is a well appearing 83 year year old female of their stated age.\par Patient ambulates with a normal gait.\par Negative straight leg raise bilateral\par \par General: in no acute distress, seated comfortably, moving easily\par Skin: No discoloration, rashes; on palpation skin is dry, \par Neuro: Normal sensation all dermatomes, motor all myotomes\par Vascular: Normal pulses, no edema, normal temperature\par Coordination and balance: Normal\par Psych: normal mood and affect, non pressured speech, alert and oriented x3\par \par Effected Knee:                         	\par ROM:  0-145 degrees\par \par Lachman: Negative\par Pivot Shift: Negative\par Anterior Drawer: Negative\par Posterior Drawer / Sag: Negative\par Varus Stress 0 degrees: Stable\par Varus Stress 30 degrees: Stable\par Valgus Stress 0 degrees: Stable\par Valgus Stress 30 degrees: Stable\par Medial Hira: Negative\par Lateral Hira: Negative\par Patella Glide: 2+\par Patella Apprehension: Negative\par Patella Grind: Negative\par \par Palpation:\par Medial Joint Line: Nontender\par Lateral Joint Line: Nontender\par Medial Collateral Ligament: Nontender\par Lateral Collateral Ligament/PLC: Nontender\par Distal Femur: Nontender\par Proximal Tibia: Nontender\par Tibial Tubercle: Nontender\par Distal Pole Patella: Nontender\par Quadriceps Tendon: Nontender &  Intact\par Patella Tendon: Nontender &  Intact\par Medial Distal Hamstring/PES: Nontender\par Lateral Distal Hamstring: Nontender & Stable\par Iliotibial Band: Nontender\par Medial Patellofemoral Ligament: Nontender\par Adductor: Nontender\par Proximal GSC-Plantaris: Nontender\par Calf: Supple & Nontender\par \par Inspection:\par Deformity: No\par Erythema: No\par Ecchymosis: No\par Abrasions: No\par Effusion: No\par Prepatellar Bursitis: No\par \par Neurologic Exam:\par Sensation L4-S1: Grossly Intact\par \par Motor Exam:\par Quadriceps: 5 out of 5\par Hamstrings: 5 out of 5\par EHL: 5 out of 5\par FHL: 5 out of 5\par TA: 5 out of 5\par GS: 5 out of 5\par \par Circulatory/Pulses:\par Dorsalis Pedis: 2+\par Posterior Tibialis: 2+\par \par Additional Pertinent Findings: None\par \par Contralateral Knee:                           	\par ROM: 0-145 degrees\par \par Other Pertinent Findings: None\par \par  [Bilateral] : knee bilaterally [There are no fractures, subluxations or dislocations. No significant abnormalities are seen] : There are no fractures, subluxations or dislocations. No significant abnormalities are seen [Moderate tricompartmental OA medial narrowing] : Moderate tricompartmental OA medial narrowing

## 2023-04-18 NOTE — DISCUSSION/SUMMARY
[de-identified] : Assessment: The patient is a 83 year old female with left knee pain and physical exam findings consistent with osteoarthritis.\par \par Patient and I discussed their symptoms. Discussed findings of today's exam and possible causes of patient's pain. Educated patient on their most probable diagnosis. Reviewed possible courses of treatment, and we collaboratively decided best course of treatment at this time will include:\par \par 1. CSI left knee\par \par The patient's current medication management of their orthopedic diagnosis was reviewed today: \par \par (1) We discussed a comprehensive treatment plan that included possible pharmaceutical management involving the use of prescription strength medications including but not limited to options such as oral Naprosyn 500mg BID, once daily Meloxicam 15 mg, or 500-650 mg Tylenol versus over the counter oral medications and topical prescription NSAID Pennsaid vs over the counter Voltaren gel. \par \par (2) There is a moderate risk of morbidity with further treatment, especially from use of prescription strength medications and possible side effects of these medications which include upset stomach with oral medications, skin reactions to topical medications and cardiac/renal issues with long term use. \par \par (3) I recommended that the patient follow-up with their medical physician to discuss any significant specific potential issues with long term medication use such as interactions with current medications or with exacerbation of underlying medical comorbidities. \par \par (4) The benefits and risks associated with use of injectable, oral or topical, prescription and over the counter anti-inflammatory medications were discussed with the patient. The patient voiced understanding of the risks including but not limited to bleeding, stroke, kidney dysfunction, heart disease, and were referred to the black box warning label for further information.\par \par Prior to appointment and during encounter with patient extensive medical records were reviewed including but not limited to, hospital records, out patient records, imaging results, and lab data. During this appointment the patient was examined, diagnoses were discussed and explained in a face to face manner. In addition extensive time was spent reviewing aforementioned diagnostic studies. Counseling including abnormal image results, differential diagnoses, treatment options, risk and benefits, lifestyle changes, current condition, and current medications was performed. Patient's comments, questions, and concerns were address and patient verbalized understanding.\par \par Follow up in 2-3 months.

## 2023-04-18 NOTE — HISTORY OF PRESENT ILLNESS
[de-identified] : The patient is a 83 year old R hand dominant F who presents today complaining of left knee pain.  Pt had Viscosupplementation and they did not help\par Date of Injury/Onset:  gradual\par Pain:    At Rest: 5/10 \par With Activity:  5/10 \par Mechanism of injury: gradual over time \par This is not a Work Related Injury being treated under Worker's Compensation.\par This is not an athletic injury occurring associated with an interscholastic or organized sports team.\par Quality of symptoms: burning shooting stabbing \par Improves with: rest \par Worse with: activity \par Prior treatment: euflexxa inj\par Prior Imaging: xrays \par School/Sport/Position/Occupation: Retired\par Additional Information: None\par

## 2023-04-18 NOTE — PROCEDURE
[FreeTextEntry3] : Patient Identification \par Name/: Verbal with patient and/or family \par  \par Procedure Verification: \par Procedure confirmed with patient or family/designee \par Consent for procedure: Verbal Consent Given \par Relevant documentation completed, reviewed, and signed \par Clinical indications for procedure confirmed \par  \par Time-out with all members of procedure team immediately prior to procedure: \par Correct patient identified. Agreement on procedure. Correct side and site. \par  \par US GUIDANCE KNEE INJECTION (STEROID) - LEFT \par After verbal consent and identification of the correct patient and correct site, the superolateral left knee was prepped using alcohol swabs and betadine. This was allowed time to air dry. A mixture of 1cc Celestone 6mg/ml, 2cc Lidocaine 1%, and 2cc Bupivacaine 0.5% was injected with US guidance into the suprapatellar pouch using a sterile 22G needle after ethyl chloride spray for skin anesthesia. The patient tolerated the procedure well. After-care instructions were provided and included instructions to ice the area and to call if redness, pain, or fever develop.Visualization of the needle and placement of the injection was performed without any complications. Ultrasound was used for visualization, precise injection in area of tear, and / or prior failure or difficult injection\par

## 2023-04-24 NOTE — IMAGING
Dapsone Pregnancy And Lactation Text: This medication is Pregnancy Category C and is not considered safe during pregnancy or breast feeding. [Right] : right shoulder [The fracture is in acceptable alignment. There is progression in healing seen] : The fracture is in acceptable alignment. There is progression in healing seen

## 2023-04-26 ENCOUNTER — APPOINTMENT (OUTPATIENT)
Dept: ORTHOPEDIC SURGERY | Facility: CLINIC | Age: 83
End: 2023-04-26
Payer: MEDICARE

## 2023-04-26 VITALS — WEIGHT: 120 LBS | BODY MASS INDEX: 22.08 KG/M2 | HEIGHT: 62 IN

## 2023-04-26 DIAGNOSIS — M43.16 SPONDYLOLISTHESIS, LUMBAR REGION: ICD-10-CM

## 2023-04-26 PROCEDURE — 99214 OFFICE O/P EST MOD 30 MIN: CPT

## 2023-04-26 PROCEDURE — 72110 X-RAY EXAM L-2 SPINE 4/>VWS: CPT

## 2023-04-26 NOTE — ASSESSMENT
[FreeTextEntry1] : Pain management for possible face injections vs intrasept\par \par Discussion with patient at length given her pain generators and previous spinal fusion.  Pain may becoming from painful hardware or from degenerative changes at levels above.  Epidurals did not help but discussed possibility of trying other injection modalities to see if they help with pain.  DIscussed possibility of removal of hardware but given her age and risks of surgery likely not worth benefits.  \par \par FU PRN.

## 2023-04-26 NOTE — IMAGING
[de-identified] : Spine:\par Inspection/Palpation:\par No tenderness to palpation throughout Cervical/thoracic/lumbar spine. TTP L flank. over hardware \par No bony stepoffs, No lesions.\par \par Gait:\par non-antalgic, able to perform bilateral toe and heel rise. Able to perform tandem gait. \par \par Neurologic:\par Bilateral Lower Extremities 5/5 Iliopsoas/Quadriceps/Hamstrings/ Tibialis Anterior/ Gastrocnemius. Extensor Hallucis Longus/ Flexor Hallucis Longus except \par \par (-) SLR Bilaterally\par \par Sensation intact to light touch L2-S1\par \par Patellar/ Achilles reflex within normal limits.\par \par  X-ray Ap/Lateral/Flexion/Extension of lumbar spine were viewed and interpreted today. \par \par LSpine MRI: ([facility]) 2/14/23\par Stable postoperative changes status post posterior lateral spinal fusion L5-S1.\par Hardware artifact limits evaluation for bony bridging. There is a grade 2\par anterolisthesis at L5-S1 which is stable.\par \par There is again severe degenerative disc disease L5-S1. There is moderate thecal\par sac compression and moderate to severe foraminal stenosis.\par \par At L3-L4 there is moderate thecal sac compression and mild to moderate foraminal\par stenosis.\par \par At L1-L2 there is a sequestered disc fragment just superior to the disc space\par and resulting in mild thecal sac compression.\par

## 2023-04-26 NOTE — HISTORY OF PRESENT ILLNESS
[Lower back] : lower back [9] : 9 [Dull/Aching] : dull/aching [Radiating] : radiating [Sharp] : sharp [Stabbing] : stabbing [Constant] : constant [Retired] : Work status: retired [de-identified] : 4/26/23 82 yo female presenting with lower back pain worsening over the last 6 months. Pain is mostly left sided flank. Radiates into buttock. Denies radiating pain down leg. No N/T. No increased weakness. No B/B changes.  Takes tylenol/ aleve prn with mild help. Does PT/ YANET with mild help. \par \par H/o Lumbar fusion 4 yrs ago - Dr Mendoza [] : no [de-identified] : x-ray

## 2023-05-11 ENCOUNTER — APPOINTMENT (OUTPATIENT)
Dept: PAIN MANAGEMENT | Facility: CLINIC | Age: 83
End: 2023-05-11
Payer: MEDICARE

## 2023-05-11 VITALS — HEIGHT: 61 IN | BODY MASS INDEX: 22.66 KG/M2 | WEIGHT: 120 LBS

## 2023-05-11 PROCEDURE — 99204 OFFICE O/P NEW MOD 45 MIN: CPT

## 2023-05-11 NOTE — DISCUSSION/SUMMARY
[de-identified] : After discussing various treatment options with the patient including but not limited to oral medications, physical therapy, exercise modalities as well as interventional spinal injections, we have decided with the following plan:\par \par - Continue Home exercises, stretching, activity modification, physical therapy, and conservative care.\par - MRI report and/or images was reviewed and discussed with the patient.\par - Recommend First Diagnostic Bilateral L2, L3,L4 Medial Branch Blocks under fluoroscopic guidance with image.\par - Patient presents with axial lumbar pain that has not responded to 3 months of conservative therapy including physical therapy or NSAID therapy.  The pain is interfering with activities of daily living and functionality. There is no radicular pain. The pain is exacerbated by facet loading / positive Kemps maneuver which is defined by pain reproduction with extension and rotation of the lumbar spine to the affected side.  The patient has not had a vertebral fusion at the levels of the proposed treatment.  There is no unexplained neurologic deficit.  There is no history of systemic infection, unstable medical condition, bleeding tendency, or local infection.  The injection is being performed to diagnose the facet joint as the source of the individual's pain, in preparation for a radiofrequency ablation. \par - The risks, benefits, contents and alternatives to injection were explained in full to the patient.  Risks outlined include but are not limited to infection, sepsis, bleeding, post-dural puncture headache, nerve damage, temporary increase in pain, syncopal episode, failure to resolve symptoms, allergic reaction, symptom recurrence, and elevation of blood sugar in diabetics. Cortisone may cause immunosuppression.  Patient understands the risks.  All questions were answered.  After discussion of options, patient requested an injection.  Information regarding the injection was given to the patient.  Which medications to stop prior to the injection was explained to the patient as well.\par - Follow up in 1-2 weeks post injection for re-evaluation.\par - Patient is on anticoagulation therapy and needs medical clearance to stop medication for the procedure (ASA and PLAVIX) \par

## 2023-05-11 NOTE — PHYSICAL EXAM
[de-identified] : Constitutional; Appears well, no apparent distress\par Ability to communicate: Normal \par Respiratory: non-labored breathing\par Skin: No rash noted\par Head: Normocephalic, atraumatic\par Neck: no visible thyroid enlargement\par Eyes: Extraocular movements intact\par Neurologic: Alert and oriented x3\par Psychiatric: normal mood, affect and behavior\par  [Extension] : extension [] : light touch intact throughout both lower extremities

## 2023-05-11 NOTE — HISTORY OF PRESENT ILLNESS
[Lower back] : lower back [10] : 10 [Sharp] : sharp [Shooting] : shooting [Constant] : constant [Household chores] : household chores [Meds] : meds [Nothing helps with pain getting better] : Nothing helps with pain getting better [Standing] : standing [Walking] : walking [FreeTextEntry1] : Initial HPI 05/11/23:\par Pain started many years ago and is across the lower back L>R with some radiation into the b/l buttocks described as a sharp pain. Little radiation down the legs. Saw Dr. Jv Sparks who recommended possible MBBs vs intracept. \par \par MRI Lumbar Spine 2/14/23 independently reviewed: \par Conservative Care: Has been doing PT with no relief \par Pain Medications: Tylenol, Aleve PRN\par Past Injections: ESIs with Dr. Haro with no relief; was offered SCS but is not interested \par Spine surgery: L5-S1 posterior lateral spinal fusion with hardware in 2019 with Dr. Manoj Mendoza \par Blood thinners: *pt takes 81mg Aspirin and PLAVIX (Dr. Kemp)\par  [] : no [FreeTextEntry9] : TYLENOL  [de-identified] : L MRI

## 2023-06-21 ENCOUNTER — APPOINTMENT (OUTPATIENT)
Dept: ORTHOPEDIC SURGERY | Facility: CLINIC | Age: 83
End: 2023-06-21
Payer: MEDICARE

## 2023-06-21 VITALS — WEIGHT: 120 LBS | BODY MASS INDEX: 22.66 KG/M2 | HEIGHT: 61 IN

## 2023-06-21 DIAGNOSIS — M51.26 OTHER INTERVERTEBRAL DISC DISPLACEMENT, LUMBAR REGION: ICD-10-CM

## 2023-06-21 DIAGNOSIS — Z98.1 ARTHRODESIS STATUS: ICD-10-CM

## 2023-06-21 DIAGNOSIS — M48.062 SPINAL STENOSIS, LUMBAR REGION WITH NEUROGENIC CLAUDICATION: ICD-10-CM

## 2023-06-21 PROCEDURE — 99213 OFFICE O/P EST LOW 20 MIN: CPT

## 2023-06-21 PROCEDURE — 72070 X-RAY EXAM THORAC SPINE 2VWS: CPT

## 2023-06-21 RX ORDER — METHYLPREDNISOLONE 4 MG/1
4 TABLET ORAL
Qty: 1 | Refills: 0 | Status: ACTIVE | COMMUNITY
Start: 2023-06-21 | End: 1900-01-01

## 2023-06-21 NOTE — HISTORY OF PRESENT ILLNESS
[Lower back] : lower back [Dull/Aching] : dull/aching [Radiating] : radiating [Sharp] : sharp [Stabbing] : stabbing [Constant] : constant [Retired] : Work status: retired [Mid-back] : mid-back [10] : 10 [de-identified] : 6/21/23: reports sharp shooting pain in mid back for one week, would like to discuss today. \par \par 4/26/23 84 yo female presenting with lower back pain worsening over the last 6 months. Pain is mostly left sided flank. Radiates into buttock. Denies radiating pain down leg. No N/T. No increased weakness. No B/B changes.  Takes tylenol/ aleve prn with mild help. Does PT/ YANET with mild help. \par \par H/o Lumbar fusion 4 yrs ago - Dr Mendoza [] : no [de-identified] : x-ray

## 2023-06-21 NOTE — IMAGING
[de-identified] : Spine:\par Inspection/Palpation:\par No tenderness to palpation throughout Cervical/thoracic/lumbar spine. TTP L flank. over hardware \par No bony stepoffs, No lesions.\par \par Gait:\par non-antalgic, able to perform bilateral toe and heel rise. Able to perform tandem gait. \par \par Neurologic:\par Bilateral Lower Extremities 5/5 Iliopsoas/Quadriceps/Hamstrings/ Tibialis Anterior/ Gastrocnemius. Extensor Hallucis Longus/ Flexor Hallucis Longus except \par \par (-) SLR Bilaterally\par \par Sensation intact to light touch L2-S1\par \par Patellar/ Achilles reflex within normal limits.\par \par  X-ray Ap/Lateral/Flexion/Extension of lumbar spine were viewed and interpreted today. \par \par LSpine MRI: ([facility]) 2/14/23\par Stable postoperative changes status post posterior lateral spinal fusion L5-S1.\par Hardware artifact limits evaluation for bony bridging. There is a grade 2\par anterolisthesis at L5-S1 which is stable.\par \par There is again severe degenerative disc disease L5-S1. There is moderate thecal\par sac compression and moderate to severe foraminal stenosis.\par \par At L3-L4 there is moderate thecal sac compression and mild to moderate foraminal\par stenosis.\par \par At L1-L2 there is a sequestered disc fragment just superior to the disc space\par and resulting in mild thecal sac compression.\par

## 2023-06-21 NOTE — ASSESSMENT
[FreeTextEntry1] : With persistent thoracic and lumbar pain. \par saw pain management for possible facet injections\par recommend fu with pain management\par needs clearance prior to injections.\par \par FU PRN

## 2023-07-27 ENCOUNTER — APPOINTMENT (OUTPATIENT)
Dept: ORTHOPEDIC SURGERY | Facility: CLINIC | Age: 83
End: 2023-07-27
Payer: MEDICARE

## 2023-07-27 PROCEDURE — 99214 OFFICE O/P EST MOD 30 MIN: CPT | Mod: 25

## 2023-07-27 PROCEDURE — J3490M: CUSTOM | Mod: NC

## 2023-07-27 PROCEDURE — 20611 DRAIN/INJ JOINT/BURSA W/US: CPT | Mod: LT

## 2023-07-27 NOTE — IMAGING
[Bilateral] : knee bilaterally [There are no fractures, subluxations or dislocations. No significant abnormalities are seen] : There are no fractures, subluxations or dislocations. No significant abnormalities are seen [Moderate tricompartmental OA medial narrowing] : Moderate tricompartmental OA medial narrowing [de-identified] : The patient is a well appearing 83 year year old female of their stated age.\par Patient ambulates with a normal gait.\par Negative straight leg raise bilateral\par \par General: in no acute distress, seated comfortably, moving easily\par Skin: No discoloration, rashes; on palpation skin is dry, \par Neuro: Normal sensation all dermatomes, motor all myotomes\par Vascular: Normal pulses, no edema, normal temperature\par Coordination and balance: Normal\par Psych: normal mood and affect, non pressured speech, alert and oriented x3\par \par Effected Knee:                         	\par ROM:  0-145 degrees\par \par Lachman: Negative\par Pivot Shift: Negative\par Anterior Drawer: Negative\par Posterior Drawer / Sag: Negative\par Varus Stress 0 degrees: Stable\par Varus Stress 30 degrees: Stable\par Valgus Stress 0 degrees: Stable\par Valgus Stress 30 degrees: Stable\par Medial Hira: Negative\par Lateral Hira: Negative\par Patella Glide: 2+\par Patella Apprehension: Negative\par Patella Grind: Negative\par \par Palpation:\par Medial Joint Line: Nontender\par Lateral Joint Line: Nontender\par Medial Collateral Ligament: Nontender\par Lateral Collateral Ligament/PLC: Nontender\par Distal Femur: Nontender\par Proximal Tibia: Nontender\par Tibial Tubercle: Nontender\par Distal Pole Patella: Nontender\par Quadriceps Tendon: Nontender &  Intact\par Patella Tendon: Nontender &  Intact\par Medial Distal Hamstring/PES: Nontender\par Lateral Distal Hamstring: Nontender & Stable\par Iliotibial Band: Nontender\par Medial Patellofemoral Ligament: Nontender\par Adductor: Nontender\par Proximal GSC-Plantaris: Nontender\par Calf: Supple & Nontender\par \par Inspection:\par Deformity: No\par Erythema: No\par Ecchymosis: No\par Abrasions: No\par Effusion: No\par Prepatellar Bursitis: No\par \par Neurologic Exam:\par Sensation L4-S1: Grossly Intact\par \par Motor Exam:\par Quadriceps: 5 out of 5\par Hamstrings: 5 out of 5\par EHL: 5 out of 5\par FHL: 5 out of 5\par TA: 5 out of 5\par GS: 5 out of 5\par \par Circulatory/Pulses:\par Dorsalis Pedis: 2+\par Posterior Tibialis: 2+\par \par Additional Pertinent Findings: None\par \par Contralateral Knee:                           	\par ROM: 0-145 degrees\par \par Other Pertinent Findings: None\par \par

## 2023-07-27 NOTE — HISTORY OF PRESENT ILLNESS
[de-identified] : The patient is a 83 year old R hand dominant F who presents today complaining of left knee pain.  Pt had Viscosupplementation and they did not help\par Date of Injury/Onset:  gradual\par Pain:    At Rest: 5/10 \par With Activity:  5/10 \par Mechanism of injury: gradual over time \par This is not a Work Related Injury being treated under Worker's Compensation.\par This is not an athletic injury occurring associated with an interscholastic or organized sports team.\par Quality of symptoms: burning shooting stabbing \par Improves with: rest \par Worse with: activity \par Prior treatment: euflexxa inj\par Prior Imaging: xrays \par School/Sport/Position/Occupation: Retired\par Additional Information: None\par

## 2023-07-27 NOTE — DISCUSSION/SUMMARY
[de-identified] : Assessment: The patient is a 83 year old female with left knee pain and physical exam findings consistent with osteoarthritis.\par \par Patient and I discussed their symptoms. Discussed findings of today's exam and possible causes of patient's pain. Educated patient on their most probable diagnosis. Reviewed possible courses of treatment, and we collaboratively decided best course of treatment at this time will include:\par \par 1. CSI left knee\par \par The patient's current medication management of their orthopedic diagnosis was reviewed today: \par \par (1) We discussed a comprehensive treatment plan that included possible pharmaceutical management involving the use of prescription strength medications including but not limited to options such as oral Naprosyn 500mg BID, once daily Meloxicam 15 mg, or 500-650 mg Tylenol versus over the counter oral medications and topical prescription NSAID Pennsaid vs over the counter Voltaren gel. \par \par (2) There is a moderate risk of morbidity with further treatment, especially from use of prescription strength medications and possible side effects of these medications which include upset stomach with oral medications, skin reactions to topical medications and cardiac/renal issues with long term use. \par \par (3) I recommended that the patient follow-up with their medical physician to discuss any significant specific potential issues with long term medication use such as interactions with current medications or with exacerbation of underlying medical comorbidities. \par \par (4) The benefits and risks associated with use of injectable, oral or topical, prescription and over the counter anti-inflammatory medications were discussed with the patient. The patient voiced understanding of the risks including but not limited to bleeding, stroke, kidney dysfunction, heart disease, and were referred to the black box warning label for further information.\par \par Prior to appointment and during encounter with patient extensive medical records were reviewed including but not limited to, hospital records, out patient records, imaging results, and lab data. During this appointment the patient was examined, diagnoses were discussed and explained in a face to face manner. In addition extensive time was spent reviewing aforementioned diagnostic studies. Counseling including abnormal image results, differential diagnoses, treatment options, risk and benefits, lifestyle changes, current condition, and current medications was performed. Patient's comments, questions, and concerns were address and patient verbalized understanding.\par \par Follow up in 2-3 months.

## 2023-08-03 ENCOUNTER — APPOINTMENT (OUTPATIENT)
Dept: ORTHOPEDIC SURGERY | Facility: CLINIC | Age: 83
End: 2023-08-03
Payer: MEDICARE

## 2023-08-03 ENCOUNTER — APPOINTMENT (OUTPATIENT)
Dept: ORTHOPEDIC SURGERY | Facility: CLINIC | Age: 83
End: 2023-08-03

## 2023-08-03 PROCEDURE — 20610 DRAIN/INJ JOINT/BURSA W/O US: CPT | Mod: LT

## 2023-08-03 NOTE — PROCEDURE
[Large Joint Injection] : Large joint injection [Left] : of the left [Knee] : knee [Pain] : pain [Alcohol] : alcohol [Betadine] : betadine [Ethyl Chloride sprayed topically] : ethyl chloride sprayed topically [Sterile technique used] : sterile technique used [Euflexxa(20mg)] : 20mg of Euflexxa [#1] : series #1

## 2023-08-03 NOTE — IMAGING
[de-identified] : The patient is a well appearing 83 year year old female of their stated age.\par  Patient ambulates with a normal gait.\par  Negative straight leg raise bilateral\par  \par  General: in no acute distress, seated comfortably, moving easily\par  Skin: No discoloration, rashes; on palpation skin is dry, \par  Neuro: Normal sensation all dermatomes, motor all myotomes\par  Vascular: Normal pulses, no edema, normal temperature\par  Coordination and balance: Normal\par  Psych: normal mood and affect, non pressured speech, alert and oriented x3\par  \par  Effected Knee:                         	\par  ROM:  0-145 degrees\par  \par  Lachman: Negative\par  Pivot Shift: Negative\par  Anterior Drawer: Negative\par  Posterior Drawer / Sag: Negative\par  Varus Stress 0 degrees: Stable\par  Varus Stress 30 degrees: Stable\par  Valgus Stress 0 degrees: Stable\par  Valgus Stress 30 degrees: Stable\par  Medial Hira: Negative\par  Lateral Hira: Negative\par  Patella Glide: 2+\par  Patella Apprehension: Negative\par  Patella Grind: Negative\par  \par  Palpation:\par  Medial Joint Line: Nontender\par  Lateral Joint Line: Nontender\par  Medial Collateral Ligament: Nontender\par  Lateral Collateral Ligament/PLC: Nontender\par  Distal Femur: Nontender\par  Proximal Tibia: Nontender\par  Tibial Tubercle: Nontender\par  Distal Pole Patella: Nontender\par  Quadriceps Tendon: Nontender &  Intact\par  Patella Tendon: Nontender &  Intact\par  Medial Distal Hamstring/PES: Nontender\par  Lateral Distal Hamstring: Nontender & Stable\par  Iliotibial Band: Nontender\par  Medial Patellofemoral Ligament: Nontender\par  Adductor: Nontender\par  Proximal GSC-Plantaris: Nontender\par  Calf: Supple & Nontender\par  \par  Inspection:\par  Deformity: No\par  Erythema: No\par  Ecchymosis: No\par  Abrasions: No\par  Effusion: No\par  Prepatellar Bursitis: No\par  \par  Neurologic Exam:\par  Sensation L4-S1: Grossly Intact\par  \par  Motor Exam:\par  Quadriceps: 5 out of 5\par  Hamstrings: 5 out of 5\par  EHL: 5 out of 5\par  FHL: 5 out of 5\par  TA: 5 out of 5\par  GS: 5 out of 5\par  \par  Circulatory/Pulses:\par  Dorsalis Pedis: 2+\par  Posterior Tibialis: 2+\par  \par  Additional Pertinent Findings: None\par  \par  Contralateral Knee:                           	\par  ROM: 0-145 degrees\par  \par  Other Pertinent Findings: None\par  \par   [Bilateral] : knee bilaterally [There are no fractures, subluxations or dislocations. No significant abnormalities are seen] : There are no fractures, subluxations or dislocations. No significant abnormalities are seen [Moderate tricompartmental OA medial narrowing] : Moderate tricompartmental OA medial narrowing

## 2023-08-03 NOTE — REASON FOR VISIT
[FreeTextEntry2] : 8/3/23- Euflexxa 7/27/23: Follow up LEft knee. CSI at last visit was helpful for 2 wks.

## 2023-08-03 NOTE — HISTORY OF PRESENT ILLNESS
[de-identified] : The patient is a 83 year old R hand dominant F who presents today complaining of left knee pain.  Pt had Viscosupplementation and they did not help\par  Date of Injury/Onset:  gradual\par  Pain:    At Rest: 5/10 \par  With Activity:  5/10 \par  Mechanism of injury: gradual over time \par  This is not a Work Related Injury being treated under Worker's Compensation.\par  This is not an athletic injury occurring associated with an interscholastic or organized sports team.\par  Quality of symptoms: burning shooting stabbing \par  Improves with: rest \par  Worse with: activity \par  Prior treatment: euflexxa inj\par  Prior Imaging: xrays \par  School/Sport/Position/Occupation: Retired\par  Additional Information: None\par

## 2023-08-05 ENCOUNTER — APPOINTMENT (OUTPATIENT)
Dept: ORTHOPEDIC SURGERY | Facility: CLINIC | Age: 83
End: 2023-08-05
Payer: MEDICARE

## 2023-08-05 VITALS — HEIGHT: 61 IN | BODY MASS INDEX: 22.66 KG/M2 | WEIGHT: 120 LBS

## 2023-08-05 DIAGNOSIS — T24.022A: ICD-10-CM

## 2023-08-05 PROCEDURE — 99213 OFFICE O/P EST LOW 20 MIN: CPT

## 2023-08-05 NOTE — DISCUSSION/SUMMARY
[de-identified] : this is a 83 year old female with left knee pain. The patient admitted to using ice on her knee after the injection and did not place anything between her skin and ice. She stated the blister developed right away. This is was likely due to the ice. There is no signs of infection and likely not related to the euflexxa injection. she will keep the area clean and dry. advised not to rupture the blister. she has her appointment for the third injection on 8/10. if she develops any signs of infection she will contact the office or go to the ED.

## 2023-08-05 NOTE — HISTORY OF PRESENT ILLNESS
[7] : 7 [Localized] : localized [Throbbing] : throbbing [Walking] : walking [de-identified] : This is a 83 year old female with complaints of "reaction" to her last Euflexxa injection on 8/3/23. She reports redness to the skin [] : no [FreeTextEntry1] : left knee [FreeTextEntry5] : she is a pt of DR. Weiss on 8/3/2023 she received her 2nd euflexxa inj. on her left knee and developed a bad reaction, left knee is red and developed a blister  [de-identified] : movement [de-identified] : 8/3/2023 [de-identified] : Abhishek

## 2023-08-05 NOTE — PHYSICAL EXAM
[de-identified] : right knee range of motion 0-115 degrees. mild medial and lateral joint line tenderness.  there is a large blister located superior to the patella. no warmth. no erythema or signs of infection.

## 2023-08-08 ENCOUNTER — APPOINTMENT (OUTPATIENT)
Dept: PAIN MANAGEMENT | Facility: CLINIC | Age: 83
End: 2023-08-08

## 2023-08-10 ENCOUNTER — APPOINTMENT (OUTPATIENT)
Dept: ORTHOPEDIC SURGERY | Facility: CLINIC | Age: 83
End: 2023-08-10
Payer: MEDICARE

## 2023-08-10 ENCOUNTER — APPOINTMENT (OUTPATIENT)
Dept: ORTHOPEDIC SURGERY | Facility: CLINIC | Age: 83
End: 2023-08-10

## 2023-08-10 PROCEDURE — 20610 DRAIN/INJ JOINT/BURSA W/O US: CPT | Mod: LT

## 2023-08-10 NOTE — REASON FOR VISIT
[FreeTextEntry2] : 8/10/23- Euflexxa 7/27/23: Follow up LEft knee. CSI at last visit was helpful for 2 wks.

## 2023-08-10 NOTE — HISTORY OF PRESENT ILLNESS
[de-identified] : 8-10-23- for continued euflexxa inj left knee  The patient is a 83 year old R hand dominant F who presents today complaining of left knee pain.  Pt had Viscosupplementation and they did not help Date of Injury/Onset:  gradual Pain:    At Rest: 5/10  With Activity:  5/10  Mechanism of injury: gradual over time  This is not a Work Related Injury being treated under Worker's Compensation. This is not an athletic injury occurring associated with an interscholastic or organized sports team. Quality of symptoms: burning shooting stabbing  Improves with: rest  Worse with: activity  Prior treatment: euflexxa inj Prior Imaging: xrays  School/Sport/Position/Occupation: Retired Additional Information: None

## 2023-08-10 NOTE — IMAGING
[Bilateral] : knee bilaterally [There are no fractures, subluxations or dislocations. No significant abnormalities are seen] : There are no fractures, subluxations or dislocations. No significant abnormalities are seen [Moderate tricompartmental OA medial narrowing] : Moderate tricompartmental OA medial narrowing [de-identified] : The patient is a well appearing 83 year year old female of their stated age.\par  Patient ambulates with a normal gait.\par  Negative straight leg raise bilateral\par  \par  General: in no acute distress, seated comfortably, moving easily\par  Skin: No discoloration, rashes; on palpation skin is dry, \par  Neuro: Normal sensation all dermatomes, motor all myotomes\par  Vascular: Normal pulses, no edema, normal temperature\par  Coordination and balance: Normal\par  Psych: normal mood and affect, non pressured speech, alert and oriented x3\par  \par  Effected Knee:                         	\par  ROM:  0-145 degrees\par  \par  Lachman: Negative\par  Pivot Shift: Negative\par  Anterior Drawer: Negative\par  Posterior Drawer / Sag: Negative\par  Varus Stress 0 degrees: Stable\par  Varus Stress 30 degrees: Stable\par  Valgus Stress 0 degrees: Stable\par  Valgus Stress 30 degrees: Stable\par  Medial Hira: Negative\par  Lateral Hira: Negative\par  Patella Glide: 2+\par  Patella Apprehension: Negative\par  Patella Grind: Negative\par  \par  Palpation:\par  Medial Joint Line: Nontender\par  Lateral Joint Line: Nontender\par  Medial Collateral Ligament: Nontender\par  Lateral Collateral Ligament/PLC: Nontender\par  Distal Femur: Nontender\par  Proximal Tibia: Nontender\par  Tibial Tubercle: Nontender\par  Distal Pole Patella: Nontender\par  Quadriceps Tendon: Nontender &  Intact\par  Patella Tendon: Nontender &  Intact\par  Medial Distal Hamstring/PES: Nontender\par  Lateral Distal Hamstring: Nontender & Stable\par  Iliotibial Band: Nontender\par  Medial Patellofemoral Ligament: Nontender\par  Adductor: Nontender\par  Proximal GSC-Plantaris: Nontender\par  Calf: Supple & Nontender\par  \par  Inspection:\par  Deformity: No\par  Erythema: No\par  Ecchymosis: No\par  Abrasions: No\par  Effusion: No\par  Prepatellar Bursitis: No\par  \par  Neurologic Exam:\par  Sensation L4-S1: Grossly Intact\par  \par  Motor Exam:\par  Quadriceps: 5 out of 5\par  Hamstrings: 5 out of 5\par  EHL: 5 out of 5\par  FHL: 5 out of 5\par  TA: 5 out of 5\par  GS: 5 out of 5\par  \par  Circulatory/Pulses:\par  Dorsalis Pedis: 2+\par  Posterior Tibialis: 2+\par  \par  Additional Pertinent Findings: None\par  \par  Contralateral Knee:                           	\par  ROM: 0-145 degrees\par  \par  Other Pertinent Findings: None\par  \par

## 2023-08-10 NOTE — PROCEDURE
[Large Joint Injection] : Large joint injection [Left] : of the left [Knee] : knee [Pain] : pain [Alcohol] : alcohol [Betadine] : betadine [Ethyl Chloride sprayed topically] : ethyl chloride sprayed topically [Sterile technique used] : sterile technique used [Euflexxa(20mg)] : 20mg of Euflexxa [#2] : series #2 [] : Patient tolerated procedure well [Call if redness, pain or fever occur] : call if redness, pain or fever occur [Apply ice for 15min out of every hour for the next 12-24 hours as tolerated] : apply ice for 15 minutes out of every hour for the next 12-24 hours as tolerated [Patient was advised to rest the joint(s) for ____ days] : patient was advised to rest the joint(s) for [unfilled] days [Previous OTC use and PT nontherapeutic] : patient has tried OTC's including aspirin, Ibuprofen, Aleve, etc or prescription NSAIDS, and/or exercises at home and/or physical therapy without satisfactory response [Patient had decreased mobility in the joint] : patient had decreased mobility in the joint [Risks, benefits, alternatives discussed / Verbal consent obtained] : the risks benefits, and alternatives have been discussed, and verbal consent was obtained

## 2023-08-14 ENCOUNTER — APPOINTMENT (OUTPATIENT)
Dept: PAIN MANAGEMENT | Facility: CLINIC | Age: 83
End: 2023-08-14

## 2023-08-17 ENCOUNTER — APPOINTMENT (OUTPATIENT)
Dept: ORTHOPEDIC SURGERY | Facility: CLINIC | Age: 83
End: 2023-08-17
Payer: MEDICARE

## 2023-08-17 PROCEDURE — 20610 DRAIN/INJ JOINT/BURSA W/O US: CPT | Mod: LT

## 2023-08-17 NOTE — IMAGING
[Bilateral] : knee bilaterally [There are no fractures, subluxations or dislocations. No significant abnormalities are seen] : There are no fractures, subluxations or dislocations. No significant abnormalities are seen [Moderate tricompartmental OA medial narrowing] : Moderate tricompartmental OA medial narrowing [de-identified] : The patient is a well appearing 83 year year old female of their stated age.\par  Patient ambulates with a normal gait.\par  Negative straight leg raise bilateral\par  \par  General: in no acute distress, seated comfortably, moving easily\par  Skin: No discoloration, rashes; on palpation skin is dry, \par  Neuro: Normal sensation all dermatomes, motor all myotomes\par  Vascular: Normal pulses, no edema, normal temperature\par  Coordination and balance: Normal\par  Psych: normal mood and affect, non pressured speech, alert and oriented x3\par  \par  Effected Knee:                         	\par  ROM:  0-145 degrees\par  \par  Lachman: Negative\par  Pivot Shift: Negative\par  Anterior Drawer: Negative\par  Posterior Drawer / Sag: Negative\par  Varus Stress 0 degrees: Stable\par  Varus Stress 30 degrees: Stable\par  Valgus Stress 0 degrees: Stable\par  Valgus Stress 30 degrees: Stable\par  Medial Hira: Negative\par  Lateral Hira: Negative\par  Patella Glide: 2+\par  Patella Apprehension: Negative\par  Patella Grind: Negative\par  \par  Palpation:\par  Medial Joint Line: Nontender\par  Lateral Joint Line: Nontender\par  Medial Collateral Ligament: Nontender\par  Lateral Collateral Ligament/PLC: Nontender\par  Distal Femur: Nontender\par  Proximal Tibia: Nontender\par  Tibial Tubercle: Nontender\par  Distal Pole Patella: Nontender\par  Quadriceps Tendon: Nontender &  Intact\par  Patella Tendon: Nontender &  Intact\par  Medial Distal Hamstring/PES: Nontender\par  Lateral Distal Hamstring: Nontender & Stable\par  Iliotibial Band: Nontender\par  Medial Patellofemoral Ligament: Nontender\par  Adductor: Nontender\par  Proximal GSC-Plantaris: Nontender\par  Calf: Supple & Nontender\par  \par  Inspection:\par  Deformity: No\par  Erythema: No\par  Ecchymosis: No\par  Abrasions: No\par  Effusion: No\par  Prepatellar Bursitis: No\par  \par  Neurologic Exam:\par  Sensation L4-S1: Grossly Intact\par  \par  Motor Exam:\par  Quadriceps: 5 out of 5\par  Hamstrings: 5 out of 5\par  EHL: 5 out of 5\par  FHL: 5 out of 5\par  TA: 5 out of 5\par  GS: 5 out of 5\par  \par  Circulatory/Pulses:\par  Dorsalis Pedis: 2+\par  Posterior Tibialis: 2+\par  \par  Additional Pertinent Findings: None\par  \par  Contralateral Knee:                           	\par  ROM: 0-145 degrees\par  \par  Other Pertinent Findings: None\par  \par

## 2023-08-17 NOTE — PROCEDURE
[Large Joint Injection] : Large joint injection [Left] : of the left [Knee] : knee [Pain] : pain [Alcohol] : alcohol [Betadine] : betadine [Ethyl Chloride sprayed topically] : ethyl chloride sprayed topically [Sterile technique used] : sterile technique used [Euflexxa(20mg)] : 20mg of Euflexxa [#3] : series #3 [] : Patient tolerated procedure well [Call if redness, pain or fever occur] : call if redness, pain or fever occur [Apply ice for 15min out of every hour for the next 12-24 hours as tolerated] : apply ice for 15 minutes out of every hour for the next 12-24 hours as tolerated [Patient was advised to rest the joint(s) for ____ days] : patient was advised to rest the joint(s) for [unfilled] days [Previous OTC use and PT nontherapeutic] : patient has tried OTC's including aspirin, Ibuprofen, Aleve, etc or prescription NSAIDS, and/or exercises at home and/or physical therapy without satisfactory response [Patient had decreased mobility in the joint] : patient had decreased mobility in the joint [Risks, benefits, alternatives discussed / Verbal consent obtained] : the risks benefits, and alternatives have been discussed, and verbal consent was obtained

## 2023-08-17 NOTE — REASON FOR VISIT
[FreeTextEntry2] : 8/17/23- Euflexxa 7/27/23: Follow up LEft knee. CSI at last visit was helpful for 2 wks.

## 2023-08-17 NOTE — HISTORY OF PRESENT ILLNESS
[de-identified] : 8-17-23- for continued euflexxa inj left knee #3  The patient is a 83 year old R hand dominant F who presents today complaining of left knee pain.  Pt had Viscosupplementation and they did not help Date of Injury/Onset:  gradual Pain:    At Rest: 5/10  With Activity:  5/10  Mechanism of injury: gradual over time  This is not a Work Related Injury being treated under Worker's Compensation. This is not an athletic injury occurring associated with an interscholastic or organized sports team. Quality of symptoms: burning shooting stabbing  Improves with: rest  Worse with: activity  Prior treatment: euflexxa inj Prior Imaging: xrays  School/Sport/Position/Occupation: Retired Additional Information: None 
Rollator

## 2023-12-27 ENCOUNTER — APPOINTMENT (OUTPATIENT)
Dept: OTOLARYNGOLOGY | Facility: CLINIC | Age: 83
End: 2023-12-27
Payer: MEDICARE

## 2023-12-27 VITALS
HEART RATE: 80 BPM | HEIGHT: 61 IN | DIASTOLIC BLOOD PRESSURE: 77 MMHG | WEIGHT: 125 LBS | SYSTOLIC BLOOD PRESSURE: 148 MMHG | BODY MASS INDEX: 23.6 KG/M2

## 2023-12-27 DIAGNOSIS — J31.0 CHRONIC RHINITIS: ICD-10-CM

## 2023-12-27 PROCEDURE — 30903 CONTROL OF NOSEBLEED: CPT | Mod: LT

## 2023-12-27 PROCEDURE — 99213 OFFICE O/P EST LOW 20 MIN: CPT | Mod: 25

## 2023-12-27 RX ORDER — MUPIROCIN 20 MG/G
2 OINTMENT TOPICAL
Qty: 1 | Refills: 2 | Status: ACTIVE | COMMUNITY
Start: 2023-12-27 | End: 1900-01-01

## 2023-12-27 NOTE — HISTORY OF PRESENT ILLNESS
[de-identified] : RECURRENT EPISTAXIS DISCHARGED FROM REHAB ON BLOOD THINNERES MEDICAL HX REVIEWED Applied

## 2023-12-27 NOTE — PHYSICAL EXAM
[FreeTextEntry1] : SCARRED FACIAL SKIN [de-identified] : WEAK KISSELBACH CAUTERISED LEFT SIDE / DRY MUCOSA [Normal] : mucosa is normal [Midline] : trachea located in midline position

## 2024-01-10 ENCOUNTER — APPOINTMENT (OUTPATIENT)
Dept: OTOLARYNGOLOGY | Facility: CLINIC | Age: 84
End: 2024-01-10
Payer: MEDICARE

## 2024-01-10 VITALS — DIASTOLIC BLOOD PRESSURE: 90 MMHG | SYSTOLIC BLOOD PRESSURE: 160 MMHG

## 2024-01-10 VITALS — HEIGHT: 61 IN | BODY MASS INDEX: 23.6 KG/M2 | WEIGHT: 125 LBS

## 2024-01-10 DIAGNOSIS — D68.9 COAGULATION DEFECT, UNSPECIFIED: ICD-10-CM

## 2024-01-10 DIAGNOSIS — R04.0 EPISTAXIS: ICD-10-CM

## 2024-01-10 PROCEDURE — 30903 CONTROL OF NOSEBLEED: CPT | Mod: LT

## 2024-01-10 PROCEDURE — 99213 OFFICE O/P EST LOW 20 MIN: CPT | Mod: 25

## 2024-01-10 NOTE — PHYSICAL EXAM
[FreeTextEntry1] : SCARRED FACIAL SKIN [de-identified] : WEAK KISSELBACH CAUTERISED LEFT SIDE / DRY MUCOSA [Normal] : mucosa is normal [Midline] : trachea located in midline position

## 2024-01-10 NOTE — HISTORY OF PRESENT ILLNESS
[de-identified] : RECURRENT EPISTAXIS FOLLOW UP COAGULOPATHY LAST EPISTAXIS LEFT SIDE THIS AM/ NOT SEVER MEDICAL HX REVIEWED

## 2024-01-12 ENCOUNTER — APPOINTMENT (OUTPATIENT)
Dept: ORTHOPEDIC SURGERY | Facility: CLINIC | Age: 84
End: 2024-01-12
Payer: MEDICARE

## 2024-01-12 PROCEDURE — 99213 OFFICE O/P EST LOW 20 MIN: CPT | Mod: 25

## 2024-01-12 NOTE — PROCEDURE
[Large Joint Injection] : Large joint injection [Left] : of the left [Knee] : knee [Pain] : pain [Alcohol] : alcohol [Betadine] : betadine [Ethyl Chloride sprayed topically] : ethyl chloride sprayed topically [Sterile technique used] : sterile technique used [] : Patient tolerated procedure well [Call if redness, pain or fever occur] : call if redness, pain or fever occur [Apply ice for 15min out of every hour for the next 12-24 hours as tolerated] : apply ice for 15 minutes out of every hour for the next 12-24 hours as tolerated [Patient was advised to rest the joint(s) for ____ days] : patient was advised to rest the joint(s) for [unfilled] days [Previous OTC use and PT nontherapeutic] : patient has tried OTC's including aspirin, Ibuprofen, Aleve, etc or prescription NSAIDS, and/or exercises at home and/or physical therapy without satisfactory response [Patient had decreased mobility in the joint] : patient had decreased mobility in the joint [Risks, benefits, alternatives discussed / Verbal consent obtained] : the risks benefits, and alternatives have been discussed, and verbal consent was obtained [___ cc    3mg] :  Betamethasone (Celestone) ~Vcc of 3mg [___ cc    1%] : Lidocaine ~Vcc of 1%

## 2024-01-12 NOTE — HISTORY OF PRESENT ILLNESS
[de-identified] : 8-17-23- for continued euflexxa inj left knee #3  The patient is a 83 year old R hand dominant F who presents today complaining of left knee pain.  Pt had Viscosupplementation and they did not help Date of Injury/Onset:  gradual Pain:    At Rest: 5/10  With Activity:  5/10  Mechanism of injury: gradual over time  This is not a Work Related Injury being treated under Worker's Compensation. This is not an athletic injury occurring associated with an interscholastic or organized sports team. Quality of symptoms: burning shooting stabbing  Improves with: rest  Worse with: activity  Prior treatment: euflexxa inj Prior Imaging: xrays  School/Sport/Position/Occupation: Retired Additional Information: None

## 2024-01-12 NOTE — IMAGING
[Bilateral] : knee bilaterally [There are no fractures, subluxations or dislocations. No significant abnormalities are seen] : There are no fractures, subluxations or dislocations. No significant abnormalities are seen [Moderate tricompartmental OA medial narrowing] : Moderate tricompartmental OA medial narrowing [de-identified] : The patient is a well appearing 83 year year old female of their stated age.\par  Patient ambulates with a normal gait.\par  Negative straight leg raise bilateral\par  \par  General: in no acute distress, seated comfortably, moving easily\par  Skin: No discoloration, rashes; on palpation skin is dry, \par  Neuro: Normal sensation all dermatomes, motor all myotomes\par  Vascular: Normal pulses, no edema, normal temperature\par  Coordination and balance: Normal\par  Psych: normal mood and affect, non pressured speech, alert and oriented x3\par  \par  Effected Knee:                         	\par  ROM:  0-145 degrees\par  \par  Lachman: Negative\par  Pivot Shift: Negative\par  Anterior Drawer: Negative\par  Posterior Drawer / Sag: Negative\par  Varus Stress 0 degrees: Stable\par  Varus Stress 30 degrees: Stable\par  Valgus Stress 0 degrees: Stable\par  Valgus Stress 30 degrees: Stable\par  Medial Hiar: Negative\par  Lateral Hira: Negative\par  Patella Glide: 2+\par  Patella Apprehension: Negative\par  Patella Grind: Negative\par  \par  Palpation:\par  Medial Joint Line: Nontender\par  Lateral Joint Line: Nontender\par  Medial Collateral Ligament: Nontender\par  Lateral Collateral Ligament/PLC: Nontender\par  Distal Femur: Nontender\par  Proximal Tibia: Nontender\par  Tibial Tubercle: Nontender\par  Distal Pole Patella: Nontender\par  Quadriceps Tendon: Nontender &  Intact\par  Patella Tendon: Nontender &  Intact\par  Medial Distal Hamstring/PES: Nontender\par  Lateral Distal Hamstring: Nontender & Stable\par  Iliotibial Band: Nontender\par  Medial Patellofemoral Ligament: Nontender\par  Adductor: Nontender\par  Proximal GSC-Plantaris: Nontender\par  Calf: Supple & Nontender\par  \par  Inspection:\par  Deformity: No\par  Erythema: No\par  Ecchymosis: No\par  Abrasions: No\par  Effusion: No\par  Prepatellar Bursitis: No\par  \par  Neurologic Exam:\par  Sensation L4-S1: Grossly Intact\par  \par  Motor Exam:\par  Quadriceps: 5 out of 5\par  Hamstrings: 5 out of 5\par  EHL: 5 out of 5\par  FHL: 5 out of 5\par  TA: 5 out of 5\par  GS: 5 out of 5\par  \par  Circulatory/Pulses:\par  Dorsalis Pedis: 2+\par  Posterior Tibialis: 2+\par  \par  Additional Pertinent Findings: None\par  \par  Contralateral Knee:                           	\par  ROM: 0-145 degrees\par  \par  Other Pertinent Findings: None\par  \par

## 2024-01-12 NOTE — REASON FOR VISIT
[FreeTextEntry2] : 01/12/2024 requesting CSI left knee 8/17/23- Euflexxa 7/27/23: Follow up LEft knee. CSI at last visit was helpful for 2 wks.

## 2024-02-01 ENCOUNTER — APPOINTMENT (OUTPATIENT)
Dept: OTOLARYNGOLOGY | Facility: CLINIC | Age: 84
End: 2024-02-01

## 2024-02-12 ENCOUNTER — APPOINTMENT (OUTPATIENT)
Dept: PAIN MANAGEMENT | Facility: CLINIC | Age: 84
End: 2024-02-12

## 2024-02-19 ENCOUNTER — APPOINTMENT (OUTPATIENT)
Dept: ORTHOPEDIC SURGERY | Facility: CLINIC | Age: 84
End: 2024-02-19

## 2024-03-12 ENCOUNTER — APPOINTMENT (OUTPATIENT)
Dept: ORTHOPEDIC SURGERY | Facility: CLINIC | Age: 84
End: 2024-03-12
Payer: MEDICARE

## 2024-03-12 VITALS — BODY MASS INDEX: 23.6 KG/M2 | HEIGHT: 61 IN | WEIGHT: 125 LBS

## 2024-03-12 PROCEDURE — 20610 DRAIN/INJ JOINT/BURSA W/O US: CPT | Mod: LT

## 2024-03-12 PROCEDURE — 99213 OFFICE O/P EST LOW 20 MIN: CPT | Mod: 25

## 2024-03-12 NOTE — PROCEDURE
[Large Joint Injection] : Large joint injection [Knee] : knee [Left] : of the left [Pain] : pain [Alcohol] : alcohol [Ethyl Chloride sprayed topically] : ethyl chloride sprayed topically [Betadine] : betadine [Sterile technique used] : sterile technique used [] : Patient tolerated procedure well [Call if redness, pain or fever occur] : call if redness, pain or fever occur [Apply ice for 15min out of every hour for the next 12-24 hours as tolerated] : apply ice for 15 minutes out of every hour for the next 12-24 hours as tolerated [Patient was advised to rest the joint(s) for ____ days] : patient was advised to rest the joint(s) for [unfilled] days [Previous OTC use and PT nontherapeutic] : patient has tried OTC's including aspirin, Ibuprofen, Aleve, etc or prescription NSAIDS, and/or exercises at home and/or physical therapy without satisfactory response [Patient had decreased mobility in the joint] : patient had decreased mobility in the joint [Risks, benefits, alternatives discussed / Verbal consent obtained] : the risks benefits, and alternatives have been discussed, and verbal consent was obtained [Euflexxa(20mg)] : 20mg of Euflexxa [#1] : series #1

## 2024-03-12 NOTE — HISTORY OF PRESENT ILLNESS
[de-identified] : 8-17-23- for continued euflexxa inj left knee #3  The patient is a 83 year old R hand dominant F who presents today complaining of left knee pain.  Pt had Viscosupplementation and they did not help Date of Injury/Onset:  gradual Pain:    At Rest: 5/10  With Activity:  5/10  Mechanism of injury: gradual over time  This is not a Work Related Injury being treated under Worker's Compensation. This is not an athletic injury occurring associated with an interscholastic or organized sports team. Quality of symptoms: burning shooting stabbing  Improves with: rest  Worse with: activity  Prior treatment: euflexxa inj Prior Imaging: xrays  School/Sport/Position/Occupation: Retired Additional Information: None

## 2024-03-12 NOTE — REASON FOR VISIT
[FreeTextEntry2] : 03/12/2024 here for L knee pain, no relief with CSI January. She is doing PT, currently in [facility], s/p fall months ago. Exercises and leg machines aggravate. She is supposed to use a walker, but, is able to walk unassisted. No locking or clicking. She is requesting to repeat visco series. 01/12/2024 requesting CSI left knee 8/17/23- Euflexxa 7/27/23: Follow up LEft knee. CSI at last visit was helpful for 2 wks.

## 2024-03-12 NOTE — PHYSICAL EXAM
[Left] : left knee [NL (0)] : extension 0 degrees [Equivocal] : equivocal Kevan [5___] : hamstring 5[unfilled]/5 [] : no effusion [TWNoteComboBox7] : flexion 120 degrees

## 2024-03-19 ENCOUNTER — APPOINTMENT (OUTPATIENT)
Dept: ORTHOPEDIC SURGERY | Facility: CLINIC | Age: 84
End: 2024-03-19
Payer: MEDICARE

## 2024-03-19 ENCOUNTER — APPOINTMENT (OUTPATIENT)
Dept: GASTROENTEROLOGY | Facility: CLINIC | Age: 84
End: 2024-03-19
Payer: MEDICARE

## 2024-03-19 VITALS
DIASTOLIC BLOOD PRESSURE: 70 MMHG | OXYGEN SATURATION: 97 % | HEIGHT: 61 IN | BODY MASS INDEX: 22.66 KG/M2 | HEART RATE: 72 BPM | SYSTOLIC BLOOD PRESSURE: 120 MMHG | WEIGHT: 120 LBS

## 2024-03-19 DIAGNOSIS — K58.2 MIXED IRRITABLE BOWEL SYNDROME: ICD-10-CM

## 2024-03-19 DIAGNOSIS — K57.90 DIVERTICULOSIS OF INTESTINE, PART UNSPECIFIED, W/OUT PERFORATION OR ABSCESS W/OUT BLEEDING: ICD-10-CM

## 2024-03-19 PROCEDURE — 99212 OFFICE O/P EST SF 10 MIN: CPT | Mod: 25

## 2024-03-19 PROCEDURE — 20610 DRAIN/INJ JOINT/BURSA W/O US: CPT | Mod: LT

## 2024-03-19 PROCEDURE — 99214 OFFICE O/P EST MOD 30 MIN: CPT

## 2024-03-19 RX ORDER — PANTOPRAZOLE 40 MG/1
40 TABLET, DELAYED RELEASE ORAL DAILY
Qty: 90 | Refills: 3 | Status: ACTIVE | COMMUNITY
Start: 2024-03-19 | End: 1900-01-01

## 2024-03-19 NOTE — HISTORY OF PRESENT ILLNESS
[de-identified] : 8-17-23- for continued euflexxa inj left knee #3  The patient is a 83 year old R hand dominant F who presents today complaining of left knee pain.  Pt had Viscosupplementation and they did not help Date of Injury/Onset:  gradual Pain:    At Rest: 5/10  With Activity:  5/10  Mechanism of injury: gradual over time  This is not a Work Related Injury being treated under Worker's Compensation. This is not an athletic injury occurring associated with an interscholastic or organized sports team. Quality of symptoms: burning shooting stabbing  Improves with: rest  Worse with: activity  Prior treatment: euflexxa inj Prior Imaging: xrays  School/Sport/Position/Occupation: Retired Additional Information: None

## 2024-03-19 NOTE — PHYSICAL EXAM
[Left] : left knee [NL (0)] : extension 0 degrees [5___] : quadriceps 5[unfilled]/5 [] : negative Lachmann [Equivocal] : equivocal Kevan [TWNoteComboBox7] : flexion 120 degrees

## 2024-03-19 NOTE — PROCEDURE
[Left] : of the left [Large Joint Injection] : Large joint injection [Knee] : knee [Pain] : pain [Betadine] : betadine [Alcohol] : alcohol [Ethyl Chloride sprayed topically] : ethyl chloride sprayed topically [Sterile technique used] : sterile technique used [Euflexxa(20mg)] : 20mg of Euflexxa [] : Patient tolerated procedure well [#2] : series #2 [Apply ice for 15min out of every hour for the next 12-24 hours as tolerated] : apply ice for 15 minutes out of every hour for the next 12-24 hours as tolerated [Call if redness, pain or fever occur] : call if redness, pain or fever occur [Previous OTC use and PT nontherapeutic] : patient has tried OTC's including aspirin, Ibuprofen, Aleve, etc or prescription NSAIDS, and/or exercises at home and/or physical therapy without satisfactory response [Patient was advised to rest the joint(s) for ____ days] : patient was advised to rest the joint(s) for [unfilled] days [Risks, benefits, alternatives discussed / Verbal consent obtained] : the risks benefits, and alternatives have been discussed, and verbal consent was obtained [Patient had decreased mobility in the joint] : patient had decreased mobility in the joint

## 2024-03-19 NOTE — PHYSICAL EXAM
[Alert] : alert [Normal Voice/Communication] : normal voice/communication [Healthy Appearing] : healthy appearing [No Acute Distress] : no acute distress [Sclera] : the sclera and conjunctiva were normal [Hearing Threshold Finger Rub Not [place]] : hearing was normal [Normal Lips/Gums] : the lips and gums were normal [Oropharynx] : the oropharynx was normal [Normal Appearance] : the appearance of the neck was normal [No Neck Mass] : no neck mass was observed [No Respiratory Distress] : no respiratory distress [No Acc Muscle Use] : no accessory muscle use [Respiration, Rhythm And Depth] : normal respiratory rhythm and effort [Auscultation Breath Sounds / Voice Sounds] : lungs were clear to auscultation bilaterally [Heart Rate And Rhythm] : heart rate was normal and rhythm regular [Normal S1, S2] : normal S1 and S2 [Murmurs] : no murmurs [Bowel Sounds] : normal bowel sounds [Abdomen Tenderness] : non-tender [Abdomen Soft] : soft [No Masses] : no abdominal mass palpated [] : no hepatosplenomegaly [Oriented To Time, Place, And Person] : oriented to person, place, and time

## 2024-03-19 NOTE — REASON FOR VISIT
[FreeTextEntry2] : 03/19/2024 Euflexxa 2 03/12/2024 here for L knee pain, no relief with CSI January. She is doing PT, currently in [facility], s/p fall months ago. Exercises and leg machines aggravate. She is supposed to use a walker, but, is able to walk unassisted. No locking or clicking. She is requesting to repeat visco series. 01/12/2024 requesting CSI left knee 8/17/23- Euflexxa 7/27/23: Follow up LEft knee. CSI at last visit was helpful for 2 wks.

## 2024-03-19 NOTE — ASSESSMENT
[FreeTextEntry1] : Patient with ibs and spastic colitis as well as gerd high fiber diet  linzess as directed  proton pump inhibitor therapy

## 2024-03-26 ENCOUNTER — APPOINTMENT (OUTPATIENT)
Dept: ORTHOPEDIC SURGERY | Facility: CLINIC | Age: 84
End: 2024-03-26
Payer: MEDICARE

## 2024-03-26 PROCEDURE — 20610 DRAIN/INJ JOINT/BURSA W/O US: CPT | Mod: LT

## 2024-03-26 PROCEDURE — 99212 OFFICE O/P EST SF 10 MIN: CPT | Mod: 25

## 2024-03-26 NOTE — REASON FOR VISIT
[FreeTextEntry2] : 03/26/2024 Euflexxa 3 03/19/2024 Euflexxa 2 03/12/2024 here for L knee pain, no relief with CSI January. She is doing PT, currently in [facility], s/p fall months ago. Exercises and leg machines aggravate. She is supposed to use a walker, but, is able to walk unassisted. No locking or clicking. She is requesting to repeat visco series. 01/12/2024 requesting CSI left knee 8/17/23- Euflexxa 7/27/23: Follow up LEft knee. CSI at last visit was helpful for 2 wks.

## 2024-03-26 NOTE — PROCEDURE
[Large Joint Injection] : Large joint injection [Left] : of the left [Knee] : knee [Pain] : pain [Alcohol] : alcohol [Betadine] : betadine [Ethyl Chloride sprayed topically] : ethyl chloride sprayed topically [Sterile technique used] : sterile technique used [Euflexxa(20mg)] : 20mg of Euflexxa [#3] : series #3 [] : Patient tolerated procedure well [Apply ice for 15min out of every hour for the next 12-24 hours as tolerated] : apply ice for 15 minutes out of every hour for the next 12-24 hours as tolerated [Call if redness, pain or fever occur] : call if redness, pain or fever occur [Patient was advised to rest the joint(s) for ____ days] : patient was advised to rest the joint(s) for [unfilled] days [Previous OTC use and PT nontherapeutic] : patient has tried OTC's including aspirin, Ibuprofen, Aleve, etc or prescription NSAIDS, and/or exercises at home and/or physical therapy without satisfactory response [Patient had decreased mobility in the joint] : patient had decreased mobility in the joint [Risks, benefits, alternatives discussed / Verbal consent obtained] : the risks benefits, and alternatives have been discussed, and verbal consent was obtained

## 2024-03-26 NOTE — PHYSICAL EXAM
[Left] : left knee [NL (0)] : extension 0 degrees [5___] : hamstring 5[unfilled]/5 [] : negative Lachmann [Equivocal] : equivocal Kevan [TWNoteComboBox7] : flexion 120 degrees

## 2024-03-26 NOTE — HISTORY OF PRESENT ILLNESS
[de-identified] : 8-17-23- for continued euflexxa inj left knee #3  The patient is a 83 year old R hand dominant F who presents today complaining of left knee pain.  Pt had Viscosupplementation and they did not help Date of Injury/Onset:  gradual Pain:    At Rest: 5/10  With Activity:  5/10  Mechanism of injury: gradual over time  This is not a Work Related Injury being treated under Worker's Compensation. This is not an athletic injury occurring associated with an interscholastic or organized sports team. Quality of symptoms: burning shooting stabbing  Improves with: rest  Worse with: activity  Prior treatment: euflexxa inj Prior Imaging: xrays  School/Sport/Position/Occupation: Retired Additional Information: None

## 2024-03-28 RX ORDER — LACTULOSE 10 G/15ML
20 SOLUTION ORAL DAILY
Qty: 473 | Refills: 2 | Status: ACTIVE | COMMUNITY
Start: 2024-03-28 | End: 1900-01-01

## 2024-04-01 RX ORDER — LUBIPROSTONE 8 UG/1
8 CAPSULE ORAL
Qty: 60 | Refills: 3 | Status: ACTIVE | COMMUNITY
Start: 2024-04-01 | End: 1900-01-01

## 2024-04-25 ENCOUNTER — RESULT CHARGE (OUTPATIENT)
Age: 84
End: 2024-04-25

## 2024-04-26 ENCOUNTER — APPOINTMENT (OUTPATIENT)
Dept: ORTHOPEDIC SURGERY | Facility: CLINIC | Age: 84
End: 2024-04-26
Payer: MEDICARE

## 2024-04-26 VITALS — WEIGHT: 120 LBS | BODY MASS INDEX: 22.66 KG/M2 | HEIGHT: 61 IN

## 2024-04-26 PROCEDURE — 99213 OFFICE O/P EST LOW 20 MIN: CPT | Mod: 25

## 2024-04-26 PROCEDURE — 20610 DRAIN/INJ JOINT/BURSA W/O US: CPT | Mod: LT

## 2024-04-26 NOTE — REASON FOR VISIT
[FreeTextEntry2] : 04/26/2024 Didn't get the most benefit from viscoinjection series 03/26/2024 Euflexxa 3 03/19/2024 Euflexxa 2 03/12/2024 here for L knee pain, no relief with CSI January. She is doing PT, currently in [facility], s/p fall months ago. Exercises and leg machines aggravate. She is supposed to use a walker, but, is able to walk unassisted. No locking or clicking. She is requesting to repeat visco series. 01/12/2024 requesting CSI left knee 8/17/23- Euflexxa 7/27/23: Follow up LEft knee. CSI at last visit was helpful for 2 wks.

## 2024-04-26 NOTE — HISTORY OF PRESENT ILLNESS
[de-identified] : 8-17-23- for continued euflexxa inj left knee #3  The patient is a 83 year old R hand dominant F who presents today complaining of left knee pain.  Pt had Viscosupplementation and they did not help Date of Injury/Onset:  gradual Pain:    At Rest: 5/10  With Activity:  5/10  Mechanism of injury: gradual over time  This is not a Work Related Injury being treated under Worker's Compensation. This is not an athletic injury occurring associated with an interscholastic or organized sports team. Quality of symptoms: burning shooting stabbing  Improves with: rest  Worse with: activity  Prior treatment: euflexxa inj Prior Imaging: xrays  School/Sport/Position/Occupation: Retired Additional Information: None

## 2024-04-26 NOTE — PHYSICAL EXAM
[Left] : left knee [NL (0)] : extension 0 degrees [5___] : hamstring 5[unfilled]/5 [Equivocal] : equivocal Kevan [] : negative Lachmann [TWNoteComboBox7] : flexion 120 degrees

## 2024-04-26 NOTE — PROCEDURE
[Large Joint Injection] : Large joint injection [Left] : of the left [Knee] : knee [Pain] : pain [Alcohol] : alcohol [Betadine] : betadine [Ethyl Chloride sprayed topically] : ethyl chloride sprayed topically [Sterile technique used] : sterile technique used [___ cc    3mg] :  Betamethasone (Celestone) ~Vcc of 3mg [___ cc    1%] : Lidocaine ~Vcc of 1%

## 2024-06-15 ENCOUNTER — APPOINTMENT (OUTPATIENT)
Dept: GASTROENTEROLOGY | Facility: CLINIC | Age: 84
End: 2024-06-15
Payer: MEDICARE

## 2024-06-15 VITALS
SYSTOLIC BLOOD PRESSURE: 121 MMHG | BODY MASS INDEX: 22.3 KG/M2 | DIASTOLIC BLOOD PRESSURE: 65 MMHG | WEIGHT: 118 LBS | HEART RATE: 86 BPM | OXYGEN SATURATION: 98 %

## 2024-06-15 DIAGNOSIS — K58.1 IRRITABLE BOWEL SYNDROME WITH CONSTIPATION: ICD-10-CM

## 2024-06-15 DIAGNOSIS — K20.90 ESOPHAGITIS, UNSPECIFIED WITHOUT BLEEDING: ICD-10-CM

## 2024-06-15 PROCEDURE — 99214 OFFICE O/P EST MOD 30 MIN: CPT

## 2024-06-15 RX ORDER — LACTULOSE 10 G/15ML
20 SOLUTION ORAL
Qty: 473 | Refills: 1 | Status: ACTIVE | COMMUNITY
Start: 2024-06-15 | End: 1900-01-01

## 2024-06-15 RX ORDER — FAMOTIDINE 40 MG/1
40 TABLET, FILM COATED ORAL
Qty: 90 | Refills: 3 | Status: ACTIVE | COMMUNITY
Start: 2024-06-15 | End: 1900-01-01

## 2024-06-15 NOTE — ASSESSMENT
[FreeTextEntry1] : Patient with acute exacerbation of spastic colitis and flare of esophagitis.  Two new presciptions sent to pharmacy   medications renewed and reconciled

## 2024-06-15 NOTE — HISTORY OF PRESENT ILLNESS
[FreeTextEntry1] : Chief complaint: abdominal apin   HPI: Patient presents for acute exacerbation of gerd and spastic colitis  Intermittent crampy rlq and llq pain. there are no exacerbating or ameliorating factors   Patient also with flare of gerd with esophagitis and indigestion   Patient with flare of spastic colitis as well   two new prescriptions sent tor the patient, lactulose and famotidine  moderate degree of complexity of medical decision making involved in this patient encounter  outside records from patient's recent hospitalization were extensively reviewed by me

## 2024-06-15 NOTE — PHYSICAL EXAM
[Alert] : alert [Normal Voice/Communication] : normal voice/communication [Healthy Appearing] : healthy appearing [No Acute Distress] : no acute distress [Sclera] : the sclera and conjunctiva were normal [Hearing Threshold Finger Rub Not [place]] : hearing was normal [Normal Lips/Gums] : the lips and gums were normal [Oropharynx] : the oropharynx was normal [Normal Appearance] : the appearance of the neck was normal [No Neck Mass] : no neck mass was observed [No Respiratory Distress] : no respiratory distress [No Acc Muscle Use] : no accessory muscle use [Respiration, Rhythm And Depth] : normal respiratory rhythm and effort [Auscultation Breath Sounds / Voice Sounds] : lungs were clear to auscultation bilaterally [Heart Rate And Rhythm] : heart rate was normal and rhythm regular [Normal S1, S2] : normal S1 and S2 [Murmurs] : no murmurs [Bowel Sounds] : normal bowel sounds [Abdomen Tenderness] : non-tender [No Masses] : no abdominal mass palpated [Abdomen Soft] : soft [] : no hepatosplenomegaly [Oriented To Time, Place, And Person] : oriented to person, place, and time

## 2024-06-20 ENCOUNTER — APPOINTMENT (OUTPATIENT)
Dept: GASTROENTEROLOGY | Facility: CLINIC | Age: 84
End: 2024-06-20

## 2024-06-24 ENCOUNTER — APPOINTMENT (OUTPATIENT)
Dept: ORTHOPEDIC SURGERY | Facility: CLINIC | Age: 84
End: 2024-06-24
Payer: MEDICARE

## 2024-06-24 VITALS — HEIGHT: 61 IN | BODY MASS INDEX: 22.28 KG/M2 | WEIGHT: 118 LBS

## 2024-06-24 DIAGNOSIS — M17.12 UNILATERAL PRIMARY OSTEOARTHRITIS, LEFT KNEE: ICD-10-CM

## 2024-06-24 PROCEDURE — 99213 OFFICE O/P EST LOW 20 MIN: CPT

## 2024-06-25 ENCOUNTER — APPOINTMENT (OUTPATIENT)
Dept: MRI IMAGING | Facility: CLINIC | Age: 84
End: 2024-06-25

## 2024-07-01 ENCOUNTER — APPOINTMENT (OUTPATIENT)
Dept: MRI IMAGING | Facility: CLINIC | Age: 84
End: 2024-07-01
Payer: MEDICARE

## 2024-07-01 PROCEDURE — 73721 MRI JNT OF LWR EXTRE W/O DYE: CPT | Mod: LT

## 2024-07-09 ENCOUNTER — APPOINTMENT (OUTPATIENT)
Dept: ORTHOPEDIC SURGERY | Facility: CLINIC | Age: 84
End: 2024-07-09
Payer: MEDICARE

## 2024-07-09 VITALS — WEIGHT: 118 LBS | BODY MASS INDEX: 22.28 KG/M2 | HEIGHT: 61 IN

## 2024-07-09 DIAGNOSIS — M17.12 UNILATERAL PRIMARY OSTEOARTHRITIS, LEFT KNEE: ICD-10-CM

## 2024-07-09 PROCEDURE — 99213 OFFICE O/P EST LOW 20 MIN: CPT

## 2024-07-10 ENCOUNTER — APPOINTMENT (OUTPATIENT)
Dept: ORTHOPEDIC SURGERY | Facility: CLINIC | Age: 84
End: 2024-07-10
Payer: MEDICARE

## 2024-07-10 VITALS — HEART RATE: 70 BPM | SYSTOLIC BLOOD PRESSURE: 126 MMHG | DIASTOLIC BLOOD PRESSURE: 72 MMHG

## 2024-07-10 DIAGNOSIS — M48.062 SPINAL STENOSIS, LUMBAR REGION WITH NEUROGENIC CLAUDICATION: ICD-10-CM

## 2024-07-10 DIAGNOSIS — Z98.1 ARTHRODESIS STATUS: ICD-10-CM

## 2024-07-10 PROCEDURE — 99214 OFFICE O/P EST MOD 30 MIN: CPT

## 2024-07-12 ENCOUNTER — APPOINTMENT (OUTPATIENT)
Dept: GASTROENTEROLOGY | Facility: CLINIC | Age: 84
End: 2024-07-12
Payer: MEDICARE

## 2024-07-12 VITALS
BODY MASS INDEX: 22.09 KG/M2 | RESPIRATION RATE: 15 BRPM | DIASTOLIC BLOOD PRESSURE: 79 MMHG | HEART RATE: 66 BPM | SYSTOLIC BLOOD PRESSURE: 136 MMHG | OXYGEN SATURATION: 98 % | WEIGHT: 117 LBS | HEIGHT: 61 IN

## 2024-07-12 DIAGNOSIS — K58.9 IRRITABLE BOWEL SYNDROME W/OUT DIARRHEA: ICD-10-CM

## 2024-07-12 DIAGNOSIS — R19.4 CHANGE IN BOWEL HABIT: ICD-10-CM

## 2024-07-12 DIAGNOSIS — N20.1 CALCULUS OF URETER: ICD-10-CM

## 2024-07-12 PROCEDURE — 99214 OFFICE O/P EST MOD 30 MIN: CPT

## 2024-07-12 RX ORDER — DOCUSATE SODIUM 100 MG/1
100 CAPSULE ORAL 3 TIMES DAILY
Qty: 90 | Refills: 0 | Status: ACTIVE | COMMUNITY
Start: 2024-07-12 | End: 1900-01-01

## 2024-07-13 ENCOUNTER — APPOINTMENT (OUTPATIENT)
Dept: CT IMAGING | Facility: CLINIC | Age: 84
End: 2024-07-13

## 2024-07-13 DIAGNOSIS — N20.1 CALCULUS OF URETER: ICD-10-CM

## 2024-07-13 PROCEDURE — 74176 CT ABD & PELVIS W/O CONTRAST: CPT

## 2024-07-30 ENCOUNTER — APPOINTMENT (OUTPATIENT)
Dept: ORTHOPEDIC SURGERY | Facility: CLINIC | Age: 84
End: 2024-07-30

## 2024-07-30 VITALS — WEIGHT: 117 LBS | HEIGHT: 61 IN | BODY MASS INDEX: 22.09 KG/M2

## 2024-07-30 DIAGNOSIS — M25.562 PAIN IN LEFT KNEE: ICD-10-CM

## 2024-07-30 DIAGNOSIS — M17.12 UNILATERAL PRIMARY OSTEOARTHRITIS, LEFT KNEE: ICD-10-CM

## 2024-07-30 DIAGNOSIS — G89.29 PAIN IN LEFT KNEE: ICD-10-CM

## 2024-07-30 PROCEDURE — 20610 DRAIN/INJ JOINT/BURSA W/O US: CPT | Mod: LT

## 2024-07-30 PROCEDURE — 99213 OFFICE O/P EST LOW 20 MIN: CPT | Mod: 25

## 2024-07-30 NOTE — HISTORY OF PRESENT ILLNESS
[de-identified] : 07/30/24:  f/u continued pain left knee, that is more constant now.  Wakes her qhs.  It hurts to walk. She also reports continued LBP. She is scheduled for lumbar ablation 8/9/24.   Tylenol without relief. Completed course of Euflexxa end of March.  07/09/2024 had MRI: meniscal tears with arthritis, no insufficiency fractures 06/24/2024 Having constant pain left knee 04/26/2024 Didn't get the most benefit from viscoinjection series 03/26/2024 Euflexxa 3 03/19/2024 Euflexxa 2 03/12/2024 here for L knee pain, no relief with CSI January. She is doing PT, currently in [facility], s/p fall months ago. Exercises and leg machines aggravate. She is supposed to use a walker, but, is able to walk unassisted. No locking or clicking. She is requesting to repeat visco series. 01/12/2024 requesting CSI left knee 8/17/23-  for continued euflexxa inj left knee #3 7/27/23: Follow up LEft knee. CSI at last visit was helpful for 2 wks.  The patient is a 83 year old R hand dominant F who presents today complaining of left knee pain.  Pt had Viscosupplementation and they did not help Date of Injury/Onset:  gradual Pain:    At Rest: 5/10  With Activity:  5/10  Mechanism of injury: gradual over time  This is not a Work Related Injury being treated under Worker's Compensation. This is not an athletic injury occurring associated with an interscholastic or organized sports team. Quality of symptoms: burning shooting stabbing  Improves with: rest  Worse with: activity  Prior treatment: euflexxa inj Prior Imaging: xrays  School/Sport/Position/Occupation: Retired Additional Information: None

## 2024-07-30 NOTE — PROCEDURE
[Large Joint Injection] : Large joint injection [Left] : of the left [Knee] : knee [Pain] : pain [Inflammation] : inflammation [X-ray evidence of Osteoarthritis on this or prior visit] : x-ray evidence of Osteoarthritis on this or prior visit [Alcohol] : alcohol [Betadine] : betadine [Ethyl Chloride sprayed topically] : ethyl chloride sprayed topically [Sterile technique used] : sterile technique used [___ cc    32 units 5mg] : Zilretta ~Vcc of 32 units 5 mg  [] : Patient tolerated procedure well [Call if redness, pain or fever occur] : call if redness, pain or fever occur [Apply ice for 15min out of every hour for the next 12-24 hours as tolerated] : apply ice for 15 minutes out of every hour for the next 12-24 hours as tolerated

## 2024-07-30 NOTE — ASSESSMENT
[FreeTextEntry1] : flare up L knee OA MRI reviewed, not much else to do at this point, not a surgical candidate Will try Zilretta today, post injection instructions May repeat Euflexxa 9/27/24 Lido patches

## 2024-08-03 ENCOUNTER — TRANSCRIPTION ENCOUNTER (OUTPATIENT)
Age: 84
End: 2024-08-03

## 2024-08-05 ENCOUNTER — APPOINTMENT (OUTPATIENT)
Dept: GASTROENTEROLOGY | Facility: CLINIC | Age: 84
End: 2024-08-05

## 2024-08-05 PROBLEM — K57.32 DIVERTICULITIS, COLON: Status: ACTIVE | Noted: 2024-08-05

## 2024-08-05 PROCEDURE — 99213 OFFICE O/P EST LOW 20 MIN: CPT

## 2024-08-06 NOTE — ASSESSMENT
[FreeTextEntry1] : Patient presents with acute colonic diverticulitis   low residue diet   new prescription sent for antibiotics to pharmacy   new test ordered, colonoscopy

## 2024-08-15 ENCOUNTER — APPOINTMENT (OUTPATIENT)
Dept: GASTROENTEROLOGY | Facility: CLINIC | Age: 84
End: 2024-08-15
Payer: MEDICARE

## 2024-08-15 VITALS
HEART RATE: 75 BPM | WEIGHT: 116 LBS | BODY MASS INDEX: 21.9 KG/M2 | OXYGEN SATURATION: 97 % | HEIGHT: 61 IN | DIASTOLIC BLOOD PRESSURE: 83 MMHG | SYSTOLIC BLOOD PRESSURE: 149 MMHG

## 2024-08-15 DIAGNOSIS — K57.32 DIVERTICULITIS OF LARGE INTESTINE W/OUT PERFORATION OR ABSCESS W/OUT BLEEDING: ICD-10-CM

## 2024-08-15 DIAGNOSIS — R19.4 CHANGE IN BOWEL HABIT: ICD-10-CM

## 2024-08-15 PROCEDURE — 99214 OFFICE O/P EST MOD 30 MIN: CPT

## 2024-08-15 RX ORDER — LINACLOTIDE 145 UG/1
145 CAPSULE, GELATIN COATED ORAL
Qty: 30 | Refills: 3 | Status: ACTIVE | COMMUNITY
Start: 2024-08-15 | End: 1900-01-01

## 2024-08-15 NOTE — ASSESSMENT
[FreeTextEntry1] : Patient with acute exacerbation of spastic colitis and acute exacerbation of peptic gastritis   low fodmpa diet reviewed with patient   new prescription sent to pharmacy for linzess  lab work ordered for patient including cbc, cmp, asca  I reviewed cat scan report with patient   moderate degree of complexity of medical decision making involved in this patient encounter

## 2024-08-15 NOTE — PHYSICAL EXAM

## 2024-08-15 NOTE — HISTORY OF PRESENT ILLNESS
[FreeTextEntry1] : Chief complaint: spastic colitis exacerbation, flare of peptic gastritis   HPI: Patient presents for gastroenterology followup because of multiple complex gastrointestinal and medical signs and symptoms. P with intermittent crampy rlq and llq abdominal pain. There are no exacerbating or ameliorating factors. The abdominal pain is non radiating; associated factors include nausea  No sign of acute gi bleeding; no hematemesis, melena or hematochezia   New medication sent to pharmacy linzess  I extensively reviewed patient's medical history and prior history including previous blooddwork, medical imaging and prior physical exam reports   Moderate degree of complexity of medical decision making involved in this patient encounter

## 2024-08-26 ENCOUNTER — APPOINTMENT (OUTPATIENT)
Dept: GASTROENTEROLOGY | Facility: HOSPITAL | Age: 84
End: 2024-08-26

## 2024-09-03 DIAGNOSIS — R19.4 CHANGE IN BOWEL HABIT: ICD-10-CM

## 2024-09-03 RX ORDER — POLYETHYLENE GLYCOL 3350 17 G/17G
17 POWDER, FOR SOLUTION ORAL
Qty: 14 | Refills: 0 | Status: ACTIVE | COMMUNITY
Start: 2024-09-03 | End: 1900-01-01

## 2024-09-06 ENCOUNTER — APPOINTMENT (OUTPATIENT)
Dept: GASTROENTEROLOGY | Facility: HOSPITAL | Age: 84
End: 2024-09-06

## 2024-09-13 ENCOUNTER — APPOINTMENT (OUTPATIENT)
Dept: GASTROENTEROLOGY | Facility: CLINIC | Age: 84
End: 2024-09-13
Payer: MEDICARE

## 2024-09-13 VITALS
BODY MASS INDEX: 22.28 KG/M2 | SYSTOLIC BLOOD PRESSURE: 119 MMHG | WEIGHT: 118 LBS | HEIGHT: 61 IN | DIASTOLIC BLOOD PRESSURE: 72 MMHG | OXYGEN SATURATION: 100 % | HEART RATE: 76 BPM

## 2024-09-13 DIAGNOSIS — K57.90 DIVERTICULOSIS OF INTESTINE, PART UNSPECIFIED, W/OUT PERFORATION OR ABSCESS W/OUT BLEEDING: ICD-10-CM

## 2024-09-13 DIAGNOSIS — R19.4 CHANGE IN BOWEL HABIT: ICD-10-CM

## 2024-09-13 DIAGNOSIS — K58.9 IRRITABLE BOWEL SYNDROME W/OUT DIARRHEA: ICD-10-CM

## 2024-09-13 PROCEDURE — 99214 OFFICE O/P EST MOD 30 MIN: CPT

## 2024-09-14 NOTE — HISTORY OF PRESENT ILLNESS
[FreeTextEntry1] : Chief complaint: abdominal pain   HPI: Patient presents for evaluation and management of multiple complex Gastrointestinal signs and symptoms. Has intermittent crampy rlq abdominal pain. There are no exacerbating or ameliorating factors. The abdominal pain i non radiating. Associated factors include nausea.   There are no signs of acute gastrointestinal bleeding; no hematemesis, melena or hematochezia. There is no dysphagia or odynophagia.   New prescription sent for the patient linzess New test ordered for the patient colonoscopy  New procedure ordered for the patient upper endoscopy  All questions answered to the patient's satisfaction

## 2024-09-14 NOTE — ASSESSMENT
[FreeTextEntry1] : Patient presents for evaluation and management of multiple complex Gastrointestinal signs and symptoms   Has acute exacerbation of idiopathic colitis   New test ordered for patient colonoscopy   New diagnostic test ordered for patient, upper endoscopy   New prescription sent for patient, Linzess   Medications reviewed and reconciled

## 2024-09-15 ENCOUNTER — EMERGENCY (EMERGENCY)
Facility: HOSPITAL | Age: 84
LOS: 1 days | Discharge: HOME | End: 2024-09-15
Attending: EMERGENCY MEDICINE | Admitting: EMERGENCY MEDICINE
Payer: MEDICARE

## 2024-09-15 VITALS
SYSTOLIC BLOOD PRESSURE: 145 MMHG | OXYGEN SATURATION: 96 % | RESPIRATION RATE: 18 BRPM | TEMPERATURE: 98 F | DIASTOLIC BLOOD PRESSURE: 86 MMHG | HEART RATE: 84 BPM

## 2024-09-15 VITALS
OXYGEN SATURATION: 96 % | TEMPERATURE: 98 F | HEIGHT: 61 IN | RESPIRATION RATE: 16 BRPM | WEIGHT: 117.95 LBS | SYSTOLIC BLOOD PRESSURE: 146 MMHG | DIASTOLIC BLOOD PRESSURE: 77 MMHG | HEART RATE: 54 BPM

## 2024-09-15 DIAGNOSIS — Z98.82 BREAST IMPLANT STATUS: Chronic | ICD-10-CM

## 2024-09-15 DIAGNOSIS — Z98.89 OTHER SPECIFIED POSTPROCEDURAL STATES: Chronic | ICD-10-CM

## 2024-09-15 DIAGNOSIS — Z90.710 ACQUIRED ABSENCE OF BOTH CERVIX AND UTERUS: Chronic | ICD-10-CM

## 2024-09-15 DIAGNOSIS — Z98.890 OTHER SPECIFIED POSTPROCEDURAL STATES: Chronic | ICD-10-CM

## 2024-09-15 DIAGNOSIS — Z90.49 ACQUIRED ABSENCE OF OTHER SPECIFIED PARTS OF DIGESTIVE TRACT: Chronic | ICD-10-CM

## 2024-09-15 LAB
ALBUMIN SERPL ELPH-MCNC: 3.7 G/DL — SIGNIFICANT CHANGE UP (ref 3.3–5)
ALP SERPL-CCNC: 131 U/L — HIGH (ref 40–120)
ALT FLD-CCNC: 23 U/L — SIGNIFICANT CHANGE UP (ref 12–78)
ANION GAP SERPL CALC-SCNC: 10 MMOL/L — SIGNIFICANT CHANGE UP (ref 5–17)
APPEARANCE UR: ABNORMAL
APTT BLD: 29.8 SEC — SIGNIFICANT CHANGE UP (ref 24.5–35.6)
AST SERPL-CCNC: 18 U/L — SIGNIFICANT CHANGE UP (ref 15–37)
BASOPHILS # BLD AUTO: 0.04 K/UL — SIGNIFICANT CHANGE UP (ref 0–0.2)
BASOPHILS NFR BLD AUTO: 0.4 % — SIGNIFICANT CHANGE UP (ref 0–2)
BILIRUB SERPL-MCNC: 0.4 MG/DL — SIGNIFICANT CHANGE UP (ref 0.2–1.2)
BILIRUB UR-MCNC: NEGATIVE — SIGNIFICANT CHANGE UP
BUN SERPL-MCNC: 19 MG/DL — SIGNIFICANT CHANGE UP (ref 7–23)
CALCIUM SERPL-MCNC: 9 MG/DL — SIGNIFICANT CHANGE UP (ref 8.5–10.1)
CHLORIDE SERPL-SCNC: 104 MMOL/L — SIGNIFICANT CHANGE UP (ref 96–108)
CO2 SERPL-SCNC: 25 MMOL/L — SIGNIFICANT CHANGE UP (ref 22–31)
COLOR SPEC: YELLOW — SIGNIFICANT CHANGE UP
CREAT SERPL-MCNC: 0.78 MG/DL — SIGNIFICANT CHANGE UP (ref 0.5–1.3)
DIFF PNL FLD: ABNORMAL
EGFR: 74 ML/MIN/1.73M2 — SIGNIFICANT CHANGE UP
EOSINOPHIL # BLD AUTO: 0.09 K/UL — SIGNIFICANT CHANGE UP (ref 0–0.5)
EOSINOPHIL NFR BLD AUTO: 0.9 % — SIGNIFICANT CHANGE UP (ref 0–6)
GLUCOSE SERPL-MCNC: 147 MG/DL — HIGH (ref 70–99)
GLUCOSE UR QL: NEGATIVE MG/DL — SIGNIFICANT CHANGE UP
HCT VFR BLD CALC: 39.7 % — SIGNIFICANT CHANGE UP (ref 34.5–45)
HGB BLD-MCNC: 12.2 G/DL — SIGNIFICANT CHANGE UP (ref 11.5–15.5)
IMM GRANULOCYTES NFR BLD AUTO: 0.4 % — SIGNIFICANT CHANGE UP (ref 0–0.9)
INR BLD: 1.05 RATIO — SIGNIFICANT CHANGE UP (ref 0.85–1.18)
KETONES UR-MCNC: NEGATIVE MG/DL — SIGNIFICANT CHANGE UP
LACTATE SERPL-SCNC: 0.9 MMOL/L — SIGNIFICANT CHANGE UP (ref 0.7–2)
LEUKOCYTE ESTERASE UR-ACNC: ABNORMAL
LIDOCAIN IGE QN: 36 U/L — SIGNIFICANT CHANGE UP (ref 13–75)
LYMPHOCYTES # BLD AUTO: 1.01 K/UL — SIGNIFICANT CHANGE UP (ref 1–3.3)
LYMPHOCYTES # BLD AUTO: 10.5 % — LOW (ref 13–44)
MCHC RBC-ENTMCNC: 25.7 PG — LOW (ref 27–34)
MCHC RBC-ENTMCNC: 30.7 GM/DL — LOW (ref 32–36)
MCV RBC AUTO: 83.8 FL — SIGNIFICANT CHANGE UP (ref 80–100)
MONOCYTES # BLD AUTO: 1.01 K/UL — HIGH (ref 0–0.9)
MONOCYTES NFR BLD AUTO: 10.5 % — SIGNIFICANT CHANGE UP (ref 2–14)
NEUTROPHILS # BLD AUTO: 7.44 K/UL — HIGH (ref 1.8–7.4)
NEUTROPHILS NFR BLD AUTO: 77.3 % — HIGH (ref 43–77)
NITRITE UR-MCNC: POSITIVE
NRBC # BLD: 0 /100 WBCS — SIGNIFICANT CHANGE UP (ref 0–0)
PH UR: 6.5 — SIGNIFICANT CHANGE UP (ref 5–8)
PLATELET # BLD AUTO: 242 K/UL — SIGNIFICANT CHANGE UP (ref 150–400)
POTASSIUM SERPL-MCNC: 3.7 MMOL/L — SIGNIFICANT CHANGE UP (ref 3.5–5.3)
POTASSIUM SERPL-SCNC: 3.7 MMOL/L — SIGNIFICANT CHANGE UP (ref 3.5–5.3)
PROT SERPL-MCNC: 7.6 G/DL — SIGNIFICANT CHANGE UP (ref 6–8.3)
PROT UR-MCNC: 30 MG/DL
PROTHROM AB SERPL-ACNC: 12 SEC — SIGNIFICANT CHANGE UP (ref 9.5–13)
RBC # BLD: 4.74 M/UL — SIGNIFICANT CHANGE UP (ref 3.8–5.2)
RBC # FLD: 15.5 % — HIGH (ref 10.3–14.5)
SODIUM SERPL-SCNC: 139 MMOL/L — SIGNIFICANT CHANGE UP (ref 135–145)
SP GR SPEC: 1.02 — SIGNIFICANT CHANGE UP (ref 1–1.03)
UROBILINOGEN FLD QL: 1 MG/DL — SIGNIFICANT CHANGE UP (ref 0.2–1)
WBC # BLD: 9.63 K/UL — SIGNIFICANT CHANGE UP (ref 3.8–10.5)
WBC # FLD AUTO: 9.63 K/UL — SIGNIFICANT CHANGE UP (ref 3.8–10.5)

## 2024-09-15 PROCEDURE — 71045 X-RAY EXAM CHEST 1 VIEW: CPT | Mod: 26

## 2024-09-15 PROCEDURE — 85025 COMPLETE CBC W/AUTO DIFF WBC: CPT

## 2024-09-15 PROCEDURE — 99284 EMERGENCY DEPT VISIT MOD MDM: CPT | Mod: FS

## 2024-09-15 PROCEDURE — 93010 ELECTROCARDIOGRAM REPORT: CPT

## 2024-09-15 PROCEDURE — 93005 ELECTROCARDIOGRAM TRACING: CPT

## 2024-09-15 PROCEDURE — 80053 COMPREHEN METABOLIC PANEL: CPT

## 2024-09-15 PROCEDURE — 85730 THROMBOPLASTIN TIME PARTIAL: CPT

## 2024-09-15 PROCEDURE — 87186 SC STD MICRODIL/AGAR DIL: CPT

## 2024-09-15 PROCEDURE — 96374 THER/PROPH/DIAG INJ IV PUSH: CPT

## 2024-09-15 PROCEDURE — 36415 COLL VENOUS BLD VENIPUNCTURE: CPT

## 2024-09-15 PROCEDURE — 87086 URINE CULTURE/COLONY COUNT: CPT

## 2024-09-15 PROCEDURE — 74176 CT ABD & PELVIS W/O CONTRAST: CPT | Mod: 26,MC

## 2024-09-15 PROCEDURE — 99285 EMERGENCY DEPT VISIT HI MDM: CPT | Mod: 25

## 2024-09-15 PROCEDURE — 85610 PROTHROMBIN TIME: CPT

## 2024-09-15 PROCEDURE — 74176 CT ABD & PELVIS W/O CONTRAST: CPT | Mod: MC

## 2024-09-15 PROCEDURE — 83690 ASSAY OF LIPASE: CPT

## 2024-09-15 PROCEDURE — 83605 ASSAY OF LACTIC ACID: CPT

## 2024-09-15 PROCEDURE — 81001 URINALYSIS AUTO W/SCOPE: CPT

## 2024-09-15 PROCEDURE — 71045 X-RAY EXAM CHEST 1 VIEW: CPT

## 2024-09-15 RX ORDER — SODIUM CHLORIDE 9 MG/ML
1000 INJECTION INTRAMUSCULAR; INTRAVENOUS; SUBCUTANEOUS ONCE
Refills: 0 | Status: COMPLETED | OUTPATIENT
Start: 2024-09-15 | End: 2024-09-15

## 2024-09-15 RX ORDER — CEFUROXIME SODIUM 1.5 G
1 VIAL (EA) INJECTION
Qty: 14 | Refills: 0
Start: 2024-09-15 | End: 2024-09-21

## 2024-09-15 RX ORDER — ACETAMINOPHEN 325 MG/1
1000 TABLET ORAL ONCE
Refills: 0 | Status: COMPLETED | OUTPATIENT
Start: 2024-09-15 | End: 2024-09-15

## 2024-09-15 RX ORDER — ONDANSETRON 2 MG/ML
4 INJECTION, SOLUTION INTRAMUSCULAR; INTRAVENOUS ONCE
Refills: 0 | Status: COMPLETED | OUTPATIENT
Start: 2024-09-15 | End: 2024-09-15

## 2024-09-15 RX ORDER — CEFUROXIME SODIUM 1.5 G
500 VIAL (EA) INJECTION ONCE
Refills: 0 | Status: COMPLETED | OUTPATIENT
Start: 2024-09-15 | End: 2024-09-15

## 2024-09-15 RX ADMIN — SODIUM CHLORIDE 1000 MILLILITER(S): 9 INJECTION INTRAMUSCULAR; INTRAVENOUS; SUBCUTANEOUS at 19:33

## 2024-09-15 RX ADMIN — Medication 500 MILLIGRAM(S): at 21:38

## 2024-09-15 RX ADMIN — ACETAMINOPHEN 400 MILLIGRAM(S): 325 TABLET ORAL at 19:32

## 2024-09-15 NOTE — ED ADULT TRIAGE NOTE - CHIEF COMPLAINT QUOTE
c/o abdominal pain* 1week- upper abdomen distended- constipated *4 days- patient medication switched - vomited coffee ground *1 , patient on aspirin- as per ems patient also sent in for altered mental status *2 days

## 2024-09-15 NOTE — ED ADULT NURSE NOTE - AS PAIN REST
Physical Therapy    Facility/Department: [facility] MED SURG UNIT  Initial Assessment    NAME: Yolis Yadav  : 1930  MRN: [id number]    Date of Service: 2019    Discharge Recommendations:  Continue to assess pending progress, Patient would benefit from continued therapy after discharge        Assessment   Body structures, Functions, Activity limitations: Decreased functional mobility ; Decreased strength;Decreased endurance;Decreased safe awareness;Decreased balance  Assessment: Pt presents with recent increase in weakness, LE swelling, and shortness of breath. Pt demo's weakness with transfers, instability with standing and overall dec aboility to perofrm functional mobility including standing and stairs required to enter home. Pt with skilled PT needs to address strength, balance, gait and stair navigation improving functional mobility and returning to max/PLOF. Treatment Diagnosis: Weakness, SOB, LE edema, hx of falls  Prognosis: Good  Patient Education: HEP AP,qset, glute set, heel slides  REQUIRES PT FOLLOW UP: Yes  Activity Tolerance  Activity Tolerance: Patient Tolerated treatment well       Patient Diagnosis(es): The primary encounter diagnosis was Peripheral edema. Diagnoses of Congestive heart failure, unspecified HF chronicity, unspecified heart failure type ([address] Chronic atrial fibrillation ([address] Chronic anemia, and DNR (do not resuscitate) were also pertinent to this visit. has a past medical history of Anxiety disorder, Arthritis, Carbajal's esophagus, Cataract, DVT (deep venous thrombosis) ([address] GERD (gastroesophageal reflux disease), HTN (hypertension), Prostate enlargement, Pulmonary embolism ([address] and Type 2 diabetes mellitus ([address] has a past surgical history that includes back surgery and Inguinal hernia repair (Bilateral).     Restrictions  Restrictions/Precautions  Restrictions/Precautions: Fall Risk, General Precautions  Required Braces or Orthoses?: No  Vision/Hearing  Vision: Objective     Observation/Palpation  Posture: Good  Palpation: denies pain with palpation to lower extremities. Observation: B LE swelling redness on right with contusion mid (R) tibia, IV (L) hand    AROM RLE (degrees)  RLE AROM: WFL  AROM LLE (degrees)  LLE AROM : WFL  Strength RLE  Comment: Tested in supine 3+/5 grossly  Strength LLE  Comment: Tested in supine 3+/5 grossly     Sensation  Overall Sensation Status: WFL  Bed mobility  Supine to Sit: Minimal assistance  Sit to Supine: Minimal assistance  Scooting: Minimal assistance  Transfers  Sit to Stand: Moderate Assistance  Stand to sit: Minimal Assistance  Ambulation  Ambulation?: No  Stairs/Curb  Stairs?: No     Balance  Posture: Fair  Sitting - Static: Good  Sitting - Dynamic: Fair  Standing - Static: Poor  Standing - Dynamic: Poor  Comments: Good sitting balance static has retro and L lateral lean with standing  Exercises  Straight Leg Raise: x10  Quad Sets: x20  Heelslides: x10 ea  Gluteal Sets: x20  Hip Abduction: and adduction in supine x10 ea  Ankle Pumps: x20     Plan   Plan  Times per week: 5-7  Times per day: (1-2x/day)  Plan weeks: 1 week  Current Treatment Recommendations: Strengthening, Transfer Training, Neuromuscular Re-education, ROM, ADL/Self-care Training, Balance Training, Gait Training, Home Exercise Program, Modalities, Safety Education & Training, Stair training, Functional Mobility Training  Safety Devices  Type of devices:  All fall risk precautions in place, Bed alarm in place, Call light within reach    G-Code       OutComes Score            AM-PAC Score   Goals  Short term goals  Time Frame for Short term goals: 3-5 days  Short term goal 1: Pt to be I with transfers for improved safety  Short term goal 2: Pt to be I with bed mobility  Short term goal 3: Pt to demo 4+/5 strength B LE to facilitate improved functional mobility  Short term goal 4: Pt to demo good overall standing balance for decreased risk for falls  Short term 8 (severe pain)

## 2024-09-15 NOTE — ED PROVIDER NOTE - OBJECTIVE STATEMENT
Patient is a 84-year-old female with a history of hypothyroidism hyperlipidemia Alzheimer's seizures hypertension CAD heart failure constipation GERD brought in by EMS from [facility] abdominal pain for about patient states she vomited.  Patient states she has been constipated for the last 4 days.  Patient is on aspirin and plavix  denies any abdominal surgical history

## 2024-09-15 NOTE — ED PROVIDER NOTE - NSFOLLOWUPINSTRUCTIONS_ED_ALL_ED_FT
take antibiotics as directed  return to er for any worsening symptoms     Urinary Tract Infection, Female  The female urinary system, including the kidneys, ureters, bladder, and urethra.  A urinary tract infection (UTI) is an infection in your urinary tract. The urinary tract is made up of organs that make, store, and get rid of pee (urine) in your body. These organs include:  The kidneys.  The ureters.  The bladder.  The urethra.  What are the causes?  Most UTIs are caused by germs called bacteria. They may be in or near your genitals. These germs grow and cause swelling in your urinary tract.    Females are more likely to get a UTI because the urethra is shorter and closer to the butt.    What increases the risk?  You're more likely to get a UTI if:  You have a soft tube called a catheter that drains your pee.  You can't control when you pee or poop.  You have trouble peeing because of:  A kidney stone.  A urinary blockage.  A nerve condition that affects your bladder.  Not getting enough to drink.  You're sexually active.  You use a birth control inside your vagina, like spermicide.  You're pregnant.  You have low levels of the hormone estrogen in your body.  You're an older adult.  You're also more likely to get a UTI if you have other health problems. These may include:  Diabetes.  A weak immune system. Your immune system is your body's defense system.  Sickle cell disease.  Injury of the spine.  What are the signs or symptoms?  Symptoms may include:  Needing to pee right away.  Peeing small amounts often.  Pain or burning when you pee.  Blood in your pee.  Pee that smells bad or odd.  Pain in your belly or lower back.  You may also:  Feel confused. This may be the first symptom in older adults.  Vomit.  Not feel hungry.  Feel tired or easily annoyed.  Have a fever or chills.  How is this diagnosed?  A UTI is diagnosed based on your medical history and an exam. You may also have other tests. These may include:  Pee tests.  Blood tests.  Tests for sexually transmitted infections (STIs).  If you've had more than one UTI, you may need to have imaging studies done to find out why you keep getting them.    How is this treated?  A UTI can be treated by:  Taking antibiotics or other medicines.  Drinking enough fluid to keep your pee pale yellow.  In rare cases, a UTI can cause a very bad condition called sepsis. Sepsis may be treated in the hospital.    Follow these instructions at home:  Medicines    Take your medicines only as told by your health care provider.  If you were given antibiotics, take them as told by your provider. Do not stop taking them even if you start to feel better.  General instructions    Make sure you:  Pee often and fully. Do not hold your pee for a long time.  Wipe from front to back after you pee or poop. Use each tissue only once when you wipe.  Pee after you have sex.  Do not douche or use sprays or powders in your genital area.  Contact a health care provider if:  Your symptoms don't get better after 1–2 days of taking antibiotics.  Your symptoms go away and then come back.  You have a fever or chills.  You vomit or feel like you may vomit.  Get help right away if:  You have very bad pain in your back or lower belly.  You faint.  This information is not intended to replace advice given to you by your health care provider. Make sure you discuss any questions you have with your health care provider.

## 2024-09-15 NOTE — ED PROVIDER NOTE - PROGRESS NOTE DETAILS
No acute findings and blood work positive UTI CAT scan no acute + constipation + femoral hernia findings patient tolerating p.o. no vomiting in the ED positive UTI will treat with antibiotics  Patient friend coming to pick her up confirming the facility

## 2024-09-15 NOTE — ED ADULT NURSE NOTE - OBJECTIVE STATEMENT
Pt BIBA from [facility] c/o coffee ground emesis and abdominal pain. Pt BIBA from [facility] c/o coffee ground emesis and abdominal pain. Pt A&Ox3 - poor historian. Per EMS, Pt has had abdominal pain x1 week and constipation x4 days with an episode of coffee ground emesis today. Pt placed on bedside cardiac monitoring. Pt resting in stretcher.

## 2024-09-15 NOTE — ED ADULT NURSE REASSESSMENT NOTE - NS ED NURSE REASSESS COMMENT FT1
1930 Pt received alert. pt back from CT. Meds admin. NAD noted. Pt appears comfortable. Safety maintained.

## 2024-09-15 NOTE — ED PROVIDER NOTE - CLINICAL SUMMARY MEDICAL DECISION MAKING FREE TEXT BOX
84-year-old female with significant past medical history for Alzheimer's dementia, hyperlipidemia, hypertension, coronary artery disease presents to the ED with complaints of diffuse abdominal pain x 1 day.  Patient does have history of constipation.  Labs, CT rule out infectious etiology for source of symptoms, colitis, gastroenteritis, gastritis, obstruction.

## 2024-09-15 NOTE — ED PROVIDER NOTE - PATIENT PORTAL LINK FT
You can access the FollowMyHealth Patient Portal offered by [facility] by registering at the following website: [email or web address]. By joining [email or web address]’s FollowMyHealth portal, you will also be able to view your health information using other applications (apps) compatible with our system.

## 2024-09-23 ENCOUNTER — APPOINTMENT (OUTPATIENT)
Dept: ORTHOPEDIC SURGERY | Facility: CLINIC | Age: 84
End: 2024-09-23
Payer: MEDICARE

## 2024-09-23 DIAGNOSIS — Z00.00 ENCOUNTER FOR GENERAL ADULT MEDICAL EXAMINATION W/OUT ABNORMAL FINDINGS: ICD-10-CM

## 2024-09-23 PROCEDURE — 99213 OFFICE O/P EST LOW 20 MIN: CPT

## 2024-09-23 NOTE — HISTORY OF PRESENT ILLNESS
[de-identified] :  09/23/2024: worsening left knee pain zilretta in july and ha inj in march with minimal help  07/30/24:  f/u continued pain left knee, that is more constant now.  Wakes her qhs.  It hurts to walk. She also reports continued LBP. She is scheduled for lumbar ablation 8/9/24.   Tylenol without relief. Completed course of Euflexxa end of March.  07/09/2024 had MRI: meniscal tears with arthritis, no insufficiency fractures 06/24/2024 Having constant pain left knee 04/26/2024 Didn't get the most benefit from viscoinjection series 03/26/2024 Euflexxa 3 03/19/2024 Euflexxa 2 03/12/2024 here for L knee pain, no relief with CSI January. She is doing PT, currently in [facility], s/p fall months ago. Exercises and leg machines aggravate. She is supposed to use a walker, but, is able to walk unassisted. No locking or clicking. She is requesting to repeat visco series. 01/12/2024 requesting CSI left knee 8/17/23-  for continued euflexxa inj left knee #3 7/27/23: Follow up LEft knee. CSI at last visit was helpful for 2 wks.  The patient is a 83 year old R hand dominant F who presents today complaining of left knee pain.  Pt had Viscosupplementation and they did not help Date of Injury/Onset:  gradual Pain:    At Rest: 5/10  With Activity:  5/10  Mechanism of injury: gradual over time  This is not a Work Related Injury being treated under Worker's Compensation. This is not an athletic injury occurring associated with an interscholastic or organized sports team. Quality of symptoms: burning shooting stabbing  Improves with: rest  Worse with: activity  Prior treatment: euflexxa inj Prior Imaging: xrays  School/Sport/Position/Occupation: Retired Additional Information: None 
DISPLAY PLAN FREE TEXT

## 2024-09-23 NOTE — ASSESSMENT
[FreeTextEntry1] : flare up L knee OA MRI reviewed, not much else to do at this point, not a surgical candidate We will repeat  HA injections again but she is 4 days too soon we will see her in 1 week for start of Euflexxa series

## 2024-09-30 ENCOUNTER — APPOINTMENT (OUTPATIENT)
Dept: GASTROENTEROLOGY | Facility: HOSPITAL | Age: 84
End: 2024-09-30

## 2024-10-01 ENCOUNTER — APPOINTMENT (OUTPATIENT)
Dept: ORTHOPEDIC SURGERY | Facility: CLINIC | Age: 84
End: 2024-10-01
Payer: MEDICARE

## 2024-10-01 PROCEDURE — 20610 DRAIN/INJ JOINT/BURSA W/O US: CPT | Mod: LT

## 2024-10-01 PROCEDURE — 99213 OFFICE O/P EST LOW 20 MIN: CPT | Mod: 25

## 2024-10-01 NOTE — ASSESSMENT
[FreeTextEntry1] :  MRI reviewed, not much else to do at this point, not a surgical candidate f/u one week to continue series

## 2024-10-01 NOTE — HISTORY OF PRESENT ILLNESS
[de-identified] :   10/01/2024: for euflexxa #1 left knee  09/23/2024: worsening left knee pain zilretta in july and ha inj in march with minimal help  07/30/24:  f/u continued pain left knee, that is more constant now.  Wakes her qhs.  It hurts to walk. She also reports continued LBP. She is scheduled for lumbar ablation 8/9/24.   Tylenol without relief. Completed course of Euflexxa end of March.  07/09/2024 had MRI: meniscal tears with arthritis, no insufficiency fractures 06/24/2024 Having constant pain left knee 04/26/2024 Didn't get the most benefit from viscoinjection series 03/26/2024 Euflexxa 3 03/19/2024 Euflexxa 2 03/12/2024 here for L knee pain, no relief with CSI January. [facility] is [facility], [facility], s/p fall months ago. Exercises and leg machines aggravate. She is supposed to use a walker, but, is able to walk unassisted. No locking or clicking. She is requesting to repeat visco series. 01/12/2024 requesting CSI left knee 8/17/23-  for continued euflexxa inj left knee #3 7/27/23: Follow up LEft knee. CSI at last visit was helpful for 2 wks.  The patient is a 83 year old R hand dominant F who presents today complaining of left knee pain.  Pt had Viscosupplementation and they did not help Date of Injury/Onset:  gradual Pain:    At Rest: 5/10  With Activity:  5/10  Mechanism of injury: gradual over time  This is not a Work Related Injury being treated under Worker's Compensation. This is not an athletic injury occurring associated with an interscholastic or organized sports team. Quality of symptoms: burning shooting stabbing  Improves with: rest  Worse with: activity  Prior treatment: euflexxa inj Prior Imaging: xrays  School/Sport/Position/Occupation: Retired Additional Information: None

## 2024-10-01 NOTE — PROCEDURE
[Large Joint Injection] : Large joint injection [Left] : of the left [Knee] : knee [Pain] : pain [Inflammation] : inflammation [X-ray evidence of Osteoarthritis on this or prior visit] : x-ray evidence of Osteoarthritis on this or prior visit [Alcohol] : alcohol [Betadine] : betadine [Ethyl Chloride sprayed topically] : ethyl chloride sprayed topically [Sterile technique used] : sterile technique used [Euflexxa(20mg)] : 20mg of Euflexxa [#1] : series #1 [] : Patient tolerated procedure well [Call if redness, pain or fever occur] : call if redness, pain or fever occur [Apply ice for 15min out of every hour for the next 12-24 hours as tolerated] : apply ice for 15 minutes out of every hour for the next 12-24 hours as tolerated [Patient was advised to rest the joint(s) for ____ days] : patient was advised to rest the joint(s) for [unfilled] days [Previous OTC use and PT nontherapeutic] : patient has tried OTC's including aspirin, Ibuprofen, Aleve, etc or prescription NSAIDS, and/or exercises at home and/or physical therapy without satisfactory response [Patient had decreased mobility in the joint] : patient had decreased mobility in the joint [Risks, benefits, alternatives discussed / Verbal consent obtained] : the risks benefits, and alternatives have been discussed, and verbal consent was obtained

## 2024-10-07 ENCOUNTER — APPOINTMENT (OUTPATIENT)
Dept: ORTHOPEDIC SURGERY | Facility: CLINIC | Age: 84
End: 2024-10-07

## 2024-10-08 ENCOUNTER — APPOINTMENT (OUTPATIENT)
Dept: ORTHOPEDIC SURGERY | Facility: CLINIC | Age: 84
End: 2024-10-08

## 2024-10-08 DIAGNOSIS — G89.29 PAIN IN LEFT KNEE: ICD-10-CM

## 2024-10-08 DIAGNOSIS — M25.562 PAIN IN LEFT KNEE: ICD-10-CM

## 2024-10-08 PROCEDURE — 20610 DRAIN/INJ JOINT/BURSA W/O US: CPT | Mod: LT

## 2024-10-08 PROCEDURE — 99212 OFFICE O/P EST SF 10 MIN: CPT | Mod: 25

## 2024-10-14 ENCOUNTER — APPOINTMENT (OUTPATIENT)
Dept: GASTROENTEROLOGY | Facility: CLINIC | Age: 84
End: 2024-10-14
Payer: MEDICARE

## 2024-10-14 VITALS
HEIGHT: 61 IN | WEIGHT: 115.5 LBS | SYSTOLIC BLOOD PRESSURE: 137 MMHG | TEMPERATURE: 97.9 F | RESPIRATION RATE: 15 BRPM | BODY MASS INDEX: 21.81 KG/M2 | DIASTOLIC BLOOD PRESSURE: 77 MMHG | OXYGEN SATURATION: 100 % | HEART RATE: 70 BPM

## 2024-10-14 DIAGNOSIS — K57.32 DIVERTICULITIS OF LARGE INTESTINE W/OUT PERFORATION OR ABSCESS W/OUT BLEEDING: ICD-10-CM

## 2024-10-14 DIAGNOSIS — R19.4 CHANGE IN BOWEL HABIT: ICD-10-CM

## 2024-10-14 DIAGNOSIS — K20.90 ESOPHAGITIS, UNSPECIFIED WITHOUT BLEEDING: ICD-10-CM

## 2024-10-14 DIAGNOSIS — K58.9 IRRITABLE BOWEL SYNDROME, UNSPECIFIED: ICD-10-CM

## 2024-10-14 PROCEDURE — 99214 OFFICE O/P EST MOD 30 MIN: CPT

## 2024-10-14 RX ORDER — SODIUM SULFATE, POTASSIUM SULFATE AND MAGNESIUM SULFATE 1.6; 3.13; 17.5 G/177ML; G/177ML; G/177ML
17.5-3.13-1.6 SOLUTION ORAL
Qty: 177 | Refills: 0 | Status: ACTIVE | COMMUNITY
Start: 2024-10-14 | End: 1900-01-01

## 2024-10-15 ENCOUNTER — APPOINTMENT (OUTPATIENT)
Dept: ORTHOPEDIC SURGERY | Facility: CLINIC | Age: 84
End: 2024-10-15

## 2024-10-15 DIAGNOSIS — M17.12 UNILATERAL PRIMARY OSTEOARTHRITIS, LEFT KNEE: ICD-10-CM

## 2024-10-15 PROCEDURE — 99212 OFFICE O/P EST SF 10 MIN: CPT | Mod: 25

## 2024-10-15 PROCEDURE — 20610 DRAIN/INJ JOINT/BURSA W/O US: CPT | Mod: LT

## 2024-10-18 ENCOUNTER — APPOINTMENT (OUTPATIENT)
Dept: OTOLARYNGOLOGY | Facility: CLINIC | Age: 84
End: 2024-10-18
Payer: MEDICARE

## 2024-10-18 VITALS
SYSTOLIC BLOOD PRESSURE: 144 MMHG | HEIGHT: 61 IN | WEIGHT: 118 LBS | BODY MASS INDEX: 22.28 KG/M2 | HEART RATE: 71 BPM | DIASTOLIC BLOOD PRESSURE: 83 MMHG

## 2024-10-18 DIAGNOSIS — D68.9 COAGULATION DEFECT, UNSPECIFIED: ICD-10-CM

## 2024-10-18 DIAGNOSIS — R04.0 EPISTAXIS: ICD-10-CM

## 2024-10-18 PROCEDURE — 30903 CONTROL OF NOSEBLEED: CPT | Mod: LT

## 2024-10-18 PROCEDURE — 99213 OFFICE O/P EST LOW 20 MIN: CPT | Mod: 25

## 2024-10-18 RX ORDER — MUPIROCIN 20 MG/G
2 OINTMENT TOPICAL
Qty: 1 | Refills: 5 | Status: ACTIVE | COMMUNITY
Start: 2024-10-18 | End: 1900-01-01

## 2024-10-23 ENCOUNTER — APPOINTMENT (OUTPATIENT)
Dept: PAIN MANAGEMENT | Facility: CLINIC | Age: 84
End: 2024-10-23

## 2024-11-04 NOTE — PHYSICAL EXAM
Social Work-Spoke with Beth at [facility].They have not received auth. Caroline   [Alert] : alert [Normal Voice/Communication] : normal voice/communication [Healthy Appearing] : healthy appearing [No Acute Distress] : no acute distress [Sclera] : the sclera and conjunctiva were normal [Hearing Threshold Finger Rub Not [place]] : hearing was normal [Normal Lips/Gums] : the lips and gums were normal [Oropharynx] : the oropharynx was normal [Normal Appearance] : the appearance of the neck was normal [No Neck Mass] : no neck mass was observed [No Respiratory Distress] : no respiratory distress [No Acc Muscle Use] : no accessory muscle use [Respiration, Rhythm And Depth] : normal respiratory rhythm and effort [Auscultation Breath Sounds / Voice Sounds] : lungs were clear to auscultation bilaterally [Heart Rate And Rhythm] : heart rate was normal and rhythm regular [Normal S1, S2] : normal S1 and S2 [Murmurs] : no murmurs [Bowel Sounds] : normal bowel sounds [Abdomen Tenderness] : non-tender [No Masses] : no abdominal mass palpated [Abdomen Soft] : soft [] : no hepatosplenomegaly [Oriented To Time, Place, And Person] : oriented to person, place, and time

## 2024-11-10 ENCOUNTER — NON-APPOINTMENT (OUTPATIENT)
Age: 84
End: 2024-11-10

## 2024-11-15 NOTE — ED PROVIDER NOTE - AVIAN FLU SYMPTOMS
November 15, 2024     Patient: Pelon Kimball   YOB: 2014   Date of Visit: 11/15/2024       To Whom It May Concern:    Pelon Kimball was seen in my clinic on 11/15/2024 at 8:50 am. Please excuse Pelon for her absence from school on this day to make the appointment.    May return to school on 11/18/24    If you have any questions or concerns, please don't hesitate to call.         Sincerely,         Terrie Humphries PA-C        CC: No Recipients  
No

## 2024-11-20 ENCOUNTER — APPOINTMENT (OUTPATIENT)
Dept: OTOLARYNGOLOGY | Facility: CLINIC | Age: 84
End: 2024-11-20
Payer: MEDICARE

## 2024-11-20 VITALS
BODY MASS INDEX: 22.04 KG/M2 | SYSTOLIC BLOOD PRESSURE: 137 MMHG | WEIGHT: 118.25 LBS | HEART RATE: 72 BPM | HEIGHT: 61.5 IN | DIASTOLIC BLOOD PRESSURE: 77 MMHG

## 2024-11-20 DIAGNOSIS — R04.0 EPISTAXIS: ICD-10-CM

## 2024-11-20 DIAGNOSIS — D68.9 COAGULATION DEFECT, UNSPECIFIED: ICD-10-CM

## 2024-11-20 DIAGNOSIS — J31.0 CHRONIC RHINITIS: ICD-10-CM

## 2024-11-20 PROCEDURE — 99213 OFFICE O/P EST LOW 20 MIN: CPT

## 2024-12-03 NOTE — ED ADULT NURSE NOTE - ALCOHOL PRE SCREEN (AUDIT - C)
Detail Level: Detailed
Quality 226: Preventive Care And Screening: Tobacco Use: Screening And Cessation Intervention: Patient screened for tobacco use and is an ex/non-smoker
Quality 47: Advance Care Plan: Advance Care Planning discussed and documented; advance care plan or surrogate decision maker documented in the medical record.
Statement Selected

## 2024-12-06 ENCOUNTER — OUTPATIENT (OUTPATIENT)
Dept: OUTPATIENT SERVICES | Facility: HOSPITAL | Age: 84
LOS: 1 days | End: 2024-12-06
Payer: MEDICARE

## 2024-12-06 ENCOUNTER — APPOINTMENT (OUTPATIENT)
Dept: GASTROENTEROLOGY | Facility: HOSPITAL | Age: 84
End: 2024-12-06

## 2024-12-06 DIAGNOSIS — Z98.89 OTHER SPECIFIED POSTPROCEDURAL STATES: Chronic | ICD-10-CM

## 2024-12-06 DIAGNOSIS — Z98.890 OTHER SPECIFIED POSTPROCEDURAL STATES: Chronic | ICD-10-CM

## 2024-12-06 DIAGNOSIS — Z90.710 ACQUIRED ABSENCE OF BOTH CERVIX AND UTERUS: Chronic | ICD-10-CM

## 2024-12-06 DIAGNOSIS — R10.30 LOWER ABDOMINAL PAIN, UNSPECIFIED: ICD-10-CM

## 2024-12-06 DIAGNOSIS — Z90.49 ACQUIRED ABSENCE OF OTHER SPECIFIED PARTS OF DIGESTIVE TRACT: Chronic | ICD-10-CM

## 2024-12-06 DIAGNOSIS — Z12.11 ENCOUNTER FOR SCREENING FOR MALIGNANT NEOPLASM OF COLON: ICD-10-CM

## 2024-12-06 DIAGNOSIS — Z98.82 BREAST IMPLANT STATUS: Chronic | ICD-10-CM

## 2024-12-06 PROCEDURE — 43239 EGD BIOPSY SINGLE/MULTIPLE: CPT

## 2024-12-06 PROCEDURE — 88312 SPECIAL STAINS GROUP 1: CPT

## 2024-12-06 PROCEDURE — 45380 COLONOSCOPY AND BIOPSY: CPT

## 2024-12-06 PROCEDURE — 88312 SPECIAL STAINS GROUP 1: CPT | Mod: 26

## 2024-12-06 PROCEDURE — 88305 TISSUE EXAM BY PATHOLOGIST: CPT | Mod: 26

## 2024-12-06 PROCEDURE — 88305 TISSUE EXAM BY PATHOLOGIST: CPT

## 2024-12-21 ENCOUNTER — APPOINTMENT (OUTPATIENT)
Dept: GASTROENTEROLOGY | Facility: CLINIC | Age: 84
End: 2024-12-21
Payer: MEDICARE

## 2024-12-21 VITALS
BODY MASS INDEX: 20.87 KG/M2 | WEIGHT: 112 LBS | DIASTOLIC BLOOD PRESSURE: 77 MMHG | HEIGHT: 61.5 IN | OXYGEN SATURATION: 98 % | SYSTOLIC BLOOD PRESSURE: 155 MMHG | HEART RATE: 74 BPM

## 2024-12-21 DIAGNOSIS — K52.9 NONINFECTIVE GASTROENTERITIS AND COLITIS, UNSPECIFIED: ICD-10-CM

## 2024-12-21 DIAGNOSIS — Z87.19 PERSONAL HISTORY OF OTHER DISEASES OF THE DIGESTIVE SYSTEM: ICD-10-CM

## 2024-12-21 PROCEDURE — 99214 OFFICE O/P EST MOD 30 MIN: CPT

## 2024-12-21 RX ORDER — BUDESONIDE 3 MG/1
3 CAPSULE, COATED PELLETS ORAL
Qty: 60 | Refills: 3 | Status: ACTIVE | COMMUNITY
Start: 2024-12-21 | End: 1900-01-01

## 2024-12-21 RX ORDER — FAMOTIDINE 40 MG/1
40 TABLET, FILM COATED ORAL TWICE DAILY
Qty: 60 | Refills: 3 | Status: ACTIVE | COMMUNITY
Start: 2024-12-21 | End: 1900-01-01

## 2025-01-31 ENCOUNTER — APPOINTMENT (OUTPATIENT)
Dept: GASTROENTEROLOGY | Facility: CLINIC | Age: 85
End: 2025-01-31
Payer: MEDICARE

## 2025-01-31 VITALS
SYSTOLIC BLOOD PRESSURE: 147 MMHG | WEIGHT: 115 LBS | TEMPERATURE: 98.2 F | RESPIRATION RATE: 16 BRPM | DIASTOLIC BLOOD PRESSURE: 61 MMHG | HEART RATE: 77 BPM | HEIGHT: 61.5 IN | BODY MASS INDEX: 21.43 KG/M2 | OXYGEN SATURATION: 99 %

## 2025-01-31 DIAGNOSIS — K57.32 DIVERTICULITIS OF LARGE INTESTINE W/OUT PERFORATION OR ABSCESS W/OUT BLEEDING: ICD-10-CM

## 2025-01-31 DIAGNOSIS — K52.9 NONINFECTIVE GASTROENTERITIS AND COLITIS, UNSPECIFIED: ICD-10-CM

## 2025-01-31 DIAGNOSIS — K58.9 IRRITABLE BOWEL SYNDROME, UNSPECIFIED: ICD-10-CM

## 2025-01-31 PROCEDURE — 99213 OFFICE O/P EST LOW 20 MIN: CPT

## 2025-02-16 ENCOUNTER — NON-APPOINTMENT (OUTPATIENT)
Age: 85
End: 2025-02-16

## 2025-03-06 ENCOUNTER — APPOINTMENT (OUTPATIENT)
Dept: GASTROENTEROLOGY | Facility: CLINIC | Age: 85
End: 2025-03-06
Payer: MEDICARE

## 2025-03-06 VITALS
HEART RATE: 75 BPM | BODY MASS INDEX: 21.43 KG/M2 | WEIGHT: 115 LBS | OXYGEN SATURATION: 97 % | HEIGHT: 61.5 IN | DIASTOLIC BLOOD PRESSURE: 70 MMHG | SYSTOLIC BLOOD PRESSURE: 130 MMHG

## 2025-03-06 DIAGNOSIS — K59.09 OTHER CONSTIPATION: ICD-10-CM

## 2025-03-06 DIAGNOSIS — M51.26 OTHER INTERVERTEBRAL DISC DISPLACEMENT, LUMBAR REGION: ICD-10-CM

## 2025-03-06 DIAGNOSIS — Z98.1 ARTHRODESIS STATUS: ICD-10-CM

## 2025-03-06 DIAGNOSIS — D68.9 COAGULATION DEFECT, UNSPECIFIED: ICD-10-CM

## 2025-03-06 DIAGNOSIS — M48.062 SPINAL STENOSIS, LUMBAR REGION WITH NEUROGENIC CLAUDICATION: ICD-10-CM

## 2025-03-06 DIAGNOSIS — M11.20 OTHER CHONDROCALCINOSIS, UNSPECIFIED SITE: ICD-10-CM

## 2025-03-06 DIAGNOSIS — R10.30 LOWER ABDOMINAL PAIN, UNSPECIFIED: ICD-10-CM

## 2025-03-06 DIAGNOSIS — K57.32 DIVERTICULITIS OF LARGE INTESTINE W/OUT PERFORATION OR ABSCESS W/OUT BLEEDING: ICD-10-CM

## 2025-03-06 PROCEDURE — 99215 OFFICE O/P EST HI 40 MIN: CPT

## 2025-03-06 RX ORDER — CLOPIDOGREL BISULFATE 75 MG/1
75 TABLET, FILM COATED ORAL
Refills: 0 | Status: ACTIVE | COMMUNITY

## 2025-03-06 RX ORDER — ASPIRIN 325 MG/1
TABLET, FILM COATED ORAL
Refills: 0 | Status: ACTIVE | COMMUNITY

## 2025-03-06 RX ORDER — ROSUVASTATIN CALCIUM 20 MG/1
20 TABLET, FILM COATED ORAL
Refills: 0 | Status: ACTIVE | COMMUNITY

## 2025-03-06 RX ORDER — NALOXEGOL OXALATE 25 MG/1
TABLET, FILM COATED ORAL
Refills: 0 | Status: ACTIVE | COMMUNITY

## 2025-03-06 RX ORDER — DONEPEZIL HYDROCHLORIDE 10 MG/1
10 TABLET ORAL
Refills: 0 | Status: ACTIVE | COMMUNITY

## 2025-03-06 RX ORDER — OMEPRAZOLE 40 MG/1
40 CAPSULE, DELAYED RELEASE ORAL
Refills: 0 | Status: ACTIVE | COMMUNITY

## 2025-03-06 RX ORDER — TRAZODONE HYDROCHLORIDE 50 MG/1
50 TABLET ORAL
Refills: 0 | Status: ACTIVE | COMMUNITY

## 2025-03-06 RX ORDER — METHOCARBAMOL 1000 MG/1
TABLET, FILM COATED ORAL
Refills: 0 | Status: ACTIVE | COMMUNITY

## 2025-03-06 RX ORDER — DULOXETINE HYDROCHLORIDE 40 MG/1
CAPSULE, DELAYED RELEASE PELLETS ORAL
Refills: 0 | Status: ACTIVE | COMMUNITY

## 2025-03-06 RX ORDER — GLUCOSAMINE HCL/CHONDROITIN SU 500-400 MG
3 CAPSULE ORAL
Refills: 0 | Status: ACTIVE | COMMUNITY

## 2025-03-06 RX ORDER — SENNOSIDES 8.6 MG TABLETS 8.6 MG/1
8.6 TABLET ORAL
Refills: 0 | Status: ACTIVE | COMMUNITY

## 2025-03-06 RX ORDER — ONDANSETRON 4 MG/1
4 TABLET, ORALLY DISINTEGRATING ORAL
Refills: 0 | Status: ACTIVE | COMMUNITY

## 2025-03-06 RX ORDER — DOCUSATE SODIUM 100 MG/1
CAPSULE ORAL
Refills: 0 | Status: ACTIVE | COMMUNITY

## 2025-03-06 RX ORDER — LEVOTHYROXINE SODIUM 0.17 MG/1
TABLET ORAL
Refills: 0 | Status: ACTIVE | COMMUNITY

## 2025-03-14 ENCOUNTER — APPOINTMENT (OUTPATIENT)
Dept: GASTROENTEROLOGY | Facility: CLINIC | Age: 85
End: 2025-03-14

## 2025-03-26 NOTE — ED ADULT TRIAGE NOTE - AS PAIN REST
Detail Level: Zone Samples Given: Opzelura Render In Strict Bullet Format?: No Initiate Treatment: Triamcinolone cream apply to arms twice a day for 2 weeks 0 (no pain/absence of nonverbal indicators of pain)

## 2025-04-04 ENCOUNTER — APPOINTMENT (OUTPATIENT)
Dept: OTOLARYNGOLOGY | Facility: CLINIC | Age: 85
End: 2025-04-04
Payer: MEDICARE

## 2025-04-04 VITALS
HEART RATE: 74 BPM | DIASTOLIC BLOOD PRESSURE: 72 MMHG | HEIGHT: 61 IN | WEIGHT: 115.38 LBS | BODY MASS INDEX: 21.79 KG/M2 | SYSTOLIC BLOOD PRESSURE: 138 MMHG

## 2025-04-04 DIAGNOSIS — D68.9 COAGULATION DEFECT, UNSPECIFIED: ICD-10-CM

## 2025-04-04 DIAGNOSIS — R04.0 EPISTAXIS: ICD-10-CM

## 2025-04-04 PROCEDURE — 99213 OFFICE O/P EST LOW 20 MIN: CPT | Mod: 25

## 2025-04-04 PROCEDURE — 30903 CONTROL OF NOSEBLEED: CPT | Mod: LT

## 2025-04-18 ENCOUNTER — APPOINTMENT (OUTPATIENT)
Dept: OTOLARYNGOLOGY | Facility: CLINIC | Age: 85
End: 2025-04-18
Payer: MEDICARE

## 2025-04-18 VITALS
BODY MASS INDEX: 21.71 KG/M2 | SYSTOLIC BLOOD PRESSURE: 149 MMHG | DIASTOLIC BLOOD PRESSURE: 83 MMHG | HEART RATE: 73 BPM | HEIGHT: 61 IN | WEIGHT: 115 LBS

## 2025-04-18 DIAGNOSIS — H93.19 TINNITUS, UNSPECIFIED EAR: ICD-10-CM

## 2025-04-18 DIAGNOSIS — R04.0 EPISTAXIS: ICD-10-CM

## 2025-04-18 DIAGNOSIS — D68.9 COAGULATION DEFECT, UNSPECIFIED: ICD-10-CM

## 2025-04-18 DIAGNOSIS — J31.0 CHRONIC RHINITIS: ICD-10-CM

## 2025-04-18 PROCEDURE — 92567 TYMPANOMETRY: CPT

## 2025-04-18 PROCEDURE — 92557 COMPREHENSIVE HEARING TEST: CPT

## 2025-04-18 PROCEDURE — 99213 OFFICE O/P EST LOW 20 MIN: CPT

## 2025-04-24 ENCOUNTER — APPOINTMENT (OUTPATIENT)
Dept: SURGERY | Facility: CLINIC | Age: 85
End: 2025-04-24

## 2025-04-24 VITALS
BODY MASS INDEX: 21.71 KG/M2 | DIASTOLIC BLOOD PRESSURE: 81 MMHG | SYSTOLIC BLOOD PRESSURE: 145 MMHG | HEART RATE: 87 BPM | WEIGHT: 115 LBS | HEIGHT: 61 IN | OXYGEN SATURATION: 98 %

## 2025-04-26 ENCOUNTER — EMERGENCY (EMERGENCY)
Facility: HOSPITAL | Age: 85
LOS: 1 days | End: 2025-04-26
Attending: EMERGENCY MEDICINE | Admitting: EMERGENCY MEDICINE
Payer: MEDICARE

## 2025-04-26 VITALS
RESPIRATION RATE: 19 BRPM | TEMPERATURE: 98 F | HEART RATE: 78 BPM | DIASTOLIC BLOOD PRESSURE: 73 MMHG | SYSTOLIC BLOOD PRESSURE: 157 MMHG | OXYGEN SATURATION: 96 %

## 2025-04-26 VITALS
HEART RATE: 75 BPM | OXYGEN SATURATION: 98 % | TEMPERATURE: 99 F | SYSTOLIC BLOOD PRESSURE: 164 MMHG | HEIGHT: 61 IN | WEIGHT: 115.08 LBS | DIASTOLIC BLOOD PRESSURE: 100 MMHG | RESPIRATION RATE: 20 BRPM

## 2025-04-26 DIAGNOSIS — Z98.89 OTHER SPECIFIED POSTPROCEDURAL STATES: Chronic | ICD-10-CM

## 2025-04-26 DIAGNOSIS — Z98.890 OTHER SPECIFIED POSTPROCEDURAL STATES: Chronic | ICD-10-CM

## 2025-04-26 DIAGNOSIS — Z90.710 ACQUIRED ABSENCE OF BOTH CERVIX AND UTERUS: Chronic | ICD-10-CM

## 2025-04-26 DIAGNOSIS — Z90.49 ACQUIRED ABSENCE OF OTHER SPECIFIED PARTS OF DIGESTIVE TRACT: Chronic | ICD-10-CM

## 2025-04-26 DIAGNOSIS — Z98.82 BREAST IMPLANT STATUS: Chronic | ICD-10-CM

## 2025-04-26 LAB
ALBUMIN SERPL ELPH-MCNC: 3.8 G/DL — SIGNIFICANT CHANGE UP (ref 3.3–5)
ALP SERPL-CCNC: 82 U/L — SIGNIFICANT CHANGE UP (ref 40–120)
ALT FLD-CCNC: 19 U/L — SIGNIFICANT CHANGE UP (ref 12–78)
ANION GAP SERPL CALC-SCNC: 4 MMOL/L — LOW (ref 5–17)
APPEARANCE UR: ABNORMAL
APTT BLD: 29 SEC — SIGNIFICANT CHANGE UP (ref 26.1–36.8)
AST SERPL-CCNC: 20 U/L — SIGNIFICANT CHANGE UP (ref 15–37)
BASOPHILS # BLD AUTO: 0.03 K/UL — SIGNIFICANT CHANGE UP (ref 0–0.2)
BASOPHILS NFR BLD AUTO: 0.4 % — SIGNIFICANT CHANGE UP (ref 0–2)
BILIRUB SERPL-MCNC: 0.3 MG/DL — SIGNIFICANT CHANGE UP (ref 0.2–1.2)
BILIRUB UR-MCNC: NEGATIVE — SIGNIFICANT CHANGE UP
BUN SERPL-MCNC: 16 MG/DL — SIGNIFICANT CHANGE UP (ref 7–23)
CALCIUM SERPL-MCNC: 9.2 MG/DL — SIGNIFICANT CHANGE UP (ref 8.5–10.1)
CHLORIDE SERPL-SCNC: 105 MMOL/L — SIGNIFICANT CHANGE UP (ref 96–108)
CO2 SERPL-SCNC: 28 MMOL/L — SIGNIFICANT CHANGE UP (ref 22–31)
COLOR SPEC: YELLOW — SIGNIFICANT CHANGE UP
CREAT SERPL-MCNC: 0.81 MG/DL — SIGNIFICANT CHANGE UP (ref 0.5–1.3)
DIFF PNL FLD: ABNORMAL
EGFR: 71 ML/MIN/1.73M2 — SIGNIFICANT CHANGE UP
EGFR: 71 ML/MIN/1.73M2 — SIGNIFICANT CHANGE UP
EOSINOPHIL # BLD AUTO: 0.09 K/UL — SIGNIFICANT CHANGE UP (ref 0–0.5)
EOSINOPHIL NFR BLD AUTO: 1.2 % — SIGNIFICANT CHANGE UP (ref 0–6)
GLUCOSE SERPL-MCNC: 130 MG/DL — HIGH (ref 70–99)
GLUCOSE UR QL: NEGATIVE MG/DL — SIGNIFICANT CHANGE UP
HCT VFR BLD CALC: 44 % — SIGNIFICANT CHANGE UP (ref 34.5–45)
HGB BLD-MCNC: 13.8 G/DL — SIGNIFICANT CHANGE UP (ref 11.5–15.5)
IMM GRANULOCYTES NFR BLD AUTO: 0.4 % — SIGNIFICANT CHANGE UP (ref 0–0.9)
INR BLD: 0.93 RATIO — SIGNIFICANT CHANGE UP (ref 0.85–1.16)
KETONES UR-MCNC: NEGATIVE MG/DL — SIGNIFICANT CHANGE UP
LEUKOCYTE ESTERASE UR-ACNC: ABNORMAL
LIDOCAIN IGE QN: 48 U/L — SIGNIFICANT CHANGE UP (ref 13–75)
LYMPHOCYTES # BLD AUTO: 1.27 K/UL — SIGNIFICANT CHANGE UP (ref 1–3.3)
LYMPHOCYTES # BLD AUTO: 17.1 % — SIGNIFICANT CHANGE UP (ref 13–44)
MCHC RBC-ENTMCNC: 27.3 PG — SIGNIFICANT CHANGE UP (ref 27–34)
MCHC RBC-ENTMCNC: 31.4 G/DL — LOW (ref 32–36)
MCV RBC AUTO: 87.1 FL — SIGNIFICANT CHANGE UP (ref 80–100)
MONOCYTES # BLD AUTO: 0.47 K/UL — SIGNIFICANT CHANGE UP (ref 0–0.9)
MONOCYTES NFR BLD AUTO: 6.3 % — SIGNIFICANT CHANGE UP (ref 2–14)
NEUTROPHILS # BLD AUTO: 5.52 K/UL — SIGNIFICANT CHANGE UP (ref 1.8–7.4)
NEUTROPHILS NFR BLD AUTO: 74.6 % — SIGNIFICANT CHANGE UP (ref 43–77)
NITRITE UR-MCNC: POSITIVE
NRBC BLD AUTO-RTO: 0 /100 WBCS — SIGNIFICANT CHANGE UP (ref 0–0)
PH UR: 7.5 — SIGNIFICANT CHANGE UP (ref 5–8)
PLATELET # BLD AUTO: 286 K/UL — SIGNIFICANT CHANGE UP (ref 150–400)
POTASSIUM SERPL-MCNC: 4.2 MMOL/L — SIGNIFICANT CHANGE UP (ref 3.5–5.3)
POTASSIUM SERPL-SCNC: 4.2 MMOL/L — SIGNIFICANT CHANGE UP (ref 3.5–5.3)
PROT SERPL-MCNC: 7.3 G/DL — SIGNIFICANT CHANGE UP (ref 6–8.3)
PROT UR-MCNC: NEGATIVE MG/DL — SIGNIFICANT CHANGE UP
PROTHROM AB SERPL-ACNC: 11 SEC — SIGNIFICANT CHANGE UP (ref 9.9–13.4)
RBC # BLD: 5.05 M/UL — SIGNIFICANT CHANGE UP (ref 3.8–5.2)
RBC # FLD: 17.1 % — HIGH (ref 10.3–14.5)
SODIUM SERPL-SCNC: 137 MMOL/L — SIGNIFICANT CHANGE UP (ref 135–145)
SP GR SPEC: 1.01 — SIGNIFICANT CHANGE UP (ref 1–1.03)
UROBILINOGEN FLD QL: 0.2 MG/DL — SIGNIFICANT CHANGE UP (ref 0.2–1)
WBC # BLD: 7.41 K/UL — SIGNIFICANT CHANGE UP (ref 3.8–10.5)
WBC # FLD AUTO: 7.41 K/UL — SIGNIFICANT CHANGE UP (ref 3.8–10.5)

## 2025-04-26 PROCEDURE — 74176 CT ABD & PELVIS W/O CONTRAST: CPT | Mod: 26

## 2025-04-26 PROCEDURE — 96375 TX/PRO/DX INJ NEW DRUG ADDON: CPT

## 2025-04-26 PROCEDURE — 99285 EMERGENCY DEPT VISIT HI MDM: CPT | Mod: FS

## 2025-04-26 PROCEDURE — 99284 EMERGENCY DEPT VISIT MOD MDM: CPT | Mod: 25

## 2025-04-26 PROCEDURE — 83690 ASSAY OF LIPASE: CPT

## 2025-04-26 PROCEDURE — 81001 URINALYSIS AUTO W/SCOPE: CPT

## 2025-04-26 PROCEDURE — 85025 COMPLETE CBC W/AUTO DIFF WBC: CPT

## 2025-04-26 PROCEDURE — 80053 COMPREHEN METABOLIC PANEL: CPT

## 2025-04-26 PROCEDURE — 74019 RADEX ABDOMEN 2 VIEWS: CPT

## 2025-04-26 PROCEDURE — 85610 PROTHROMBIN TIME: CPT

## 2025-04-26 PROCEDURE — 85730 THROMBOPLASTIN TIME PARTIAL: CPT

## 2025-04-26 PROCEDURE — 96374 THER/PROPH/DIAG INJ IV PUSH: CPT

## 2025-04-26 PROCEDURE — 36415 COLL VENOUS BLD VENIPUNCTURE: CPT

## 2025-04-26 PROCEDURE — 87086 URINE CULTURE/COLONY COUNT: CPT

## 2025-04-26 PROCEDURE — 74176 CT ABD & PELVIS W/O CONTRAST: CPT | Mod: MC

## 2025-04-26 PROCEDURE — 74019 RADEX ABDOMEN 2 VIEWS: CPT | Mod: 26

## 2025-04-26 RX ORDER — ONDANSETRON HCL/PF 4 MG/2 ML
4 VIAL (ML) INJECTION ONCE
Refills: 0 | Status: COMPLETED | OUTPATIENT
Start: 2025-04-26 | End: 2025-04-26

## 2025-04-26 RX ORDER — SULFAMETHOXAZOLE/TRIMETHOPRIM 800-160 MG
1 TABLET ORAL
Qty: 14 | Refills: 0
Start: 2025-04-26 | End: 2025-05-02

## 2025-04-26 RX ORDER — SULFAMETHOXAZOLE/TRIMETHOPRIM 800-160 MG
1 TABLET ORAL ONCE
Refills: 0 | Status: COMPLETED | OUTPATIENT
Start: 2025-04-26 | End: 2025-04-26

## 2025-04-26 RX ORDER — SULFAMETHOXAZOLE/TRIMETHOPRIM 800-160 MG
1 TABLET ORAL
Refills: 0 | DISCHARGE

## 2025-04-26 RX ORDER — IOHEXOL 350 MG/ML
30 INJECTION, SOLUTION INTRAVENOUS ONCE
Refills: 0 | Status: COMPLETED | OUTPATIENT
Start: 2025-04-26 | End: 2025-04-26

## 2025-04-26 RX ORDER — IBUPROFEN 200 MG
600 TABLET ORAL ONCE
Refills: 0 | Status: COMPLETED | OUTPATIENT
Start: 2025-04-26 | End: 2025-04-26

## 2025-04-26 RX ORDER — ACETAMINOPHEN 500 MG/5ML
1000 LIQUID (ML) ORAL ONCE
Refills: 0 | Status: COMPLETED | OUTPATIENT
Start: 2025-04-26 | End: 2025-04-26

## 2025-04-26 RX ADMIN — Medication 1 TABLET(S): at 15:54

## 2025-04-26 RX ADMIN — IOHEXOL 30 MILLILITER(S): 350 INJECTION, SOLUTION INTRAVENOUS at 11:30

## 2025-04-26 RX ADMIN — Medication 1000 MILLILITER(S): at 11:30

## 2025-04-26 RX ADMIN — Medication 600 MILLIGRAM(S): at 15:59

## 2025-04-26 RX ADMIN — Medication 400 MILLIGRAM(S): at 11:29

## 2025-04-26 RX ADMIN — Medication 4 MILLIGRAM(S): at 11:29

## 2025-04-26 NOTE — ED PROVIDER NOTE - CLINICAL SUMMARY MEDICAL DECISION MAKING FREE TEXT BOX
Patient is an 85-year-old female resident of assisted living facility who presents emergency room with abdominal pain for approximately 1 month with nausea vomiting.  She has a history of CAD, HTN, HLD, hypothyroid.  She also has a history of tummy tuck and other abdominal surgery.  She has been vomiting as well.  She denies cough chest pain shortness of breath calf pain calf swelling trauma or travel.  She denies any hematuria hematemesis or hematochezia.    On evaluation is a elderly female awake alert and oriented in no distress.  HEENT is unremarkable.  Status post cosmetic surgery.  Neck is supple.  No G/F/R.  Cardiopulmonary examination is normal.  Abdomen is soft with distended abdomen, well healed low abdominoplasty surgical scar.  With an umbilical scar.  The abdomen is slightly distended.  No CVA tenderness.  No guarding or rebound.  Musculoskeletal examination is normal.  Skin exam patient has mild petechiae.  No other rash or bruising.  She has no discoloration.  No pallor.    Plan of care includes IV Ofirmev, CT imaging, abdominal labs and disposition accordingly.  This chart was made with dictation software and may contain typographical errors.

## 2025-04-26 NOTE — ED PROVIDER NOTE - NSFOLLOWUPINSTRUCTIONS_ED_ALL_ED_FT
Follow up with your primary care doctor. Consider follow up with Urology. Return for increased pain, fever, vomiting, worsening condition.    Urinary Tract Infection in Women    WHAT YOU NEED TO KNOW:    What is a urinary tract infection (UTI)? A UTI is caused by bacteria that get inside your urinary tract. Your urinary tract includes your kidneys, ureters, bladder, and urethra. A UTI is more common in your lower urinary tract, which includes your bladder and urethra.  Female Urinary System    What increases my risk for a UTI?    Older age    A urinary catheter or self-catheterization    Pregnancy    Urinary tract problems, such as a narrowing, kidney stones, or inability to empty your bladder completely    History of a UTI    Sexual intercourse    Menopause    Diabetes or obesity  What are the signs and symptoms of a UTI?    Urinating more often than usual, leaking urine, or waking from sleep to urinate    Pain or burning when you urinate    Pain or pressure in your lower abdomen and back    Urine that smells bad    Blood in your urine  How is a UTI diagnosed? Your healthcare provider will ask about your signs and symptoms. Your provider may press on your abdomen, sides, and back to check if you feel pain. You may need any of the following:    Urinalysis will show infection and your overall health.    Urine cultures may show which germ is causing your infection.    CT or MRI pictures may be used if you have UTIs often or do not respond to treatment. You may be given contrast liquid to help the pictures show up better. Tell a healthcare provider if you have ever had an allergic reaction to contrast liquid. Do not enter the MRI room with anything metal. Metal can cause serious injury. Tell a healthcare provider if you have any metal in or on your body.  How is a UTI treated?    Antibiotics treat a bacterial infection. If you have UTIs often (called recurrent UTIs), you may be given antibiotics to take regularly. You will be given directions for when and how to use antibiotics. The goal is to prevent UTIs but not cause antibiotic resistance by using antibiotics too often.    Medicines may be given to decrease pain and burning when you urinate. They will also help decrease the feeling that you need to urinate often. These medicines may make your urine orange or red.  What can I do to prevent a UTI?    Talk to your healthcare provider about your birth control method. You may need to change your method if it is increasing your risk for UTIs.    Empty your bladder often. Urinate and empty your bladder as soon as you feel the need. Do not hold your urine for long periods of time.    Wipe from front to back after you urinate or have a bowel movement. This will help prevent germs from getting into your urinary tract through your urethra.    Drink liquids as directed. Ask how much liquid to drink each day and which liquids are best for you. You may need to drink more liquids than usual to help flush out the bacteria. Do not drink alcohol, caffeine, or citrus juices. These can irritate your bladder and increase your symptoms. Your healthcare provider may recommend cranberry juice to help prevent a UTI.    Urinate before and after you have sex. This can help flush out bacteria passed during sex.    Do not douche or use feminine deodorants. These can change the chemical balance in your vagina.    Change sanitary pads or tampons often. This will help prevent germs from getting into your urinary tract.    Wear cotton underwear and clothes that are loose. Tight pants and nylon underwear can trap moisture and cause bacteria to grow.    Vaginal estrogen may be recommended. This medicine helps prevent UTIs in women who have gone through menopause or are in yefri-menopause.    Do pelvic muscle exercises often. Pelvic muscle exercises may help you start and stop urinating. Strong pelvic muscles may help you empty your bladder easier. Squeeze these muscles tightly for 5 seconds like you are trying to hold back urine. Then relax for 5 seconds. Gradually work up to squeezing for 10 seconds. Do 3 sets of 15 repetitions a day, or as directed.  When should I seek immediate care?    You are urinating very little or not at all.    You have a high fever with shaking chills.    You have side or back pain that gets worse.  When should I call my doctor?    You have a fever.    You do not feel better after 2 days of taking antibiotics.    You have new symptoms, such as blood or pus in your urine.    You are vomiting.    You have questions or concerns about your condition or care.  CARE AGREEMENT:    You have the right to help plan your care. Learn about your health condition and how it may be treated. Discuss treatment options with your healthcare providers to decide what care you want to receive. You always have the right to refuse treatment. Follow up with your primary care doctor. Follow up with Urology. Return for increased pain, fever, vomiting, worsening condition. You have a downey catheter that will need to be removed.     Urinary Tract Infection in Women    WHAT YOU NEED TO KNOW:    What is a urinary tract infection (UTI)? A UTI is caused by bacteria that get inside your urinary tract. Your urinary tract includes your kidneys, ureters, bladder, and urethra. A UTI is more common in your lower urinary tract, which includes your bladder and urethra.  Female Urinary System    What increases my risk for a UTI?    Older age    A urinary catheter or self-catheterization    Pregnancy    Urinary tract problems, such as a narrowing, kidney stones, or inability to empty your bladder completely    History of a UTI    Sexual intercourse    Menopause    Diabetes or obesity  What are the signs and symptoms of a UTI?    Urinating more often than usual, leaking urine, or waking from sleep to urinate    Pain or burning when you urinate    Pain or pressure in your lower abdomen and back    Urine that smells bad    Blood in your urine  How is a UTI diagnosed? Your healthcare provider will ask about your signs and symptoms. Your provider may press on your abdomen, sides, and back to check if you feel pain. You may need any of the following:    Urinalysis will show infection and your overall health.    Urine cultures may show which germ is causing your infection.    CT or MRI pictures may be used if you have UTIs often or do not respond to treatment. You may be given contrast liquid to help the pictures show up better. Tell a healthcare provider if you have ever had an allergic reaction to contrast liquid. Do not enter the MRI room with anything metal. Metal can cause serious injury. Tell a healthcare provider if you have any metal in or on your body.  How is a UTI treated?    Antibiotics treat a bacterial infection. If you have UTIs often (called recurrent UTIs), you may be given antibiotics to take regularly. You will be given directions for when and how to use antibiotics. The goal is to prevent UTIs but not cause antibiotic resistance by using antibiotics too often.    Medicines may be given to decrease pain and burning when you urinate. They will also help decrease the feeling that you need to urinate often. These medicines may make your urine orange or red.  What can I do to prevent a UTI?    Talk to your healthcare provider about your birth control method. You may need to change your method if it is increasing your risk for UTIs.    Empty your bladder often. Urinate and empty your bladder as soon as you feel the need. Do not hold your urine for long periods of time.    Wipe from front to back after you urinate or have a bowel movement. This will help prevent germs from getting into your urinary tract through your urethra.    Drink liquids as directed. Ask how much liquid to drink each day and which liquids are best for you. You may need to drink more liquids than usual to help flush out the bacteria. Do not drink alcohol, caffeine, or citrus juices. These can irritate your bladder and increase your symptoms. Your healthcare provider may recommend cranberry juice to help prevent a UTI.    Urinate before and after you have sex. This can help flush out bacteria passed during sex.    Do not douche or use feminine deodorants. These can change the chemical balance in your vagina.    Change sanitary pads or tampons often. This will help prevent germs from getting into your urinary tract.    Wear cotton underwear and clothes that are loose. Tight pants and nylon underwear can trap moisture and cause bacteria to grow.    Vaginal estrogen may be recommended. This medicine helps prevent UTIs in women who have gone through menopause or are in yefri-menopause.    Do pelvic muscle exercises often. Pelvic muscle exercises may help you start and stop urinating. Strong pelvic muscles may help you empty your bladder easier. Squeeze these muscles tightly for 5 seconds like you are trying to hold back urine. Then relax for 5 seconds. Gradually work up to squeezing for 10 seconds. Do 3 sets of 15 repetitions a day, or as directed.  When should I seek immediate care?    You are urinating very little or not at all.    You have a high fever with shaking chills.    You have side or back pain that gets worse.  When should I call my doctor?    You have a fever.    You do not feel better after 2 days of taking antibiotics.    You have new symptoms, such as blood or pus in your urine.    You are vomiting.    You have questions or concerns about your condition or care.  CARE AGREEMENT:    You have the right to help plan your care. Learn about your health condition and how it may be treated. Discuss treatment options with your healthcare providers to decide what care you want to receive. You always have the right to refuse treatment.

## 2025-04-26 NOTE — ED PROVIDER NOTE - PHYSICAL EXAMINATION
Constitutional: Awake, Alert, non-toxic. NAD. Well appearing, well nourished.   HEAD: Normocephalic, atraumatic.   EYES: EOM intact, conjunctiva and sclera are clear bilaterally.   ENT: No rhinorrhea, patent, mucous membranes pink/moist, no drooling or stridor.   NECK: Supple, non-tender  CARDIOVASCULAR: Normal S1, S2; regular rate and rhythm.  RESPIRATORY: Normal respiratory effort; breath sounds CTAB, no wheezes, rhonchi, or rales. Speaking in full sentences. No accessory muscle use.   ABDOMEN: (+) mild firmness, (+) mild distention, (+) mild lower abdominal TTP, no guarding or rebound TTP. healed umbilical surgical scar.   EXTREMITIES: Full passive and active ROM in all extremities; non-tender to palpation; distal pulses palpable and symmetric  SKIN: Warm, dry; good skin turgor, no apparent lesions or rashes, no ecchymosis, brisk capillary refill.  NEURO: A&O x3. Sensory and motor functions are grossly intact. Speech is normal. Appearance and judgement seem appropriate for gender and age.

## 2025-04-26 NOTE — ED PROVIDER NOTE - CARE PROVIDER_API CALL
Nabeel Manley  Urology  [address], [address]  [place] [address]  Phone: [phone]  Fax: [phone]  Follow Up Time: 4-6 Days

## 2025-04-26 NOTE — ED PROVIDER NOTE - CARE PLAN
Principal Discharge DX:	Acute UTI   1 Principal Discharge DX:	Acute UTI  Secondary Diagnosis:	Urinary retention

## 2025-04-26 NOTE — ED ADULT NURSE REASSESSMENT NOTE - NS ED NURSE REASSESS COMMENT FT1
PVR of 950 ml.  Pt hesitant although agreeable for Smith cath.  Spoke with 2 sons.  Smith cath #[id number] Fr initiated at this time.

## 2025-04-26 NOTE — ED ADULT TRIAGE NOTE - CHIEF COMPLAINT QUOTE
biba (ncpd) for complaints of abdominal pain and nauseas / vomiting for a few days.  from [facility] [place].  patient is alert / oriented x4.  no respiratory distress.

## 2025-04-26 NOTE — ED PROVIDER NOTE - NSICDXFAMILYHX_GEN_ALL_CORE_FT
FAMILY HISTORY:  Father  Still living? Unknown  Family history of Alzheimer's disease, Age at diagnosis: Age Unknown  Family history of Alzheimer's disease, Age at diagnosis: Age Unknown  Family history of skin cancer, Age at diagnosis: Age Unknown    Mother  Still living? No  Family history of Alzheimer's disease, Age at diagnosis: Age Unknown  Family history of Alzheimer's disease, Age at diagnosis: Age Unknown  Family history of diabetes mellitus, Age at diagnosis: Age Unknown  Family history of diabetes mellitus in mother, Age at diagnosis: Age Unknown  Family history of skin cancer, Age at diagnosis: Age Unknown

## 2025-04-26 NOTE — ED PROVIDER NOTE - OBJECTIVE STATEMENT
85-year-old female with past medical history of CAD, hypertension, hyperlipidemia, hypothyroidism presents today due to abdominal pain for 2 days.  Patient describes the pain as aching, constant, and currently 7 out of 10.  Patient admits to numerous episodes of vomiting yesterday.  Patient had 1 episode of diarrhea today and otherwise has not had any bowel movement for 4 days.  PSHx includes appendectomy and hysterectomy.  Patient admits to passing gas.  Patient denies fever, dysuria, hematuria, hematemesis, hematochezia, chest pain, shortness of breath, or any other complaints.

## 2025-04-26 NOTE — ED ADULT NURSE NOTE - NSFALLRISKASMT_ED_ALL_ED_DT
Cardiology  Consult Note     Reason for Consult:  Troponin elevation, ST depressions  Requesting Physician: Dr. Apolinar Cruz  History Obtained From:  patient, electronic medical record  Cardiologist: Dr. Hoyos    CC:  Altered mental status, fall    HPI:       Julienne Vega is a 61 year old female with history of recent thalamic CVA in April, ischemic cardiomyopathy, 90% RCA stenosis, severe mitral regurgitation, HTN, HLD, CKD 3, diabetes who was brought in to the ED from her nursing home due to acute altered mental status and unwitnessed fall.  Nursing staff also noted that she had some slurred speech. Head CT was normal.  Urinalysis is suggestive of UTI.  Cardiology was consulted for troponin elevation of 0.21 and ST depressions in the precordial leads. Patient denies any chest pain or shortness of breath.    Patient was discharged to [facility] for rehab after her last admission at [facility] for CVA.  During that admission, she had acutely developed in terms of heart heart failure exacerbation.  TTE showed an EF of 30%, which was reduced 40 to 45% on TTE one month prior.  Cardiology was consulted and patient was transferred to the ICU for [place] placement in order to monitor her pressures and manage her CHF.  She is started on nitroprusside and then eventually Entresto.  During this time she was found to have severe mitral regurgitation and elevated right-sided filling pressures.  She had a left heart cath that showed 90% stenosis in the RCA and 70% stenosis in the LAD.  She has managed medically. She then had a repeat TTE on 5/2 that showed improved EF of 50%.  After discharge she was seen by Dr. Hoyos who recommended PCI for the RCA stenosis and possible MitraClip. She had a LONNIE done at [facility] for second opinion which demonstrated the severe mitral regurgitation. Per the patient's  this was planned to be done once she has completed her therapies and has returned to her baseline activity.  She was previously seen by cardiac surgery for evaluation for mitral valve repair but was not deemed a good candidate.    Past medical history:    Past Medical History:   Diagnosis Date   • Glaucoma 03/2020   • High cholesterol    • Hypertension    • Hypertensive retinopathy    • Hypertensive retinopathy of both eyes    • Insulin dependent type 2 diabetes mellitus (CMS/[facility])    • Ketoacidosis due to diabetes (CMS/[facility])    • Neovascular glaucoma of right eye    • Ocular hypertension of left eye    • Overweight    • PDR (proliferative diabetic retinopathy) (CMS/[facility])    • Pseudophakia of both eyes    • Type 2 diabetes mellitus with left eye affected by severe nonproliferative retinopathy and macular edema, without long-term current use of insulin (CMS/[facility])    • Type 2 diabetes mellitus with right eye affected by proliferative retinopathy without macular edema, without long-term current use of insulin (CMS/[facility])    • Wound, open, scapula          Past surgical history:    Past Surgical History:   Procedure Laterality Date   • Back surgery  2010    abcess removal   • Coronary angiogram/possible ptca - cv  04/30/2021   • Eye surgery  03/2020   • No past surgeries           Medications prior to admission:    Prior to Admission medications    Medication Sig Start Date End Date Taking? Authorizing Provider   pantoprazole (PROTONIX) 20 MG tablet Take 20 mg by mouth daily.   Yes Outside Provider   potassium CHLORIDE (KLOR-CON M) 20 MEQ mehdi ER tablet Take 40 mEq by mouth daily.   Yes Outside Provider   ferrous sulfate 220 (44 Fe) MG/5ML liquid Take 330 mg by mouth daily. Takes 7.5 ml    Yes Outside Provider   guaifenesin (HUMIBID E) 400 MG Tab Take 1,200 mg by mouth 2 times daily. Cough   Yes Outside Provider   hydrALAZINE (APRESOLINE) 100 MG tablet Take 100 mg by mouth 3 times daily. HTN    Yes Outside Provider   linaGLIPtin (Tradjenta) 5 MG tablet Monitor blood glucose BID before breakfast and dinner, if blood glucose >150 then  ok to take   Yes Outside Provider   polyethylene glycol (MIRALAX) 17 g packet Take 17 g by mouth every 12 hours as needed (constipation). Stir and dissolve powder in any 4 to 8 ounces of beverage, then drink.    Yes Outside Provider   bumetanide (BUMEX) 1 MG tablet Take 1 tablet by mouth 2 times daily. 5/18/21 5/18/22 Yes Adam Shelley MD   atorvastatin (LIPITOR) 40 MG tablet Take 1 tablet by mouth daily.  Patient taking differently: Take 40 mg by mouth nightly.  5/5/21 6/4/21 Yes Ana M Dasilva MD   aspirin 81 MG EC tablet Take 1 tablet by mouth daily. Do not start before May 6, 2021. 5/6/21 6/5/21 Yes Ana M Dasilva MD   clopidogrel (PLAVIX) 75 MG tablet Take 1 tablet by mouth daily. Do not start before May 6, 2021. 5/6/21 6/5/21 Yes Ana M Dasilva MD   sacubitril-valsartan (ENTRESTO)  MG per tablet Take 1 tablet by mouth 2 times daily. 5/5/21 6/4/21 Yes Ana M Dasilva MD   spironolactone (ALDACTONE) 25 MG tablet Take 0.5 tablets by mouth daily. Do not start before May 6, 2021.  Patient taking differently: Take 25 mg by mouth daily.  5/6/21 6/5/21 Yes Ana M Dasilva MD   insulin glargine (Lantus SoloStar) 100 UNIT/ML pen-injector Inject 5 Units into the skin nightly. Prime 2 units before each dose. 5/5/21  Yes Ana M Dasilva MD   Invokana 100 MG tablet Take 100 mg by mouth daily. 3/29/21  Yes Outside Provider   carvedilol (COREG) 6.25 MG tablet Take 1 tablet by mouth every 12 hours. 3/27/21  Yes Lashaun Cameron MD   furosemide (LASIX) 40 MG tablet Take 40 mg by mouth daily.  5/31/21  Outside Provider   sitaGLIPtin (JANUVIA) 100 MG tablet Take 0.5 tablets by mouth daily. Hold januvia for first couple days at home after dc from hospital. Monitor BG at least twice day before breakfast and dinner. If blood sugar >150 then ok to take januvia 50 mg daily. 3/27/21 5/31/21  Lashaun Cameron MD        Current medications:   Current  Facility-Administered Medications   Medication Dose Route Frequency Provider Last Rate Last Admin   • sodium chloride 0.9 % flush bag 25 mL  25 mL Intravenous PRN Shaan Moreira, DO       • sodium chloride (PF) 0.9 % injection 2 mL  2 mL Intracatheter 2 times per day Shaan Moreira DO       • cefTRIAXone (ROCEPHIN) syringe 1,000 mg  1,000 mg Intravenous Once Shaan Moreira DO       • aspirin suppository 300 mg  300 mg Rectal Once Shaan Moreira DO         Current Outpatient Medications   Medication Sig Dispense Refill   • pantoprazole (PROTONIX) 20 MG tablet Take 20 mg by mouth daily.     • potassium CHLORIDE (KLOR-CON M) 20 MEQ mehdi ER tablet Take 40 mEq by mouth daily.     • ferrous sulfate 220 (44 Fe) MG/5ML liquid Take 330 mg by mouth daily. Takes 7.5 ml      • guaifenesin (HUMIBID E) 400 MG Tab Take 1,200 mg by mouth 2 times daily. Cough     • hydrALAZINE (APRESOLINE) 100 MG tablet Take 100 mg by mouth 3 times daily. HTN      • linaGLIPtin (Tradjenta) 5 MG tablet Monitor blood glucose BID before breakfast and dinner, if blood glucose >150 then ok to take     • polyethylene glycol (MIRALAX) 17 g packet Take 17 g by mouth every 12 hours as needed (constipation). Stir and dissolve powder in any 4 to 8 ounces of beverage, then drink.      • bumetanide (BUMEX) 1 MG tablet Take 1 tablet by mouth 2 times daily. 60 tablet 11   • atorvastatin (LIPITOR) 40 MG tablet Take 1 tablet by mouth daily. (Patient taking differently: Take 40 mg by mouth nightly. ) 30 tablet 0   • aspirin 81 MG EC tablet Take 1 tablet by mouth daily. Do not start before May 6, 2021. 30 tablet 0   • clopidogrel (PLAVIX) 75 MG tablet Take 1 tablet by mouth daily. Do not start before May 6, 2021. 30 tablet 0   • sacubitril-valsartan (ENTRESTO)  MG per tablet Take 1 tablet by mouth 2 times daily. 60 tablet 0   • spironolactone (ALDACTONE) 25 MG tablet Take 0.5 tablets by mouth daily. Do not start before May 6, 2021. (Patient taking differently:  Take 25 mg by mouth daily. ) 15 tablet 0   • insulin glargine (Lantus SoloStar) 100 UNIT/ML pen-injector Inject 5 Units into the skin nightly. Prime 2 units before each dose. 15 mL 12   • Invokana 100 MG tablet Take 100 mg by mouth daily.     • carvedilol (COREG) 6.25 MG tablet Take 1 tablet by mouth every 12 hours. 60 tablet 0         Allergies:  ALLERGIES:  Patient has no known allergies.    Social History:   Alcohol and Tobacco History:     Tobacco Use: Never            reports no history of drug use.    Family history:   Family History   Problem Relation Age of Onset   • Diabetes Mother    • Hypertension Mother    • Hyperlipidemia Mother         Review of systems:   Review of Systems   Constitutional:  No fever, chills, night sweats, fatigue, malaise, unintentional weight loss  HEENT/Mouth:  No headache, eye pain, vision changes, hearing changes  Cardiovascular:  No chest pain, SOB, PND, GRAMAJO, orthopnea, claudication, edema, palpitations  Respiratory:  No dyspnea, cough, sputum, wheezing  Gastrointestinal:  No nausea, vomiting, diarrhea, constipation, abdominal pain, no Hematochezia, melena  Musculoskeletal:  No arthralgias,myalgias, joint swelling, joint stiffness, back pain, neck pain  Skin:  No skin lesions, rash, wounds, pruritus  Neuro:  No weakness, numbness, paresthesias, syncope, dizziness, loss of sensation      Physical exam:     Vitals with min/max:      Vital Last Value 24 Hour Range   Temperature 96.3 °F (35.7 °C) (05/31/21 0917) Temp  Min: 96.3 °F (35.7 °C)  Max: 96.3 °F (35.7 °C)   Pulse 78 (05/31/21 1045) Pulse  Min: 71  Max: 78   Respiratory 20 (05/31/21 1045) Resp  Min: 15  Max: 22   Non-Invasive  Blood Pressure (!) 147/87 (05/31/21 1045) BP  Min: 144/108  Max: 173/149   Pulse Oximetry 98 % (05/31/21 1045) SpO2  Min: 98 %  Max: 100 %   Arterial   Blood Pressure   No data recorded        Intake/Output Summary (Last 24 hours) at 5/31/2021 1230  Last data filed at 5/31/2021 1030  Gross per 24 hour    Intake --   Output 650 ml   Net -650 ml       Physical Exam   General: Alert, cooperative, conversive, in no acute distress  Head:  Normocephalic, atraumatic.   Neck:  Trachea is midline.     Eyes:  Normal conjunctivae and sclerae.   ENT:  Mucous membranes are moist.   Cardiovascular:  Regular rate and rhythm. Systolic murmur present. No JVD. Good pulses in distal extremities.   Respiratory: Normal respiratory effort. Lungs clear to auscultation. No wheezes, rales or rhonchi.  Gastrointestinal:  Soft, nontender, nondistended.  Normal bowel sounds.   Musculoskeletal:  No deformity. No lower extremity edema.  Skin:  Warm and dry without rash.    Back:  Normal alignment.  No costovertebral angle tenderness.  Neurologic:  Oriented x2.  No focal deficits.      Data:     CBC:   Lab Results   Component Value Date    WBC 9.6 05/31/2021    RBC 4.20 05/31/2021    HGB 11.8 (L) 05/31/2021    HCT 35.5 (L) 05/31/2021    MCV 84.5 05/31/2021     05/31/2021     CMP:   Lab Results   Component Value Date    CO2 28 05/31/2021    BUN 23 (H) 05/31/2021     Mag:    Lab Results   Component Value Date    MG 2.2 05/05/2021     PT/INR:    Lab Results   Component Value Date    INR 1.1 05/31/2021     PTT:  No results found for: APTT  TSH:    Lab Results   Component Value Date    TSH 2.477 03/20/2021       Impression:     Ms. Vega is a 61-yo F who was brought to the ED for acute altered mental status and unwitnessed fall.  Cardiology consulted for troponin elevation 0.21 and ST depressions on EKG.    Diagnoses:  1. Type II NSTEMI  2. Severe mitral regurgitation   3. Ischemic cardiomyopathy  4. Coronary artery disease  5. Hypertension  6. Dyslipidemia  7. Diabetes Mellitus  8. UTI      Recommendations & Plan:     1. On review of patient's EKG, she has ST elevation in lead III with ST depressions in V4-V6, as well as findings suggestive of LVH. We repeated an EKG which showed a lesser degree of ST elevation in lead III and ST  depressions in V4-V6. She also has a troponin elevation of 0.21 which is likely due to demand ischemia in the setting of UTI and context of 90% RCA stenosis severe mitral regurgitation.  2. We will follow-up on repeat troponin. If it continues to rise, will consider starting cardiac heparin.  3. Patient would benefit from re-discussing PCI for RCA stenosis and MitraClip for severe mitral regurgitation.      Ivet Luu  Internal Medicine, PGY-1         26-Apr-2025 11:08

## 2025-04-26 NOTE — ED PROVIDER NOTE - NSICDXPASTMEDICALHX_GEN_ALL_CORE_FT
PAST MEDICAL HISTORY:  Anxiety     CAD (coronary artery disease)     Chronic back pain     Chronic pain     Constipation     Degenerative disc disease, lumbar     Dry eye of left side     Dyslipidemia     H/O corneal abrasion left    HLD (hyperlipidemia)     HTN (hypertension)     Hypothyroidism     Insomnia

## 2025-04-26 NOTE — ED ADULT NURSE NOTE - CHIEF COMPLAINT QUOTE
biba (ncpd) for complaints of abdominal pain and nauseas / vomiting for a few days.  from [facility].  patient is alert / oriented x4.  no respiratory distress.

## 2025-04-26 NOTE — ED PROVIDER NOTE - NSICDXPASTSURGICALHX_GEN_ALL_CORE_FT
PAST SURGICAL HISTORY:  H/O sinus surgery     History of back surgery     History of cosmetic surgery     S/P appendectomy     S/P breast augmentation 29 ya, reconstruction 2015    S/P eye surgery 49 ya, blepharoplasty    S/P hysterectomy age 39    S/P hysterectomy     S/P sinus surgery 19 ya    S/P tendon repair left hand, 2015

## 2025-04-26 NOTE — ED PROVIDER NOTE - PATIENT PORTAL LINK FT
You can access the FollowMyHealth Patient Portal offered by [facility] by registering at the following website: [email or web address]. By joining [facility]’s FollowMyHealth portal, you will also be able to view your health information using other applications (apps) compatible with our system.

## 2025-04-26 NOTE — ED PROVIDER NOTE - PROGRESS NOTE DETAILS
Discussed results with the patient and provided copies.  All questions were answered. Discussed the importance of prompt, close medical follow-up. Patient will return with any changes, concerns or persistent/worsening symptoms.  Patient verbalized understanding. post void 900 cc. advised rn to place downey

## 2025-04-28 ENCOUNTER — INPATIENT (INPATIENT)
Facility: HOSPITAL | Age: 85
LOS: 3 days | Discharge: HOME HEALTH | DRG: 690 | End: 2025-05-02
Attending: FAMILY MEDICINE | Admitting: FAMILY MEDICINE
Payer: MEDICARE

## 2025-04-28 VITALS
WEIGHT: 149.91 LBS | TEMPERATURE: 99 F | HEART RATE: 103 BPM | DIASTOLIC BLOOD PRESSURE: 63 MMHG | HEIGHT: 61 IN | SYSTOLIC BLOOD PRESSURE: 107 MMHG | OXYGEN SATURATION: 96 % | RESPIRATION RATE: 17 BRPM

## 2025-04-28 DIAGNOSIS — Z98.890 OTHER SPECIFIED POSTPROCEDURAL STATES: Chronic | ICD-10-CM

## 2025-04-28 DIAGNOSIS — Z98.89 OTHER SPECIFIED POSTPROCEDURAL STATES: Chronic | ICD-10-CM

## 2025-04-28 DIAGNOSIS — Z90.710 ACQUIRED ABSENCE OF BOTH CERVIX AND UTERUS: Chronic | ICD-10-CM

## 2025-04-28 DIAGNOSIS — Z90.49 ACQUIRED ABSENCE OF OTHER SPECIFIED PARTS OF DIGESTIVE TRACT: Chronic | ICD-10-CM

## 2025-04-28 DIAGNOSIS — Z98.82 BREAST IMPLANT STATUS: Chronic | ICD-10-CM

## 2025-04-28 DIAGNOSIS — N12 TUBULO-INTERSTITIAL NEPHRITIS, NOT SPECIFIED AS ACUTE OR CHRONIC: ICD-10-CM

## 2025-04-28 LAB
ALBUMIN SERPL ELPH-MCNC: 3.9 G/DL — SIGNIFICANT CHANGE UP (ref 3.3–5)
ALP SERPL-CCNC: 66 U/L — SIGNIFICANT CHANGE UP (ref 40–120)
ALT FLD-CCNC: 14 U/L — SIGNIFICANT CHANGE UP (ref 10–45)
ANION GAP SERPL CALC-SCNC: 17 MMOL/L — SIGNIFICANT CHANGE UP (ref 5–17)
APPEARANCE UR: ABNORMAL
APTT BLD: 26.4 SEC — SIGNIFICANT CHANGE UP (ref 26.1–36.8)
AST SERPL-CCNC: 35 U/L — SIGNIFICANT CHANGE UP (ref 10–40)
BACTERIA # UR AUTO: NEGATIVE /HPF — SIGNIFICANT CHANGE UP
BASOPHILS # BLD AUTO: 0.05 K/UL — SIGNIFICANT CHANGE UP (ref 0–0.2)
BASOPHILS NFR BLD AUTO: 0.6 % — SIGNIFICANT CHANGE UP (ref 0–2)
BILIRUB SERPL-MCNC: 0.3 MG/DL — SIGNIFICANT CHANGE UP (ref 0.2–1.2)
BILIRUB UR-MCNC: ABNORMAL
BUN SERPL-MCNC: 19 MG/DL — SIGNIFICANT CHANGE UP (ref 7–23)
CALCIUM SERPL-MCNC: 9.1 MG/DL — SIGNIFICANT CHANGE UP (ref 8.4–10.5)
CAST: 8 /LPF — HIGH (ref 0–4)
CHLORIDE SERPL-SCNC: 97 MMOL/L — SIGNIFICANT CHANGE UP (ref 96–108)
CO2 SERPL-SCNC: 15 MMOL/L — LOW (ref 22–31)
COLOR SPEC: SIGNIFICANT CHANGE UP
CREAT SERPL-MCNC: 0.92 MG/DL — SIGNIFICANT CHANGE UP (ref 0.5–1.3)
DIFF PNL FLD: ABNORMAL
EGFR: 61 ML/MIN/1.73M2 — SIGNIFICANT CHANGE UP
EGFR: 61 ML/MIN/1.73M2 — SIGNIFICANT CHANGE UP
EOSINOPHIL # BLD AUTO: 0.13 K/UL — SIGNIFICANT CHANGE UP (ref 0–0.5)
EOSINOPHIL NFR BLD AUTO: 1.6 % — SIGNIFICANT CHANGE UP (ref 0–6)
GAS PNL BLDV: SIGNIFICANT CHANGE UP
GLUCOSE SERPL-MCNC: 117 MG/DL — HIGH (ref 70–99)
GLUCOSE UR QL: NEGATIVE MG/DL — SIGNIFICANT CHANGE UP
HCT VFR BLD CALC: 40.1 % — SIGNIFICANT CHANGE UP (ref 34.5–45)
HGB BLD-MCNC: 12.9 G/DL — SIGNIFICANT CHANGE UP (ref 11.5–15.5)
IMM GRANULOCYTES NFR BLD AUTO: 0.7 % — SIGNIFICANT CHANGE UP (ref 0–0.9)
INR BLD: 1.04 RATIO — SIGNIFICANT CHANGE UP (ref 0.85–1.16)
KETONES UR-MCNC: ABNORMAL MG/DL
LEUKOCYTE ESTERASE UR-ACNC: ABNORMAL
LYMPHOCYTES # BLD AUTO: 1.63 K/UL — SIGNIFICANT CHANGE UP (ref 1–3.3)
LYMPHOCYTES # BLD AUTO: 19.6 % — SIGNIFICANT CHANGE UP (ref 13–44)
MCHC RBC-ENTMCNC: 27.9 PG — SIGNIFICANT CHANGE UP (ref 27–34)
MCHC RBC-ENTMCNC: 32.2 G/DL — SIGNIFICANT CHANGE UP (ref 32–36)
MCV RBC AUTO: 86.6 FL — SIGNIFICANT CHANGE UP (ref 80–100)
MONOCYTES # BLD AUTO: 1.08 K/UL — HIGH (ref 0–0.9)
MONOCYTES NFR BLD AUTO: 13 % — SIGNIFICANT CHANGE UP (ref 2–14)
NEUTROPHILS # BLD AUTO: 5.36 K/UL — SIGNIFICANT CHANGE UP (ref 1.8–7.4)
NEUTROPHILS NFR BLD AUTO: 64.5 % — SIGNIFICANT CHANGE UP (ref 43–77)
NITRITE UR-MCNC: NEGATIVE — SIGNIFICANT CHANGE UP
NRBC BLD AUTO-RTO: 0 /100 WBCS — SIGNIFICANT CHANGE UP (ref 0–0)
PH UR: 5.5 — SIGNIFICANT CHANGE UP (ref 5–8)
PLATELET # BLD AUTO: 303 K/UL — SIGNIFICANT CHANGE UP (ref 150–400)
POTASSIUM SERPL-MCNC: 4.9 MMOL/L — SIGNIFICANT CHANGE UP (ref 3.5–5.3)
POTASSIUM SERPL-SCNC: 4.9 MMOL/L — SIGNIFICANT CHANGE UP (ref 3.5–5.3)
PROT SERPL-MCNC: 7 G/DL — SIGNIFICANT CHANGE UP (ref 6–8.3)
PROT UR-MCNC: 100 MG/DL
PROTHROM AB SERPL-ACNC: 11.8 SEC — SIGNIFICANT CHANGE UP (ref 9.9–13.4)
RBC # BLD: 4.63 M/UL — SIGNIFICANT CHANGE UP (ref 3.8–5.2)
RBC # FLD: 15.9 % — HIGH (ref 10.3–14.5)
RBC CASTS # UR COMP ASSIST: 130 /HPF — HIGH (ref 0–4)
REVIEW: SIGNIFICANT CHANGE UP
SODIUM SERPL-SCNC: 129 MMOL/L — LOW (ref 135–145)
SP GR SPEC: 1.02 — SIGNIFICANT CHANGE UP (ref 1–1.03)
SQUAMOUS # UR AUTO: 6 /HPF — HIGH (ref 0–5)
UROBILINOGEN FLD QL: 1 MG/DL — SIGNIFICANT CHANGE UP (ref 0.2–1)
WBC # BLD: 8.31 K/UL — SIGNIFICANT CHANGE UP (ref 3.8–10.5)
WBC # FLD AUTO: 8.31 K/UL — SIGNIFICANT CHANGE UP (ref 3.8–10.5)
WBC UR QL: 25 /HPF — HIGH (ref 0–5)

## 2025-04-28 PROCEDURE — 71045 X-RAY EXAM CHEST 1 VIEW: CPT | Mod: 26

## 2025-04-28 PROCEDURE — 99285 EMERGENCY DEPT VISIT HI MDM: CPT | Mod: GC

## 2025-04-28 RX ORDER — DULOXETINE 20 MG/1
1 CAPSULE, DELAYED RELEASE ORAL
Refills: 0 | DISCHARGE

## 2025-04-28 RX ORDER — TRAZODONE HCL 100 MG
0 TABLET ORAL
Refills: 0 | DISCHARGE

## 2025-04-28 RX ORDER — ROSUVASTATIN CALCIUM 20 MG/1
5 TABLET, FILM COATED ORAL AT BEDTIME
Refills: 0 | Status: DISCONTINUED | OUTPATIENT
Start: 2025-04-28 | End: 2025-05-02

## 2025-04-28 RX ORDER — ACETAMINOPHEN 500 MG/5ML
650 LIQUID (ML) ORAL ONCE
Refills: 0 | Status: COMPLETED | OUTPATIENT
Start: 2025-04-28 | End: 2025-04-28

## 2025-04-28 RX ORDER — OXYBUTYNIN CHLORIDE 5 MG/1
5 TABLET, FILM COATED, EXTENDED RELEASE ORAL DAILY
Refills: 0 | Status: DISCONTINUED | OUTPATIENT
Start: 2025-04-28 | End: 2025-05-02

## 2025-04-28 RX ORDER — CEFTRIAXONE 500 MG/1
1000 INJECTION, POWDER, FOR SOLUTION INTRAMUSCULAR; INTRAVENOUS EVERY 24 HOURS
Refills: 0 | Status: DISCONTINUED | OUTPATIENT
Start: 2025-04-28 | End: 2025-04-30

## 2025-04-28 RX ORDER — ONDANSETRON HCL/PF 4 MG/2 ML
1 VIAL (ML) INJECTION
Refills: 0 | DISCHARGE

## 2025-04-28 RX ORDER — BUDESONIDE 0.25 MG/2ML
2 SUSPENSION RESPIRATORY (INHALATION)
Refills: 0 | DISCHARGE

## 2025-04-28 RX ORDER — POLYETHYLENE GLYCOL 3350 17 G/17G
17 POWDER, FOR SOLUTION ORAL
Refills: 0 | DISCHARGE

## 2025-04-28 RX ORDER — GABAPENTIN 400 MG/1
300 CAPSULE ORAL AT BEDTIME
Refills: 0 | Status: DISCONTINUED | OUTPATIENT
Start: 2025-04-28 | End: 2025-04-28

## 2025-04-28 RX ORDER — BISACODYL 5 MG
2 TABLET, DELAYED RELEASE (ENTERIC COATED) ORAL
Refills: 0 | DISCHARGE

## 2025-04-28 RX ORDER — LEVOTHYROXINE SODIUM 300 MCG
75 TABLET ORAL DAILY
Refills: 0 | Status: DISCONTINUED | OUTPATIENT
Start: 2025-04-28 | End: 2025-05-02

## 2025-04-28 RX ORDER — LOSARTAN POTASSIUM 100 MG/1
50 TABLET, FILM COATED ORAL DAILY
Refills: 0 | Status: DISCONTINUED | OUTPATIENT
Start: 2025-04-28 | End: 2025-04-28

## 2025-04-28 RX ORDER — ESCITALOPRAM OXALATE 20 MG/1
20 TABLET ORAL DAILY
Refills: 0 | Status: DISCONTINUED | OUTPATIENT
Start: 2025-04-28 | End: 2025-05-02

## 2025-04-28 RX ORDER — MELATONIN 5 MG
2 TABLET ORAL
Refills: 0 | DISCHARGE

## 2025-04-28 RX ORDER — HEPARIN SODIUM 1000 [USP'U]/ML
5000 INJECTION INTRAVENOUS; SUBCUTANEOUS EVERY 12 HOURS
Refills: 0 | Status: DISCONTINUED | OUTPATIENT
Start: 2025-04-28 | End: 2025-05-02

## 2025-04-28 RX ORDER — TRAZODONE HCL 100 MG
1 TABLET ORAL
Refills: 0 | DISCHARGE

## 2025-04-28 RX ORDER — ERYTHROMYCIN 5 MG/G
1 OINTMENT OPHTHALMIC
Refills: 0 | DISCHARGE

## 2025-04-28 RX ORDER — ASPIRIN 325 MG
81 TABLET ORAL DAILY
Refills: 0 | Status: DISCONTINUED | OUTPATIENT
Start: 2025-04-28 | End: 2025-05-02

## 2025-04-28 RX ORDER — POLYETHYLENE GLYCOL 3350 17 G/17G
17 POWDER, FOR SOLUTION ORAL DAILY
Refills: 0 | Status: DISCONTINUED | OUTPATIENT
Start: 2025-04-28 | End: 2025-05-02

## 2025-04-28 RX ORDER — SENNA 187 MG
2 TABLET ORAL AT BEDTIME
Refills: 0 | Status: DISCONTINUED | OUTPATIENT
Start: 2025-04-28 | End: 2025-05-02

## 2025-04-28 RX ORDER — ACETAMINOPHEN 500 MG/5ML
1 LIQUID (ML) ORAL
Refills: 0 | DISCHARGE

## 2025-04-28 RX ORDER — ROSUVASTATIN CALCIUM 20 MG/1
1 TABLET, FILM COATED ORAL
Refills: 0 | DISCHARGE

## 2025-04-28 RX ORDER — HYPROMELLOSE 0.4 %
1 DROPS OPHTHALMIC (EYE)
Refills: 0 | DISCHARGE

## 2025-04-28 RX ORDER — LINACLOTIDE 290 UG/1
1 CAPSULE, GELATIN COATED ORAL
Refills: 0 | DISCHARGE

## 2025-04-28 RX ORDER — HYDROCORTISONE 10 MG/G
1 CREAM TOPICAL
Refills: 0 | DISCHARGE

## 2025-04-28 RX ORDER — CLOPIDOGREL BISULFATE 75 MG/1
1 TABLET, FILM COATED ORAL
Refills: 0 | DISCHARGE

## 2025-04-28 RX ORDER — DONEPEZIL HYDROCHLORIDE 5 MG/1
5 TABLET ORAL AT BEDTIME
Refills: 0 | Status: DISCONTINUED | OUTPATIENT
Start: 2025-04-28 | End: 2025-05-02

## 2025-04-28 RX ORDER — ACETAMINOPHEN 500 MG/5ML
0 LIQUID (ML) ORAL
Refills: 0 | DISCHARGE

## 2025-04-28 RX ORDER — CEFTRIAXONE 500 MG/1
1000 INJECTION, POWDER, FOR SOLUTION INTRAMUSCULAR; INTRAVENOUS ONCE
Refills: 0 | Status: COMPLETED | OUTPATIENT
Start: 2025-04-28 | End: 2025-04-28

## 2025-04-28 RX ADMIN — ROSUVASTATIN CALCIUM 5 MILLIGRAM(S): 20 TABLET, FILM COATED ORAL at 21:19

## 2025-04-28 RX ADMIN — Medication 650 MILLIGRAM(S): at 18:06

## 2025-04-28 RX ADMIN — HEPARIN SODIUM 5000 UNIT(S): 1000 INJECTION INTRAVENOUS; SUBCUTANEOUS at 21:20

## 2025-04-28 RX ADMIN — CEFTRIAXONE 100 MILLIGRAM(S): 500 INJECTION, POWDER, FOR SOLUTION INTRAMUSCULAR; INTRAVENOUS at 14:49

## 2025-04-28 RX ADMIN — Medication 1000 MILLILITER(S): at 14:50

## 2025-04-28 RX ADMIN — DONEPEZIL HYDROCHLORIDE 5 MILLIGRAM(S): 5 TABLET ORAL at 21:19

## 2025-04-28 RX ADMIN — Medication 2 TABLET(S): at 21:19

## 2025-04-28 NOTE — H&P ADULT - ASSESSMENT
85-year-old woman       h/o   CAD,   HTN/  HLD,  Hypothyroidism /  ILR.  spine surg,  breast implants       arrives   from [place], for urinary retention and abdominal pain. dizziness.  confusion .   seen at [place] 2 days ago,  had uti/ urinary retention.  Smith was placed and   d/c  on Bactrim.      History of tummy tuck, hysterectomy, appendectomy   CT scan   4/26 ,  distended bladder /  referred to   er,   by Dr. Harrell her primary care.     pt  arrive s  from the  [place] .  with abd pain/  from distended  bladder      /63, tachycardic to 103, afebrile.    uti,   on iv rocephin       follow  ucx      CT a/p, distended  bladder   CAD   TAVR    HTN/  HLD   dvt  ppx   dr shay  will  assume  acre          85-year-old woman       h/o   CAD,   HTN/  HLD,  Hypothyroidism /  ILR.  spine surg,  breast implants       arrives   from [place], for urinary retention and abdominal pain. dizziness.  confusion .   seen at [place] 2 days ago,  had uti/ urinary retention.  Downey was placed and   d/c  on Bactrim.      History of tummy tuck, hysterectomy, appendectomy   CT scan   4/26 ,  distended bladder /  referred to   er,   by Dr. Harrell her primary care.     pt  arrive s  from the  [place] .  with abd pain/  from distended  bladder      /63, tachycardic to 103, afebrile.    uti,   on iv rocephin       follow  ucx/ cook s  downey      CT a/p, distended  bladder  Hyponatremia, on iv  fluids   CAD   TAVR    HTN/  HLD   dvt  ppx   dr shay  will  assume    care          85-year-old woman       h/o   CAD,   HTN/  HLD,  Hypothyroidism /  ILR.  spine surg,  breast implants /  mlple cosmetic surg .  gace' eye lids      arrives   from [place], for urinary retention and abdominal pain. dizziness.  confusion .   seen at [place] 2 days ago,  had uti/ urinary retention.  Downey was placed and   d/c  on Bactrim.      History of tummy tuck, hysterectomy, appendectomy   CT scan   4/26 ,  distended bladder /  referred to   er,   by Dr. Harrell her primary care.     pt  arrive s  from the  [facility] .  with abd pain/  from distended  bladder /  has downey, seen in er     /63, tachycardic to 103, afebrile.    uti,   on iv rocephin       follow  ucx/ cook s  downey      CT a/p, distended  bladder  Hyponatremia, on iv  fluids   CAD   TAVR    HTN/  HLD   dvt  ppx   dr shay  will  assume    care          85-year-old woman       h/o   CAD,   HTN/  HLD,  Hypothyroidism /  ILR.  spine surg,  breast implants /  mlple cosmetic surg .  gace' eye lids/  c/c redness  face/ feet      arrives   from [place], for urinary retention and abdominal pain. dizziness.  confusion .   seen at [facility] 2 days ago,  had uti/ urinary retention.  Downey was placed and   d/c  on Bactrim.      History of tummy tuck, hysterectomy, appendectomy   CT scan   4/26 ,  distended bladder /  referred to   er,   by Dr. Harrell her primary care.     pt  arrive s  from the  [place] .  with abd pain/  from distended  bladder /  has downey, seen in er     /63, tachycardic to 103, afebrile.    uti,   on iv rocephin       follow  ucx/ cook s  downey      CT a/p, distended  bladder  Hyponatremia, on iv  fluids   CAD   TAVR    HTN/  HLD   dvt  ppx   dr shay  will  assume    care

## 2025-04-28 NOTE — ED ADULT TRIAGE NOTE - TEMPERATURE IN CELSIUS (DEGREES C)
Emergency Department Provider Note       PCP: No, Pcp   Age: 22 y.o.   Sex: male     DISPOSITION Decision To Discharge 09/05/2024 02:00:25 PM  Condition at Disposition: Good       ICD-10-CM    1. Possible exposure to STD  Z20.2           Medical Decision Making     Instruments is a well-appearing 22-year-old male who arrives for concerns of STDs.  He reports associated symptoms noted in HPI for couple months intermittently.  Exam is normal.  No scrotal pain or swelling reported.  No gross penile pain is reported except on urination intermittently.  No rashes reported.  Will empirically treat for STIs.  Will send prescription for doxycycline.  Will have him follow-up with primary care physician for recheck and to receive further recommendations.  Very strict return precautions were discussed with patient which she verbalized understanding.  ED Course as of 09/05/24 1403   Thu Sep 05, 2024   1357 Urinalysis negative for infection at this time. [TC]      ED Course User Index  [TC] Riki Mckeon APRN - NP     1 acute, uncomplicated illness or injury.  Prescription drug management performed.  Patient was discharged risks and benefits of hospitalization were considered.  Shared medical decision making was utilized in creating the patients health plan today.  I independently ordered and reviewed each unique test.    I reviewed external records: ED visit note from an outside group.  I reviewed external records: provider visit note from PCP.    history provided by patient.    I interpreted the labs.              History     So we will appear nontoxic 22-year-old male arriving for complaints of penile discharge with some associated dysuria that have been present for the past since today but is reported that he has had this intermittently since last March.  States he is having some yellowish like discharge.  He denies any scrotal pain or swelling at this time.  He denies any hematuria.  No groin/abdominal/flank pain is 
37.1

## 2025-04-28 NOTE — ED PROVIDER NOTE - PHYSICAL EXAMINATION
GENERAL: Awake, alert, NAD  HEENT: dry mucous mebranes  LUNGS: CTAB, no wheezes or crackles   CARDIAC: RRR, no m/r/g  ABDOMEN:   BACK: No midline spinal tenderness, no CVA tenderness  EXT: No edema, no calf tenderness, 2+ DP pulses bilaterally, no deformities.  NEURO: A&Ox3. Moving all extremities.  SKIN: Warm and dry. No rash.  PSYCH: Normal affect. GENERAL: Awake, alert, NAD  HEENT: dry mucous mebranes  LUNGS: CTAB, no wheezes or crackles   CARDIAC: RRR, no m/r/g  ABDOMEN: mild distention  BACK: R CVA tenderness  EXT: No edema, no calf tenderness  NEURO: A&Ox3. Moving all extremities.  SKIN: Warm and dry. No rash.  PSYCH: Normal affect.

## 2025-04-28 NOTE — ED PROVIDER NOTE - ATTENDING CONTRIBUTION TO CARE
Dr. Persaud: I have personally performed a face to face bedside history and physical examination of this patient. I have discussed the history, examination, review of systems, assessment and plan of management with the resident. I have reviewed the electronic medical record and amended it to reflect my history, review of systems, physical exam, assessment and plan    Dr. Persaud: 85-year-old female history of CAD, hypertension, hyperlipidemia, hypothyroidism, on Plavix, seen at [facility] 2 days ago and diagnosed with UTI, discharged home on Bactrim, had a Smith placed for urinary retention, sent by PMD from assisted living to be admitted to Dr. Pyle for dehydration and IV antibiotics.  Patient also complaining of subjective fevers and chills, no nausea vomiting, states has stable abdominal pain which is not worse.  Patient had  CT performed 2 days ago which was unremarkable except for distended bladder.  Additional history obtained from patient's son over the phone as well as paperwork from the facility that patient was sent from.    Gen: No acute distress  HEENT: Mucous membranes minimally dry, pink conjunctivae, EOMI  CV: RRR, no clubbing/cyanosis/edema  Resp: CTAB  GI: Abdomen soft, NT, ND. Normal BS. No rebound, no guarding  : Right CVA tenderness to palpation  Neuro: A&O x 3, moving all 4 extremities  MSK: No spine or joint tenderness to palpation  Skin: No rashes    Patient presenting with pyelonephritis, unsure of allergy to penicillin, given minimal cross-reactivity will give ceftriaxone, noted to be hyponatremic as well, likely due to hypovolemia, will provide IV hydration, and will admit.

## 2025-04-28 NOTE — PATIENT PROFILE ADULT - NSPROPTRIGHTBILLOFRIGHTS_GEN_A_NUR
Anesthesia POST Procedure Evaluation    Patient: Natalie Villa   MRN:     [id number] Gender:   female   Age:    29 year old :      1991        Preoperative Diagnosis: Crohn's disease of colon without complication (H) [K50.10]   Procedure(s):  COLONOSCOPY, WITH POLYPECTOMY AND BIOPSY   Postop Comments: No value filed.     Anesthesia Type: MAC       Disposition: Outpatient   Postop Pain Control: Uneventful            Sign Out: Well controlled pain   PONV: No   Neuro/Psych: Uneventful            Sign Out: Acceptable/Baseline neuro status   Airway/Respiratory: Uneventful            Sign Out: Acceptable/Baseline resp. status   CV/Hemodynamics: Uneventful            Sign Out: Acceptable CV status   Other NRE: NONE   DID A NON-ROUTINE EVENT OCCUR? No         Last Anesthesia Record Vitals:  CRNA VITALS  2020 1417 - 2020 1513      2020             Pulse:  74    SpO2:  100 %          Last PACU Vitals:  Vitals Value Taken Time   /79 20 1450   Temp 36.2  C (97.2  F) 20 1450   Pulse     Resp 16 20 1450   SpO2 100 % 20 1450   Temp src     NIBP     Pulse     SpO2     Resp     Temp     Ht Rate     Temp 2           Electronically Signed By: Marlo Cannon DO, 2020, 3:13 PM  
patient

## 2025-04-28 NOTE — PATIENT PROFILE ADULT - FALL HARM RISK - HARM RISK INTERVENTIONS

## 2025-04-28 NOTE — CONSULT NOTE ADULT - ASSESSMENT
85-year-old female history of CAD, hypertension, hyperlipidemia, hypothyroidism, on Plavix, seen at [facility] 2 days ago and diagnosed with UTI, discharged home on Bactrim, had a Smith placed for urinary retention, sent by PMD from assisted living to be admitted to Dr. Pyle for dehydration and IV antibiotics.  Patient also complaining of subjective fevers and chills, no nausea vomiting, states has stable abdominal pain which is not worse.  Patient had  CT performed 2 days ago which was unremarkable except for distended bladder.  Additional history obtained from patient's son over the phone as well as paperwork from the facility that patient was sent from.  pt with sig cardiac and medical hx with hx of CAD, s/p stent, last cath 2 years ago negative.  last echo wuth mod to severe AS  repeat echo to evaluate AV  avoid hypotension  dvt prophylaxis  asa daily  lipid panel  abx

## 2025-04-28 NOTE — H&P ADULT - NSHPPHYSICALEXAM_GEN_ALL_CORE
T(C): 37.1 (04-28-25 @ 12:36), Max: 37.1 (04-28-25 @ 12:36)  HR: 103 (04-28-25 @ 12:36) (103 - 103)  BP: 107/63 (04-28-25 @ 12:36) (107/63 - 107/63)  RR: 17 (04-28-25 @ 12:36) (17 - 17)  SpO2: 96% (04-28-25 @ 12:36) (96% - 96%)  GENERAL: NAD, lying in bed comfortably  HEAD:  Atraumatic, normocephalic  EYES: EOMI, PERRLA, conjunctiva and sclera clear  NECK: Supple, trachea midline, no JVD  HEART: Regular rate and rhythm, no murmurs, rubs, or gallops  LUNGS: Unlabored respirations.  Clear to auscultation bilaterally, no crackles, wheezing, or rhonchi  ABDOMEN: Soft, nontender, nondistended, +BS  EXTREMITIES: 2+ peripheral pulses bilaterally. No clubbing, cyanosis, or edema  NERVOUS SYSTEM:    dementia  SKIN: No rashes or lesions T(C): 37.1 (04-28-25 @ 12:36), Max: 37.1 (04-28-25 @ 12:36)  HR: 103 (04-28-25 @ 12:36) (103 - 103)  BP: 107/63 (04-28-25 @ 12:36) (107/63 - 107/63)  RR: 17 (04-28-25 @ 12:36) (17 - 17)  SpO2: 96% (04-28-25 @ 12:36) (96% - 96%)  GENERAL: NAD, lying in bed comfortably  HEAD:  Atraumatic, normocephalic  EYES: EOMI, PERRLA, conjunctiva and sclera clear  NECK: Supple, trachea midline, no JVD  HEART: Regular rate and rhythm, no murmurs, rubs, or gallops  LUNGS: Unlabored respirations.  Clear to auscultation bilaterally, no crackles, wheezing, or rhonchi  ABDOMEN: Soft, nontender, nondistended, +BS  EXTREMITIES: 2+ peripheral pulses bilaterally. No clubbing, cyanosis, or edema  NERVOUS SYSTEM:      alert  SKIN: No rashes or lesions

## 2025-04-28 NOTE — H&P ADULT - HISTORY OF PRESENT ILLNESS
85-year-old woman       h/o   CAD,   HTN/  HLD,  Hypothyroidism       arrives   from [place], for urinary retention and abdominal pain. dizziness.  confusion .   seen at [facility] 2 days ago,  had uti/ urinary retention.  Smith was placed and   d/c  on Bactrim.      History of tummy tuck, hysterectomy, appendectomy   CT scan   4/26 ,  distended bladder    referred to   er,   by Dr. Harrell her primary care.     /63, tachycardic to 103, afebrile.

## 2025-04-28 NOTE — ED ADULT NURSE NOTE - OBJECTIVE STATEMENT
Pt c/o "bladder pressure and small amount of urine output despite downey cath in place from [facility] 3 days ago" Pt c/o "bladder pressure and small amount of urine output despite downey cath in place from [facility] 3 days ago"  Small amt of yellow urine in leg bag, Pt states "leg bag was changed this am.

## 2025-04-28 NOTE — PATIENT PROFILE ADULT - NSPROPOAURINARYCATHETER_GEN_A_NUR
IMPROVE VTE Individual Risk Assessment          RISK                                                          Points  [  ] Previous VTE                                                3  [  ] Thrombophilia                                             2  [  ] Lower limb paralysis                                   2        (unable to hold up >15 seconds)    [  ] Current Cancer                                             2         (within 6 months)  [ x ] Immobilization > 24 hrs                              1  [  ] ICU/CCU stay > 24 hours                             1  [ x ] Age > 60                                                         1    IMPROVE VTE Score: 2  Will hold off from DVT chemoppx in setting of hematuria no

## 2025-04-28 NOTE — ED PROVIDER NOTE - CARE PLAN
Principal Discharge DX:	Urinary tract infection   1 Principal Discharge DX:	Pyelonephritis  Secondary Diagnosis:	Hyponatremia

## 2025-04-28 NOTE — PATIENT PROFILE ADULT - IS PATIENT POST-MENOPAUSAL?
Pt called in regards to making an appt, called him back and left him a vm with our call back number
yes

## 2025-04-28 NOTE — H&P ADULT - NSHPREVIEWOFSYSTEMS_GEN_ALL_CORE
REVIEW OF SYSTEMS:  CONSTITUTIONAL: No fever,  no  weight loss  ENT:  No  tinnitus,   no   vertigo  NECK: No pain or stiffness  RESPIRATORY: No cough, wheezing, chills or hemoptysis;    No Shortness of Breath  CARDIOVASCULAR: No chest pain, palpitations, dizziness  GASTROINTESTINAL:   had  abdominal   pain. No nausea, vomiting, or hematemesis; No diarrhea  No melena or hematochezia.  GENITOURINARY: No dysuria, frequency, hematuria, or incontinence  NEUROLOGICAL: No headaches  SKIN: No itching,  no   rash  LYMPH Nodes: No enlarged glands  ENDOCRINE: No heat or cold intolerance  MUSCULOSKELETAL: No joint pain or swelling  PSYCHIATRIC: No depression, anxiety  HEME/LYMPH: No easy bruising, or bleeding gums  ALLERGY AND IMMUNOLOGIC: No hives or eczema

## 2025-04-28 NOTE — H&P ADULT - NSHPLABSRESULTS_GEN_ALL_CORE
LABS:                        12.9   8.31  )-----------( 303      ( 28 Apr 2025 14:15 )             40.1     04-28    129[L]  |  97  |  19  ----------------------------<  117[H]  4.9   |  15[L]  |  0.92    Ca    9.1      28 Apr 2025 14:15    TPro  7.0  /  Alb  3.9  /  TBili  0.3  /  DBili  x   /  AST  35  /  ALT  14  /  AlkPhos  66  04-28    PT/INR - ( 28 Apr 2025 14:15 )   PT: 11.8 sec;   INR: 1.04 ratio         PTT - ( 28 Apr 2025 14:15 )  PTT:26.4 sec      Urinalysis Basic - ( 28 Apr 2025 14:15 )    Color: x / Appearance: x / SG: x / pH: x  Gluc: 117 mg/dL / Ketone: x  / Bili: x / Urobili: x   Blood: x / Protein: x / Nitrite: x   Leuk Esterase: x / RBC: x / WBC x   Sq Epi: x / Non Sq Epi: x / Bacteria: x          04-28 @ 14:05  4.8  125

## 2025-04-28 NOTE — ED ADULT NURSE NOTE - NSFALLHARMRISKINTERV_ED_ALL_ED

## 2025-04-28 NOTE — ED PROVIDER NOTE - CLINICAL SUMMARY MEDICAL DECISION MAKING FREE TEXT BOX
85-year-old woman history of CAD, hypertension, hyperlipidemia, hypothyroidism presenting to the ED from [place] for urinary retention and abdominal pain.  Patient was seen at [facility] 2 days ago where she was discovered to have a UTI with significant urinary retention.  Smith was placed and she was discharged on Bactrim.  History of tummy tuck, hysterectomy, appendectomy had CT scan done on 4/26 which revealed no SBO, distended bladder.  Spoke with son on the phone for collateral.  Lives at [facility] and staff reported some dizziness and confusion last night.  Was referred to come to the emergency department by Dr. Harrell her primary care.  On exam patient is well-appearing, /63, tachycardic to 103, afebrile.  Abdomen is firm to palpation.  Minimal yellow urine in Smith bag which she reports was changed this morning.  Bladder scan with no residual volume.  Right CVA tenderness.  Low concern for SBO as patient had CT 2 days ago with no evidence of SBO, passing gas.   concern fo for pyelonephritis.  Will obtain labs, cultures, admit to Dr. Hennessy

## 2025-04-28 NOTE — CONSULT NOTE ADULT - SUBJECTIVE AND OBJECTIVE BOX
Date of Service, 04-28-25 @ 18:14  CHIEF COMPLAINT:Patient is a 85y old  Female who presents with a chief complaint of abd  pain/  uti (28 Apr 2025 15:37)      HPI:  85-year-old female history of CAD, hypertension, hyperlipidemia, hypothyroidism, on Plavix, seen at [facility] 2 days ago and diagnosed with UTI, discharged home on Bactrim, had a Smith placed for urinary retention, sent by PMD from assisted living to be admitted to Dr. Pyle for dehydration and IV antibiotics.  Patient also complaining of subjective fevers and chills, no nausea vomiting, states has stable abdominal pain which is not worse.  Patient had  CT performed 2 days ago which was unremarkable except for distended bladder.  Additional history obtained from patient's son over the phone as well as paperwork from the facility that patient was sent from.      PAST MEDICAL & SURGICAL HISTORY:  Degenerative disc disease, lumbar  Dyslipidemia  Constipation  Chronic pain  Dry eye of left side  Hypothyroidism  Insomnia  H/O corneal abrasion  left  CAD (coronary artery disease)  Chronic back pain  Anxiety  HTN (hypertension)  HLD (hyperlipidemia)  S/P sinus surgery  19 ya  S/P breast augmentation  29 ya, reconstruction 2015  S/P tendon repair  left hand, 2015  S/P hysterectomy  age 39  S/P eye surgery  49 ya, blepharoplasty  S/P hysterectomy  S/P appendectomy  History of cosmetic surgery  H/O sinus surgery  History of back surgery      MEDICATIONS  (STANDING):  aspirin enteric coated 81 milliGRAM(s) Oral daily  cefTRIAXone   IVPB 1000 milliGRAM(s) IV Intermittent every 24 hours  donepezil 5 milliGRAM(s) Oral at bedtime  escitalopram 20 milliGRAM(s) Oral daily  heparin   Injectable 5000 Unit(s) SubCutaneous every 12 hours  levothyroxine 75 MICROGram(s) Oral daily  oxybutynin 5 milliGRAM(s) Oral daily  rosuvastatin 5 milliGRAM(s) Oral at bedtime  senna 2 Tablet(s) Oral at bedtime  sodium chloride 0.9%. 1000 milliLiter(s) (60 mL/Hr) IV Continuous <Continuous>    MEDICATIONS  (PRN):      FAMILY HISTORY:  Family history of Alzheimer's disease (Father, Mother)    Family history of diabetes mellitus (Mother)    Family history of skin cancer (Father, Mother)    Family history of Alzheimer's disease (Father, Mother)    Family history of diabetes mellitus in mother (Mother)        SOCIAL HISTORY:    [x ] Non-smoker  [ ] Smoker  [ ] Alcohol    Allergies    latex (Urticaria; Rash; Hives)  Nuts (Hives)  aspirin (Unknown)  nitrofurantoin (Swelling)  aspirin (Other)  chocolate (Other; Hives)  latex (Hives)  shellfish (Hives)  shellfish (Anaphylaxis; Angioedema)    Intolerances    Cipro (Stomach Upset; Nausea)  penicillin (Other)  	    REVIEW OF SYSTEMS:  CONSTITUTIONAL: No fever, weight loss, or fatigue  EYES: No eye pain, visual disturbances, or discharge  ENT:  No difficulty hearing, tinnitus, vertigo; No sinus or throat pain  NECK: No pain or stiffness  RESPIRATORY: No cough, wheezing, chills or hemoptysis; + Shortness of Breath  CARDIOVASCULAR: No chest pain, palpitations, passing out, dizziness, or leg swelling  GASTROINTESTINAL: + abdominal or epigastric pain. No nausea, vomiting, or hematemesis; No diarrhea or constipation. No melena or hematochezia.  GENITOURINARY: No dysuria, frequency, hematuria, or incontinence  NEUROLOGICAL: No headaches, memory loss, loss of strength, numbness, or tremors  SKIN: No itching, burning, rashes, or lesions   LYMPH Nodes: No enlarged glands  ENDOCRINE: No heat or cold intolerance; No hair loss  MUSCULOSKELETAL: No joint pain or swelling; No muscle, back, or extremity pain  PSYCHIATRIC: No depression, anxiety, mood swings, or difficulty sleeping  HEME/LYMPH: No easy bruising, or bleeding gums  ALLERGY AND IMMUNOLOGIC: No hives or eczema	    [ x] All others negative	  [ ] Unable to obtain    PHYSICAL EXAM:  T(C): 36.7 (04-28-25 @ 16:36), Max: 37.1 (04-28-25 @ 12:36)  HR: 77 (04-28-25 @ 16:36) (77 - 103)  BP: 141/78 (04-28-25 @ 16:36) (107/63 - 141/78)  RR: 18 (04-28-25 @ 16:36) (17 - 18)  SpO2: 98% (04-28-25 @ 16:36) (96% - 98%)  Wt(kg): --  I&O's Summary      Appearance: Normal	  HEENT:   Normal oral mucosa, PERRL, EOMI	  Lymphatic: No lymphadenopathy  Cardiovascular: Normal S1 S2, No JVD, + murmurs, No edema  Respiratory: rhgonchi  Psychiatry: A & O x 3, Mood & affect appropriate  Gastrointestinal:  Soft, Non-tender, + BS	  Skin: No rashes, No ecchymoses, No cyanosis	  Neurologic: Non-focal  Extremities: Normal range of motion, No clubbing, cyanosis or edema  Vascular: Peripheral pulses palpable 2+ bilaterally    TELEMETRY: 	    ECG:  	  RADIOLOGY:  OTHER: 	  	  LABS:	 	    CARDIAC MARKERS                        12.9   8.31  )-----------( 303      ( 28 Apr 2025 14:15 )             40.1     04-28    129[L]  |  97  |  19  ----------------------------<  117[H]  4.9   |  15[L]  |  0.92    Ca    9.1      28 Apr 2025 14:15    TPro  7.0  /  Alb  3.9  /  TBili  0.3  /  DBili  x   /  AST  35  /  ALT  14  /  AlkPhos  66  04-28    proBNP:   Lipid Profile:   HgA1c:   TSH:   PT/INR - ( 28 Apr 2025 14:15 )   PT: 11.8 sec;   INR: 1.04 ratio         PTT - ( 28 Apr 2025 14:15 )  PTT:26.4 sec    PREVIOUS DIAGNOSTIC TESTING:    < from: Xray Chest 1 View- PORTABLE-Urgent (Xray Chest 1 View- PORTABLE-Urgent .) (04.28.25 @ 15:25) >  COMPARISON: Radiograph 9/15/2024.    FINDINGS:  Status post TAVR and loop recorder.    The heart size is normal.  The lungs are clear.  There is no pneumothorax or pleural effusion.    IMPRESSION:  Clear lungs.    < from: TTE Echo Complete w/o Contrast w/ Doppler (02.18.23 @ 10:12) >  1. Technically limited study due to patient's physical condition.  2. Moderately severe aortic stenosis with associated mild aortic   insufficiency as described above.  3. Normal size and function of the left ventricle.  4. Correlate clinically.

## 2025-04-29 ENCOUNTER — APPOINTMENT (OUTPATIENT)
Dept: COLORECTAL SURGERY | Facility: CLINIC | Age: 85
End: 2025-04-29

## 2025-04-29 LAB
ANION GAP SERPL CALC-SCNC: 14 MMOL/L — SIGNIFICANT CHANGE UP (ref 5–17)
BUN SERPL-MCNC: 14 MG/DL — SIGNIFICANT CHANGE UP (ref 7–23)
CALCIUM SERPL-MCNC: 9 MG/DL — SIGNIFICANT CHANGE UP (ref 8.4–10.5)
CHLORIDE SERPL-SCNC: 102 MMOL/L — SIGNIFICANT CHANGE UP (ref 96–108)
CO2 SERPL-SCNC: 20 MMOL/L — LOW (ref 22–31)
CREAT SERPL-MCNC: 0.75 MG/DL — SIGNIFICANT CHANGE UP (ref 0.5–1.3)
CULTURE RESULTS: SIGNIFICANT CHANGE UP
EGFR: 77 ML/MIN/1.73M2 — SIGNIFICANT CHANGE UP
EGFR: 77 ML/MIN/1.73M2 — SIGNIFICANT CHANGE UP
GLUCOSE SERPL-MCNC: 85 MG/DL — SIGNIFICANT CHANGE UP (ref 70–99)
HCT VFR BLD CALC: 39 % — SIGNIFICANT CHANGE UP (ref 34.5–45)
HGB BLD-MCNC: 12 G/DL — SIGNIFICANT CHANGE UP (ref 11.5–15.5)
MCHC RBC-ENTMCNC: 27.4 PG — SIGNIFICANT CHANGE UP (ref 27–34)
MCHC RBC-ENTMCNC: 30.8 G/DL — LOW (ref 32–36)
MCV RBC AUTO: 89 FL — SIGNIFICANT CHANGE UP (ref 80–100)
NRBC BLD AUTO-RTO: 0 /100 WBCS — SIGNIFICANT CHANGE UP (ref 0–0)
PLATELET # BLD AUTO: 254 K/UL — SIGNIFICANT CHANGE UP (ref 150–400)
POTASSIUM SERPL-MCNC: 4 MMOL/L — SIGNIFICANT CHANGE UP (ref 3.5–5.3)
POTASSIUM SERPL-SCNC: 4 MMOL/L — SIGNIFICANT CHANGE UP (ref 3.5–5.3)
RBC # BLD: 4.38 M/UL — SIGNIFICANT CHANGE UP (ref 3.8–5.2)
RBC # FLD: 15.9 % — HIGH (ref 10.3–14.5)
SODIUM SERPL-SCNC: 136 MMOL/L — SIGNIFICANT CHANGE UP (ref 135–145)
SPECIMEN SOURCE: SIGNIFICANT CHANGE UP
WBC # BLD: 6.09 K/UL — SIGNIFICANT CHANGE UP (ref 3.8–10.5)
WBC # FLD AUTO: 6.09 K/UL — SIGNIFICANT CHANGE UP (ref 3.8–10.5)

## 2025-04-29 PROCEDURE — 51703 INSERT BLADDER CATH COMPLEX: CPT

## 2025-04-29 RX ORDER — ACETAMINOPHEN 500 MG/5ML
650 LIQUID (ML) ORAL EVERY 6 HOURS
Refills: 0 | Status: DISCONTINUED | OUTPATIENT
Start: 2025-04-29 | End: 2025-05-02

## 2025-04-29 RX ORDER — MELATONIN 5 MG
3 TABLET ORAL ONCE
Refills: 0 | Status: COMPLETED | OUTPATIENT
Start: 2025-04-29 | End: 2025-04-29

## 2025-04-29 RX ADMIN — Medication 75 MICROGRAM(S): at 05:00

## 2025-04-29 RX ADMIN — CEFTRIAXONE 100 MILLIGRAM(S): 500 INJECTION, POWDER, FOR SOLUTION INTRAMUSCULAR; INTRAVENOUS at 15:52

## 2025-04-29 RX ADMIN — HEPARIN SODIUM 5000 UNIT(S): 1000 INJECTION INTRAVENOUS; SUBCUTANEOUS at 21:09

## 2025-04-29 RX ADMIN — Medication 81 MILLIGRAM(S): at 11:27

## 2025-04-29 RX ADMIN — OXYBUTYNIN CHLORIDE 5 MILLIGRAM(S): 5 TABLET, FILM COATED, EXTENDED RELEASE ORAL at 11:26

## 2025-04-29 RX ADMIN — Medication 650 MILLIGRAM(S): at 08:04

## 2025-04-29 RX ADMIN — Medication 2 TABLET(S): at 21:08

## 2025-04-29 RX ADMIN — ROSUVASTATIN CALCIUM 5 MILLIGRAM(S): 20 TABLET, FILM COATED ORAL at 21:08

## 2025-04-29 RX ADMIN — Medication 650 MILLIGRAM(S): at 09:10

## 2025-04-29 RX ADMIN — ESCITALOPRAM OXALATE 20 MILLIGRAM(S): 20 TABLET ORAL at 11:27

## 2025-04-29 RX ADMIN — POLYETHYLENE GLYCOL 3350 17 GRAM(S): 17 POWDER, FOR SOLUTION ORAL at 11:26

## 2025-04-29 RX ADMIN — HEPARIN SODIUM 5000 UNIT(S): 1000 INJECTION INTRAVENOUS; SUBCUTANEOUS at 11:27

## 2025-04-29 RX ADMIN — Medication 3 MILLIGRAM(S): at 23:03

## 2025-04-29 RX ADMIN — DONEPEZIL HYDROCHLORIDE 5 MILLIGRAM(S): 5 TABLET ORAL at 21:08

## 2025-04-29 NOTE — PROCEDURE NOTE - ADDITIONAL PROCEDURE DETAILS
85yoF with urinary retention. Bladder scan showing ~500cc. Had downey placed yesterday for retention ~400cc, downey catheter removed today for attempted TOV, failed. Primary team attempted 3x, unsuccessful.   Patient was prepped and draped with sterile technique. 16F catheter inserted with immediate return of urine. Balloon inflated with 10cc sterile water and downey was secured to leg. Patient tolerated procedure well. Downey management per primary team.     [facility]  [address] [place] [address], [address] [address] [phone] 85yoF with urinary retention. Bladder scan showing ~500cc. Had downye placed yesterday for retention ~400cc, downey catheter removed today for attempted TOV, failed. Primary team attempted straight catheterization 3x, unsuccessful. Urology called for difficult downey placement.   Patient was prepped and draped with sterile technique. 16F catheter inserted with immediate return of urine. Balloon inflated with 10cc sterile water and downey was secured to leg. Patient tolerated procedure well. Downey management per primary team.     [facility]  [address] [place] [address], [place] [address]  [phone]

## 2025-04-30 LAB
ANION GAP SERPL CALC-SCNC: 15 MMOL/L — SIGNIFICANT CHANGE UP (ref 5–17)
BUN SERPL-MCNC: 14 MG/DL — SIGNIFICANT CHANGE UP (ref 7–23)
CALCIUM SERPL-MCNC: 9.1 MG/DL — SIGNIFICANT CHANGE UP (ref 8.4–10.5)
CHLORIDE SERPL-SCNC: 104 MMOL/L — SIGNIFICANT CHANGE UP (ref 96–108)
CO2 SERPL-SCNC: 19 MMOL/L — LOW (ref 22–31)
CREAT SERPL-MCNC: 0.67 MG/DL — SIGNIFICANT CHANGE UP (ref 0.5–1.3)
EGFR: 85 ML/MIN/1.73M2 — SIGNIFICANT CHANGE UP
EGFR: 85 ML/MIN/1.73M2 — SIGNIFICANT CHANGE UP
GLUCOSE SERPL-MCNC: 101 MG/DL — HIGH (ref 70–99)
POTASSIUM SERPL-MCNC: 3.8 MMOL/L — SIGNIFICANT CHANGE UP (ref 3.5–5.3)
POTASSIUM SERPL-SCNC: 3.8 MMOL/L — SIGNIFICANT CHANGE UP (ref 3.5–5.3)
SODIUM SERPL-SCNC: 138 MMOL/L — SIGNIFICANT CHANGE UP (ref 135–145)

## 2025-04-30 PROCEDURE — 99222 1ST HOSP IP/OBS MODERATE 55: CPT | Mod: GC

## 2025-04-30 PROCEDURE — G0545: CPT

## 2025-04-30 RX ORDER — METOPROLOL SUCCINATE 50 MG/1
25 TABLET, EXTENDED RELEASE ORAL DAILY
Refills: 0 | Status: DISCONTINUED | OUTPATIENT
Start: 2025-04-30 | End: 2025-05-02

## 2025-04-30 RX ADMIN — Medication 2 TABLET(S): at 21:43

## 2025-04-30 RX ADMIN — HEPARIN SODIUM 5000 UNIT(S): 1000 INJECTION INTRAVENOUS; SUBCUTANEOUS at 11:25

## 2025-04-30 RX ADMIN — ESCITALOPRAM OXALATE 20 MILLIGRAM(S): 20 TABLET ORAL at 11:24

## 2025-04-30 RX ADMIN — Medication 81 MILLIGRAM(S): at 11:24

## 2025-04-30 RX ADMIN — Medication 75 MICROGRAM(S): at 05:13

## 2025-04-30 RX ADMIN — HEPARIN SODIUM 5000 UNIT(S): 1000 INJECTION INTRAVENOUS; SUBCUTANEOUS at 21:41

## 2025-04-30 RX ADMIN — POLYETHYLENE GLYCOL 3350 17 GRAM(S): 17 POWDER, FOR SOLUTION ORAL at 11:25

## 2025-04-30 RX ADMIN — Medication 650 MILLIGRAM(S): at 16:55

## 2025-04-30 RX ADMIN — CEFTRIAXONE 100 MILLIGRAM(S): 500 INJECTION, POWDER, FOR SOLUTION INTRAMUSCULAR; INTRAVENOUS at 14:47

## 2025-04-30 RX ADMIN — OXYBUTYNIN CHLORIDE 5 MILLIGRAM(S): 5 TABLET, FILM COATED, EXTENDED RELEASE ORAL at 11:24

## 2025-04-30 RX ADMIN — DONEPEZIL HYDROCHLORIDE 5 MILLIGRAM(S): 5 TABLET ORAL at 21:42

## 2025-04-30 RX ADMIN — Medication 650 MILLIGRAM(S): at 17:35

## 2025-04-30 RX ADMIN — ROSUVASTATIN CALCIUM 5 MILLIGRAM(S): 20 TABLET, FILM COATED ORAL at 21:42

## 2025-04-30 NOTE — CONSULT NOTE ADULT - ATTENDING COMMENTS
85-yo F w/ PMH of CAD s/p PCI, ?TAVR, HTN, and hypothyroidism, recent dx of UTI s/p Bactrim. Presenting to [facility] w/ AMS.    From prior ED visit on 4/26, UA was w/ TNTC WBC, large LE, +nitrite, +bacteria (UCx NUF). S/p Bactrim and urinary offload, w/ improvement. Presented to [facility] w/ AMS on 4/28. UA this time w/ 25 WBC, 130 RBC, small LE, nitrite neg, and no bacteria. UCx NUF. nitiated on ceftriaxone, S/p Smith insertion on 4/29, w/ 850 cc output. Currently w/ improved mental status. Confusion likely a/w urinary retention than UTI.    #Bacteriuria  #AMS  #Urinary retention    Recommendations:  - Smith management per primary team and Uro f/u  - Complete ceftriaxone today (3-d)  - Monitor WBC, VS, and mental status.    Topics relating to disease transmission risk assessment and mitigation, complex antimicrobial therapy counseling and treatment, and/or public health investigation, analysis, and testing were addressed.  Plan discussed with primary team clinician.    Thank you for this consult. Inpatient ID consult team will discontinue active follow-up for this patient.  Further changes in lab values, imaging studies, or clinical status will not be known to ID inpatient consultants unless specifically communicated by primary team.        Chace Gregory MD, PhD  Attending Physician  [facility] Division of Infectious Diseases  Department of Medicine    Please contact through MS Teams message.  Office: [phone] (after 5 PM or weekend)

## 2025-04-30 NOTE — CONSULT NOTE ADULT - SUBJECTIVE AND OBJECTIVE BOX
INFECTIOUS DISEASE CONSULT NOTE    Twila Hester is an 85-year-old woman with a past medical history of CAD s/p PCI, HTN, HLD, ?TAVR, hypothyroidism presenting with a 6 day history of dysuria and chills.    Patient began having dysuria, increased urinary frequency, abdominal pain, nausea/vomiting, and chills on 04/25. Per her son, she received an antibiotic at the facility ([facility]) and then when her symptoms did not improve she was brought to [place] ED on 04/26. She was found to have urinary retention, CT A/P with bladder distention, and a UA concerning for UTI. She was discharged with a downey and with Bactrim 800-160 BID (planned for 04/26 - 05/02). After returning to her facility, she had improvement in her chills, N/V, and abdominal pain. She does note back pain, but states that she has chronic back pain after previous surgeries. She is unable to clarify if this back pain is different in nature than her chronic back pain. Morning of 04/28, patient was dizzy and per son was disoriented, prompting her to return to the ED. Presented afebrile and tachycardic to 103. No leukocytosis, UA still with signs of UTI, and downey in place. She was started on CTX 1 g q24h. ID consulted for antibiotic assistance.     Patient notes that she has been having frequent urinary tract infections. Per patient, around every other month. Her last urine cultures (09/2024 and 03/2024) in EMR with E. coli, R to ampicillin, I to ampicillin/sulbactam, and I to ciprofloxacin.    REVIEW OF SYSTEMS:  CONSTITUTIONAL: + chills, +fatigue  EYES: No eye pain, discharge  ENMT:   No sinus or throat pain  RESPIRATORY: No cough, No shortness of breath  CARDIOVASCULAR: No chest pain  GASTROINTESTINAL: +suprapubic abdominal pain, +constipation, +nausea/vomiting  GENITOURINARY: +incontinence, +frequency, +dysuria  NEUROLOGICAL: No headaches  SKIN: No itching, rashes  LYMPH NODES: No enlarged glands  ENDOCRINE: +polyuria  MUSCULOSKELETAL: +back pain  PSYCHIATRIC: +anxiety  HEME/LYMPH: +bruising  ALLERGY AND IMMUNOLOGIC: No hives or eczema      Prior hospital charts reviewed [Yes]  Primary team notes reviewed [Yes]  Other consultant notes reviewed [Yes]    PAST MEDICAL & SURGICAL HISTORY:  Degenerative disc disease, lumbar    Dyslipidemia    Constipation    Chronic pain    Dry eye of left side    Hypothyroidism    Insomnia    H/O corneal abrasion  left    CAD (coronary artery disease)    Chronic back pain    Anxiety    HTN (hypertension)    HLD (hyperlipidemia)    S/P sinus surgery  19 ya    S/P breast augmentation  29 ya, reconstruction 2015    S/P tendon repair  left hand, 2015    S/P hysterectomy  age 39    S/P eye surgery  49 ya, blepharoplasty    S/P hysterectomy    S/P appendectomy    History of cosmetic surgery    H/O sinus surgery    History of back surgery    SOCIAL HISTORY:  - Lives at [facility] assisted living. Denies history or current smoking, alcohol, or recreational drug use.      FAMILY HISTORY:  Family history of Alzheimer's disease (Father, Mother)    Family history of diabetes mellitus (Mother)    Family history of skin cancer (Father, Mother)    Family history of Alzheimer's disease (Father, Mother)    Family history of diabetes mellitus in mother (Mother)      Allergies:  latex (Urticaria; Rash; Hives)  Nuts (Hives)  aspirin (Unknown)  Cipro (Stomach Upset; Nausea)  nitrofurantoin (Swelling)  aspirin (Other)  chocolate (Other; Hives)  latex (Hives)  shellfish (Hives)  penicillin (Other)  shellfish (Anaphylaxis; Angioedema)      ANTIMICROBIALS:  cefTRIAXone   IVPB 1000 every 24 hours      ANTIMICROBIALS (past 90 days):  MEDICATIONS  (STANDING):  cefTRIAXone   IVPB   100 mL/Hr IV Intermittent (04-28-25 @ 14:49)    cefTRIAXone   IVPB   100 mL/Hr IV Intermittent (04-29-25 @ 15:52)    OTHER MEDS:   MEDICATIONS  (STANDING):  acetaminophen     Tablet .. 650 every 6 hours PRN  aspirin enteric coated 81 daily  donepezil 5 at bedtime  escitalopram 20 daily  heparin   Injectable 5000 every 12 hours  levothyroxine 75 daily  metoprolol succinate ER 25 daily  oxybutynin 5 daily  polyethylene glycol 3350 17 daily  rosuvastatin 5 at bedtime  senna 2 at bedtime      VITALS:  Vital Signs Last 24 Hrs  T(F): 98 (04-30-25 @ 05:05), Max: 98.7 (04-26-25 @ 09:29)    Vital Signs Last 24 Hrs  HR: 101 (04-30-25 @ 05:05) (69 - 101)  BP: 146/83 (04-30-25 @ 05:05) (124/73 - 146/83)  RR: 18 (04-30-25 @ 05:05)  SpO2: 95% (04-30-25 @ 05:05) (95% - 97%)  Wt(kg): --    EXAM:    Vital Signs Last 24 Hrs  T(C): 36.7 (30 Apr 2025 05:05), Max: 36.9 (29 Apr 2025 19:55)  T(F): 98 (30 Apr 2025 05:05), Max: 98.4 (29 Apr 2025 19:55)  HR: 101 (30 Apr 2025 05:05) (69 - 101)  BP: 146/83 (30 Apr 2025 05:05) (124/73 - 146/83)  BP(mean): 73 (29 Apr 2025 12:04) (73 - 73)  RR: 18 (30 Apr 2025 05:05) (18 - 18)  SpO2: 95% (30 Apr 2025 05:05) (95% - 97%)    Parameters below as of 30 Apr 2025 05:05  Patient On (Oxygen Delivery Method): room air    PHYSICAL EXAM:  GENERAL: NAD, well-groomed, well-developed  HEAD:  Atraumatic, Normocephalic  EYES:  conjunctiva and sclera clear  ENMT:  Moist mucous membranes  NECK: Supple  NERVOUS SYSTEM: inattentive, would require frequent prompting to answer questions, motor and sensation grossly intact in b/l UE and b/l LE  PSYCHIATRIC: Appropriate affect and mood  CHEST/LUNG: Clear to auscultation bilaterally; No rales, rhonchi, wheezing, or rubs  HEART: Regular rate and rhythm; No murmurs, rubs, or gallops. No LE edema  ABDOMEN: soft, non-distended, +suprapubic discomfort.  BACK: well-healed surgical scars along spine, no spinal tenderness, no tenderness to palpation, bilateral mild discomfort to firm tapping at CVA  EXTREMITIES:  No clubbing, cyanosis  SKIN: No rashes, bilateral bruising along lower extremities    Labs:                        12.0   6.09  )-----------( 254      ( 29 Apr 2025 07:03 )             39.0     04-30    138  |  104  |  14  ----------------------------<  101[H]  3.8   |  19[L]  |  0.67    Ca    9.1      30 Apr 2025 06:54    TPro  7.0  /  Alb  3.9  /  TBili  0.3  /  DBili  x   /  AST  35  /  ALT  14  /  AlkPhos  66  04-28      WBC Trend:  WBC Count: 6.09 (04-29-25 @ 07:03)  WBC Count: 8.31 (04-28-25 @ 14:15)  WBC Count: 7.41 (04-26-25 @ 11:09)      Auto Neutrophil #: 7.44 K/uL (09-15-24 @ 19:19)      Creatine Trend:  Creatinine: 0.67 (04-30)  Creatinine: 0.75 (04-29)  Creatinine: 0.92 (04-28)  Creatinine: 0.81 (04-26)      Liver Biochemical Testing Trend:  Alanine Aminotransferase (ALT/SGPT): 14 (04-28)  Alanine Aminotransferase (ALT/SGPT): 19 (04-26)  Alanine Aminotransferase (ALT/SGPT): 23 (09-15)  Aspartate Aminotransferase (AST/SGOT): 35 (04-28-25 @ 14:15)  Aspartate Aminotransferase (AST/SGOT): 20 (04-26-25 @ 11:09)  Aspartate Aminotransferase (AST/SGOT): 18 (09-15-24 @ 19:19)  Bilirubin Total: 0.3 (04-28)  Bilirubin Total: 0.3 (04-26)  Bilirubin Total: 0.4 (09-15)      Trend LDH          Urinalysis Basic - ( 30 Apr 2025 06:54 )    Color: x / Appearance: x / SG: x / pH: x  Gluc: 101 mg/dL / Ketone: x  / Bili: x / Urobili: x   Blood: x / Protein: x / Nitrite: x   Leuk Esterase: x / RBC: x / WBC x   Sq Epi: x / Non Sq Epi: x / Bacteria: x        MICROBIOLOGY:        Culture - Urine (collected 28 Apr 2025 15:32)  Source: Clean Catch Clean Catch (Midstream)  Final Report:    <10,000 CFU/mL Normal Urogenital Lalita    Culture - Blood (collected 28 Apr 2025 14:15)  Source: Blood Blood-Peripheral  Preliminary Report:    No growth at 24 hours    Culture - Blood (collected 28 Apr 2025 13:50)  Source: Blood Blood-Peripheral  Preliminary Report:    No growth at 24 hours    Urinalysis with Rflx Culture (collected 26 Apr 2025 12:45)    Culture - Urine (collected 26 Apr 2025 12:45)  Source: Clean Catch  Final Report:    <10,000 CFU/mL Normal Urogenital Lalita    Culture - Urine (collected 15 Sep 2024 20:40)  Source: Clean Catch Clean Catch (Midstream)  Final Report:    >100,000 CFU/ml Escherichia coli  Organism: Escherichia coli  Organism: Escherichia coli    Sensitivities:      -  Levofloxacin: S <=0.5      -  Tobramycin: S <=2      -  Nitrofurantoin: S <=32 Should not be used to treat pyelonephritis      -  Aztreonam: S <=4      -  Gentamicin: S <=2      -  Cefazolin: S <=2 For uncomplicated UTI with K. pneumoniae, E. coli, or P. mirablis: CHRISTA <=16 is sensitive and CHRISTA >=32 is resistant. This also predicts results for oral agents cefaclor, cefdinir, cefpodoxime, cefprozil, cefuroxime axetil, cephalexin and locarbef for uncomplicated UTI. Note that some isolates may be susceptible to these agents while testing resistant to cefazolin.      -  Cefepime: S <=2      -  Piperacillin/Tazobactam: S <=8      -  Ciprofloxacin: I 0.5      -  Imipenem: S <=1      -  Ceftriaxone: S <=1      -  Ampicillin: R >16 These ampicillin results predict results for amoxicillin      Method Type: CHRISTA      -  Meropenem: S <=1      -  Ampicillin/Sulbactam: I 16/8      -  Cefoxitin: S <=8      -  Cefuroxime: S <=4      -  Amoxicillin/Clavulanic Acid: S <=8/4      -  Trimethoprim/Sulfamethoxazole: S <=0.5/9.5      -  Ertapenem: S <=0.5    RADIOLOGY:  imaging below personally reviewed    < from: CT Abdomen and Pelvis w/ Oral Cont (04.26.25 @ 13:53) >  LOWER CHEST:  Partially imaged bilateral breast implants.  TAVR. Coronary artery calcification and/or stent.    Subpleural fibrosis.    Evaluation of the solid organ parenchyma is limited without intravenous   contrast.    LIVER: Within normal limits.  BILE DUCTS: Normal caliber.  GALLBLADDER: Within normal limits.  SPLEEN: Within normal limits.  PANCREAS: Within normal limits.  ADRENALS:  Stable 1.5 cm low-density left adrenal nodule.  KIDNEYS/URETERS:  Right renal cyst.  No hydronephrosis.    BLADDER: Moderately distended urinary bladder, correlate clinically with   urine output.  REPRODUCTIVE ORGANS: Hysterectomy.    BOWEL:  PERITONEUM/RETROPERITONEUM:  No bowel obstruction.  Appendectomy.  No extraluminal air or intra-abdominal fluid collection.    VESSELS: Atherosclerotic changes.  LYMPH NODES: No lymphadenopathy.    ABDOMINAL WALL:  Small fat-containing umbilical hernia.    BONES:  L5-S1 posterior fusion with grade 1 anterolisthesis L5 on S1.  Degenerative changes.    IMPRESSION:    No acute intra-abdominal pathology.    Other findings as discussed above.    < end of copied text >       INFECTIOUS DISEASE CONSULT NOTE    Twila Hester is an 85-year-old woman with a past medical history of CAD s/p PCI, HTN, HLD, ?TAVR, hypothyroidism presenting with a 6 day history of dysuria and chills and a 1 day history of confusion.    Patient began having dysuria, increased urinary frequency, abdominal pain, nausea/vomiting, and chills on 04/25. Per her son, she received an antibiotic at the facility ([facility]) and then when her symptoms did not improve she was brought to [place] [facility] ED on 04/26. She was found to have urinary retention, CT A/P with bladder distention, and a UA concerning for UTI. She was discharged with a downey and with Bactrim 800-160 BID (planned for 04/26 - 05/02). After returning to her facility, she had improvement in her chills, N/V, and abdominal pain. She does note back pain, but states that she has chronic back pain after previous surgeries. She is unable to clarify if this back pain is different in nature than her chronic back pain. Morning of 04/28, patient was dizzy and per son was disoriented, prompting her to return to the ED. Presented afebrile and tachycardic to 103. No leukocytosis, UA still with signs of UTI, and downey in place. She was started on CTX 1 g q24h. ID consulted for antibiotic assistance.     Patient notes that she has been having frequent urinary tract infections. Per patient, around every other month. Her last urine cultures (09/2024 and 03/2024) in EMR with E. coli, R to ampicillin, I to ampicillin/sulbactam, and I to ciprofloxacin.    REVIEW OF SYSTEMS:  CONSTITUTIONAL: + chills, +fatigue  EYES: No eye pain, discharge  ENMT:   No sinus or throat pain  RESPIRATORY: No cough, No shortness of breath  CARDIOVASCULAR: No chest pain  GASTROINTESTINAL: +suprapubic abdominal pain, +constipation, +nausea/vomiting  GENITOURINARY: +incontinence, +frequency, +dysuria  NEUROLOGICAL: No headaches  SKIN: No itching, rashes  LYMPH NODES: No enlarged glands  ENDOCRINE: +polyuria  MUSCULOSKELETAL: +back pain  PSYCHIATRIC: +anxiety  HEME/LYMPH: +bruising  ALLERGY AND IMMUNOLOGIC: No hives or eczema      Prior hospital charts reviewed [Yes]  Primary team notes reviewed [Yes]  Other consultant notes reviewed [Yes]    PAST MEDICAL & SURGICAL HISTORY:  Degenerative disc disease, lumbar    Dyslipidemia    Constipation    Chronic pain    Dry eye of left side    Hypothyroidism    Insomnia    H/O corneal abrasion  left    CAD (coronary artery disease)    Chronic back pain    Anxiety    HTN (hypertension)    HLD (hyperlipidemia)    S/P sinus surgery  19 ya    S/P breast augmentation  29 ya, reconstruction 2015    S/P tendon repair  left hand, 2015    S/P hysterectomy  age 39    S/P eye surgery  49 ya, blepharoplasty    S/P hysterectomy    S/P appendectomy    History of cosmetic surgery    H/O sinus surgery    History of back surgery    SOCIAL HISTORY:  - Lives at [facility] assisted living. Denies history or current smoking, alcohol, or recreational drug use.      FAMILY HISTORY:  Family history of Alzheimer's disease (Father, Mother)    Family history of diabetes mellitus (Mother)    Family history of skin cancer (Father, Mother)    Family history of Alzheimer's disease (Father, Mother)    Family history of diabetes mellitus in mother (Mother)      Allergies:  latex (Urticaria; Rash; Hives)  Nuts (Hives)  aspirin (Unknown)  Cipro (Stomach Upset; Nausea)  nitrofurantoin (Swelling)  aspirin (Other)  chocolate (Other; Hives)  latex (Hives)  shellfish (Hives)  penicillin (Other)  shellfish (Anaphylaxis; Angioedema)      ANTIMICROBIALS:  cefTRIAXone   IVPB 1000 every 24 hours      ANTIMICROBIALS (past 90 days):  MEDICATIONS  (STANDING):  cefTRIAXone   IVPB   100 mL/Hr IV Intermittent (04-28-25 @ 14:49)    cefTRIAXone   IVPB   100 mL/Hr IV Intermittent (04-29-25 @ 15:52)    OTHER MEDS:   MEDICATIONS  (STANDING):  acetaminophen     Tablet .. 650 every 6 hours PRN  aspirin enteric coated 81 daily  donepezil 5 at bedtime  escitalopram 20 daily  heparin   Injectable 5000 every 12 hours  levothyroxine 75 daily  metoprolol succinate ER 25 daily  oxybutynin 5 daily  polyethylene glycol 3350 17 daily  rosuvastatin 5 at bedtime  senna 2 at bedtime      VITALS:  Vital Signs Last 24 Hrs  T(F): 98 (04-30-25 @ 05:05), Max: 98.7 (04-26-25 @ 09:29)    Vital Signs Last 24 Hrs  HR: 101 (04-30-25 @ 05:05) (69 - 101)  BP: 146/83 (04-30-25 @ 05:05) (124/73 - 146/83)  RR: 18 (04-30-25 @ 05:05)  SpO2: 95% (04-30-25 @ 05:05) (95% - 97%)  Wt(kg): --    EXAM:    Vital Signs Last 24 Hrs  T(C): 36.7 (30 Apr 2025 05:05), Max: 36.9 (29 Apr 2025 19:55)  T(F): 98 (30 Apr 2025 05:05), Max: 98.4 (29 Apr 2025 19:55)  HR: 101 (30 Apr 2025 05:05) (69 - 101)  BP: 146/83 (30 Apr 2025 05:05) (124/73 - 146/83)  BP(mean): 73 (29 Apr 2025 12:04) (73 - 73)  RR: 18 (30 Apr 2025 05:05) (18 - 18)  SpO2: 95% (30 Apr 2025 05:05) (95% - 97%)    Parameters below as of 30 Apr 2025 05:05  Patient On (Oxygen Delivery Method): room air    PHYSICAL EXAM:  GENERAL: NAD, well-groomed, well-developed  HEAD:  Atraumatic, Normocephalic  EYES:  conjunctiva and sclera clear  ENMT:  Moist mucous membranes  NECK: Supple  NERVOUS SYSTEM: inattentive, would require frequent prompting to answer questions, motor and sensation grossly intact in b/l UE and b/l LE  PSYCHIATRIC: Appropriate affect and mood  CHEST/LUNG: Clear to auscultation bilaterally; No rales, rhonchi, wheezing, or rubs  HEART: Regular rate and rhythm; No murmurs, rubs, or gallops. No LE edema  ABDOMEN: soft, non-distended, +suprapubic discomfort.  BACK: well-healed surgical scars along spine, no spinal tenderness, no tenderness to palpation, bilateral mild discomfort to firm tapping at CVA  EXTREMITIES:  No clubbing, cyanosis  SKIN: No rashes, bilateral bruising along lower extremities    Labs:                        12.0   6.09  )-----------( 254      ( 29 Apr 2025 07:03 )             39.0     04-30    138  |  104  |  14  ----------------------------<  101[H]  3.8   |  19[L]  |  0.67    Ca    9.1      30 Apr 2025 06:54    TPro  7.0  /  Alb  3.9  /  TBili  0.3  /  DBili  x   /  AST  35  /  ALT  14  /  AlkPhos  66  04-28      WBC Trend:  WBC Count: 6.09 (04-29-25 @ 07:03)  WBC Count: 8.31 (04-28-25 @ 14:15)  WBC Count: 7.41 (04-26-25 @ 11:09)      Auto Neutrophil #: 7.44 K/uL (09-15-24 @ 19:19)      Creatine Trend:  Creatinine: 0.67 (04-30)  Creatinine: 0.75 (04-29)  Creatinine: 0.92 (04-28)  Creatinine: 0.81 (04-26)      Liver Biochemical Testing Trend:  Alanine Aminotransferase (ALT/SGPT): 14 (04-28)  Alanine Aminotransferase (ALT/SGPT): 19 (04-26)  Alanine Aminotransferase (ALT/SGPT): 23 (09-15)  Aspartate Aminotransferase (AST/SGOT): 35 (04-28-25 @ 14:15)  Aspartate Aminotransferase (AST/SGOT): 20 (04-26-25 @ 11:09)  Aspartate Aminotransferase (AST/SGOT): 18 (09-15-24 @ 19:19)  Bilirubin Total: 0.3 (04-28)  Bilirubin Total: 0.3 (04-26)  Bilirubin Total: 0.4 (09-15)      Trend LDH          Urinalysis Basic - ( 30 Apr 2025 06:54 )    Color: x / Appearance: x / SG: x / pH: x  Gluc: 101 mg/dL / Ketone: x  / Bili: x / Urobili: x   Blood: x / Protein: x / Nitrite: x   Leuk Esterase: x / RBC: x / WBC x   Sq Epi: x / Non Sq Epi: x / Bacteria: x        MICROBIOLOGY:        Culture - Urine (collected 28 Apr 2025 15:32)  Source: Clean Catch Clean Catch (Midstream)  Final Report:    <10,000 CFU/mL Normal Urogenital Lalita    Culture - Blood (collected 28 Apr 2025 14:15)  Source: Blood Blood-Peripheral  Preliminary Report:    No growth at 24 hours    Culture - Blood (collected 28 Apr 2025 13:50)  Source: Blood Blood-Peripheral  Preliminary Report:    No growth at 24 hours    Urinalysis with Rflx Culture (collected 26 Apr 2025 12:45)    Culture - Urine (collected 26 Apr 2025 12:45)  Source: Clean Catch  Final Report:    <10,000 CFU/mL Normal Urogenital Lalita    Culture - Urine (collected 15 Sep 2024 20:40)  Source: Clean Catch Clean Catch (Midstream)  Final Report:    >100,000 CFU/ml Escherichia coli  Organism: Escherichia coli  Organism: Escherichia coli    Sensitivities:      -  Levofloxacin: S <=0.5      -  Tobramycin: S <=2      -  Nitrofurantoin: S <=32 Should not be used to treat pyelonephritis      -  Aztreonam: S <=4      -  Gentamicin: S <=2      -  Cefazolin: S <=2 For uncomplicated UTI with K. pneumoniae, E. coli, or P. mirablis: CHRISTA <=16 is sensitive and CHRISTA >=32 is resistant. This also predicts results for oral agents cefaclor, cefdinir, cefpodoxime, cefprozil, cefuroxime axetil, cephalexin and locarbef for uncomplicated UTI. Note that some isolates may be susceptible to these agents while testing resistant to cefazolin.      -  Cefepime: S <=2      -  Piperacillin/Tazobactam: S <=8      -  Ciprofloxacin: I 0.5      -  Imipenem: S <=1      -  Ceftriaxone: S <=1      -  Ampicillin: R >16 These ampicillin results predict results for amoxicillin      Method Type: CHRISTA      -  Meropenem: S <=1      -  Ampicillin/Sulbactam: I 16/8      -  Cefoxitin: S <=8      -  Cefuroxime: S <=4      -  Amoxicillin/Clavulanic Acid: S <=8/4      -  Trimethoprim/Sulfamethoxazole: S <=0.5/9.5      -  Ertapenem: S <=0.5    RADIOLOGY:  imaging below personally reviewed    < from: CT Abdomen and Pelvis w/ Oral Cont (04.26.25 @ 13:53) >  LOWER CHEST:  Partially imaged bilateral breast implants.  TAVR. Coronary artery calcification and/or stent.    Subpleural fibrosis.    Evaluation of the solid organ parenchyma is limited without intravenous   contrast.    LIVER: Within normal limits.  BILE DUCTS: Normal caliber.  GALLBLADDER: Within normal limits.  SPLEEN: Within normal limits.  PANCREAS: Within normal limits.  ADRENALS:  Stable 1.5 cm low-density left adrenal nodule.  KIDNEYS/URETERS:  Right renal cyst.  No hydronephrosis.    BLADDER: Moderately distended urinary bladder, correlate clinically with   urine output.  REPRODUCTIVE ORGANS: Hysterectomy.    BOWEL:  PERITONEUM/RETROPERITONEUM:  No bowel obstruction.  Appendectomy.  No extraluminal air or intra-abdominal fluid collection.    VESSELS: Atherosclerotic changes.  LYMPH NODES: No lymphadenopathy.    ABDOMINAL WALL:  Small fat-containing umbilical hernia.    BONES:  L5-S1 posterior fusion with grade 1 anterolisthesis L5 on S1.  Degenerative changes.    IMPRESSION:    No acute intra-abdominal pathology.    Other findings as discussed above.    < end of copied text >

## 2025-04-30 NOTE — CONSULT NOTE ADULT - ASSESSMENT
Twila Hester is an 85-year-old woman with a past medical history of CAD s/p PCI, HTN, HLD, ?TAVR, hypothyroidism presenting with a 6 day history of dysuria and chills concerning for pyelonephritis and found to have urinary retention.  ID consulted for antibiotic assistance.     #Pyelonephritis  Presented afebrile and tachycardic to 103. No leukocytosis, UA with signs of UTI, and downey in place. Concern for pyelonephritis given chills and possible CVA tenderness with UTI. Patient with symptomatic improvement following bactrim initiation.    - s/p treatment initiation with Bactrim 800-160 BID (planned for 04/26 - 05/02).  - Urine cultures (09/2024 and 03/2024): E. coli, R to ampicillin, I to ampicillin/sulbactam, and I to ciprofloxacin.  - Urine culture from 04/26/25: <10K urogenital courtney.   - Antibiotics: CTX 1 g q24h (04/28 - )    Recommendations:  -     **incomplete until attending attestation   Twila Hester is an 85-year-old woman with a past medical history of CAD s/p PCI, HTN, HLD, ?TAVR, hypothyroidism presenting with a 6 day history of dysuria and chills concerning for pyelonephritis and found to have urinary retention.  ID consulted for antibiotic assistance.     #Acute cystitis  Presented afebrile and tachycardic to 103. No leukocytosis, UA with signs of UTI, and downey in place. Improvement of UA from previous presentation on 04/26. Patient with symptomatic improvement following bactrim initiation.    - s/p treatment initiation with Bactrim 800-160 BID (planned for 04/26 - 05/02).  - Urine cultures (09/2024 and 03/2024): E. coli, R to ampicillin, I to ampicillin/sulbactam, and I to ciprofloxacin.  - Urine culture from 04/26/25: <10K urogenital courtney.   - Antibiotics: CTX 1 g q24h (04/28 - )    Recommendations:  - Recommend 3 day course of CTX (04/28 - 04/30)    - case d/w Dr. Gregory

## 2025-05-01 RX ORDER — LIDOCAINE HYDROCHLORIDE 20 MG/ML
1 JELLY TOPICAL DAILY
Refills: 0 | Status: DISCONTINUED | OUTPATIENT
Start: 2025-05-01 | End: 2025-05-02

## 2025-05-01 RX ADMIN — HEPARIN SODIUM 5000 UNIT(S): 1000 INJECTION INTRAVENOUS; SUBCUTANEOUS at 22:56

## 2025-05-01 RX ADMIN — HEPARIN SODIUM 5000 UNIT(S): 1000 INJECTION INTRAVENOUS; SUBCUTANEOUS at 11:23

## 2025-05-01 RX ADMIN — LIDOCAINE HYDROCHLORIDE 1 PATCH: 20 JELLY TOPICAL at 13:20

## 2025-05-01 RX ADMIN — DONEPEZIL HYDROCHLORIDE 5 MILLIGRAM(S): 5 TABLET ORAL at 22:56

## 2025-05-01 RX ADMIN — Medication 650 MILLIGRAM(S): at 08:49

## 2025-05-01 RX ADMIN — Medication 650 MILLIGRAM(S): at 19:30

## 2025-05-01 RX ADMIN — METOPROLOL SUCCINATE 25 MILLIGRAM(S): 50 TABLET, EXTENDED RELEASE ORAL at 05:46

## 2025-05-01 RX ADMIN — Medication 2 TABLET(S): at 22:56

## 2025-05-01 RX ADMIN — Medication 650 MILLIGRAM(S): at 18:33

## 2025-05-01 RX ADMIN — Medication 81 MILLIGRAM(S): at 11:23

## 2025-05-01 RX ADMIN — Medication 650 MILLIGRAM(S): at 07:49

## 2025-05-01 RX ADMIN — ESCITALOPRAM OXALATE 20 MILLIGRAM(S): 20 TABLET ORAL at 11:23

## 2025-05-01 RX ADMIN — Medication 75 MICROGRAM(S): at 05:46

## 2025-05-01 RX ADMIN — OXYBUTYNIN CHLORIDE 5 MILLIGRAM(S): 5 TABLET, FILM COATED, EXTENDED RELEASE ORAL at 11:23

## 2025-05-01 RX ADMIN — ROSUVASTATIN CALCIUM 5 MILLIGRAM(S): 20 TABLET, FILM COATED ORAL at 22:56

## 2025-05-01 RX ADMIN — LIDOCAINE HYDROCHLORIDE 1 PATCH: 20 JELLY TOPICAL at 19:58

## 2025-05-02 ENCOUNTER — TRANSCRIPTION ENCOUNTER (OUTPATIENT)
Age: 85
End: 2025-05-02

## 2025-05-02 VITALS
RESPIRATION RATE: 18 BRPM | HEART RATE: 71 BPM | SYSTOLIC BLOOD PRESSURE: 136 MMHG | OXYGEN SATURATION: 98 % | DIASTOLIC BLOOD PRESSURE: 75 MMHG

## 2025-05-02 PROCEDURE — 96374 THER/PROPH/DIAG INJ IV PUSH: CPT

## 2025-05-02 PROCEDURE — 85027 COMPLETE CBC AUTOMATED: CPT

## 2025-05-02 PROCEDURE — 85025 COMPLETE CBC W/AUTO DIFF WBC: CPT

## 2025-05-02 PROCEDURE — 82803 BLOOD GASES ANY COMBINATION: CPT

## 2025-05-02 PROCEDURE — 87040 BLOOD CULTURE FOR BACTERIA: CPT

## 2025-05-02 PROCEDURE — 80053 COMPREHEN METABOLIC PANEL: CPT

## 2025-05-02 PROCEDURE — 83605 ASSAY OF LACTIC ACID: CPT

## 2025-05-02 PROCEDURE — 99285 EMERGENCY DEPT VISIT HI MDM: CPT | Mod: 25

## 2025-05-02 PROCEDURE — 81001 URINALYSIS AUTO W/SCOPE: CPT

## 2025-05-02 PROCEDURE — 84132 ASSAY OF SERUM POTASSIUM: CPT

## 2025-05-02 PROCEDURE — 71045 X-RAY EXAM CHEST 1 VIEW: CPT

## 2025-05-02 PROCEDURE — 84295 ASSAY OF SERUM SODIUM: CPT

## 2025-05-02 PROCEDURE — 82435 ASSAY OF BLOOD CHLORIDE: CPT

## 2025-05-02 PROCEDURE — 85730 THROMBOPLASTIN TIME PARTIAL: CPT

## 2025-05-02 PROCEDURE — 80048 BASIC METABOLIC PNL TOTAL CA: CPT

## 2025-05-02 PROCEDURE — 85018 HEMOGLOBIN: CPT

## 2025-05-02 PROCEDURE — 85610 PROTHROMBIN TIME: CPT

## 2025-05-02 PROCEDURE — 82330 ASSAY OF CALCIUM: CPT

## 2025-05-02 PROCEDURE — 85014 HEMATOCRIT: CPT

## 2025-05-02 PROCEDURE — 82947 ASSAY GLUCOSE BLOOD QUANT: CPT

## 2025-05-02 PROCEDURE — 87086 URINE CULTURE/COLONY COUNT: CPT

## 2025-05-02 RX ORDER — SULFAMETHOXAZOLE/TRIMETHOPRIM 800-160 MG
1 TABLET ORAL
Refills: 0 | DISCHARGE

## 2025-05-02 RX ORDER — METOPROLOL SUCCINATE 50 MG/1
1 TABLET, EXTENDED RELEASE ORAL
Qty: 30 | Refills: 0
Start: 2025-05-02

## 2025-05-02 RX ORDER — OXYBUTYNIN CHLORIDE 5 MG/1
1 TABLET, FILM COATED, EXTENDED RELEASE ORAL
Qty: 30 | Refills: 0
Start: 2025-05-02

## 2025-05-02 RX ORDER — METAXALONE 800 MG
1 TABLET ORAL
Refills: 0 | DISCHARGE

## 2025-05-02 RX ADMIN — ESCITALOPRAM OXALATE 20 MILLIGRAM(S): 20 TABLET ORAL at 12:36

## 2025-05-02 RX ADMIN — Medication 75 MICROGRAM(S): at 05:49

## 2025-05-02 RX ADMIN — Medication 650 MILLIGRAM(S): at 01:23

## 2025-05-02 RX ADMIN — LIDOCAINE HYDROCHLORIDE 1 PATCH: 20 JELLY TOPICAL at 01:14

## 2025-05-02 RX ADMIN — Medication 81 MILLIGRAM(S): at 12:36

## 2025-05-02 RX ADMIN — HEPARIN SODIUM 5000 UNIT(S): 1000 INJECTION INTRAVENOUS; SUBCUTANEOUS at 10:48

## 2025-05-02 RX ADMIN — METOPROLOL SUCCINATE 25 MILLIGRAM(S): 50 TABLET, EXTENDED RELEASE ORAL at 05:49

## 2025-05-02 RX ADMIN — LIDOCAINE HYDROCHLORIDE 1 PATCH: 20 JELLY TOPICAL at 12:35

## 2025-05-02 RX ADMIN — OXYBUTYNIN CHLORIDE 5 MILLIGRAM(S): 5 TABLET, FILM COATED, EXTENDED RELEASE ORAL at 12:36

## 2025-05-02 RX ADMIN — Medication 650 MILLIGRAM(S): at 02:18

## 2025-05-02 NOTE — DISCHARGE NOTE PROVIDER - HOSPITAL COURSE
HPI:  85-year-old woman       h/o   CAD,   HTN/  HLD,  Hypothyroidism       arrives   from [place], for urinary retention and abdominal pain. dizziness.  confusion .   seen at [place] 2 days ago,  had uti/ urinary retention.  Downey was placed and   d/c  on Bactrim.      History of tummy tuck, hysterectomy, appendectomy   CT scan   4/26 ,  distended bladder    referred to   er,   by Dr. Harrell her primary care.     /63, tachycardic to 103, afebrile. (28 Apr 2025 15:37)    Hospital Course:  Patient admitted for urinary tract infection. Infectious disease was consulted; patient completed a course of ceftriaxone during admission. Patient also noted to be hyponatremic and received IV fluids. Sodium is now within normal limits   Cardiology was consulted for hypertension; patient started on metoprolol and BP is now stable   Hospital course complicated by urinary retention. Patient failed trial of void and is being discharged with downey and started Ditropan     Important Medication Changes and Reason:  >Continue metoprolol for HTN   >Continue ditropan for urinary retention     Active or Pending Issues Requiring Follow-up:  >PCP follow up within one week for further outpatient management  >Cardiology follow up for further outpatient management of HTN   >Urology follow up for further outpatient management of downey     Advanced Directives:   [X] Full code  [ ] DNR  [ ] Hospice    Discharge Diagnoses:  >UTI   >HTN   >Urinary retention     Discharge/dispo/med rec discussed with medical attending Dr. Pyle. Patient is medically cleared for discharge with outpatient follow up

## 2025-05-02 NOTE — PROGRESS NOTE ADULT - PROVIDER SPECIALTY LIST ADULT
Cardiology
Family Medicine
Family Medicine
Cardiology
Family Medicine
Cardiology
Cardiology
Family Medicine

## 2025-05-02 NOTE — DISCHARGE NOTE PROVIDER - CARE PROVIDER_API CALL
Leroy Harrell  Internal Medicine  [address], [address]  [place] [address]  Phone: [phone]  Fax: [phone]  Follow Up Time: 1 week    Jada Mccray  Cardiovascular Disease  [address]  [place] [address]  Phone: [phone]  Fax: [phone]  Follow Up Time:    Leroy Harrell  Internal Medicine  [address], [address]  [place] [address]  Phone: [phone]  Fax: [phone]  Follow Up Time: 1 week    Jada Mccray  Cardiovascular Disease  [address], [address]  [place] [address]  Phone: [phone]  Fax: [phone]  Follow Up Time:     Omkar Velazco  Urology  [address]  [address]  [place] [address]  Phone: ([phone]  Fax: ([phone]  Follow Up Time:

## 2025-05-02 NOTE — PROGRESS NOTE ADULT - REASON FOR ADMISSION
abd  pain/  uti

## 2025-05-02 NOTE — DISCHARGE NOTE PROVIDER - NSDCFUSCHEDAPPT_GEN_ALL_CORE_FT
Nataly De La Rosa  [facility]  [facility] [address]  Scheduled Appointment: 06/18/2025

## 2025-05-02 NOTE — DISCHARGE NOTE PROVIDER - NSDCCPCAREPLAN_GEN_ALL_CORE_FT
PRINCIPAL DISCHARGE DIAGNOSIS  Diagnosis: UTI (urinary tract infection)  Assessment and Plan of Treatment: You had a bladder and/or kidney infection. You completed a course of IV antibiotics during admission.  Avoid medications that will cause urinary retention such as benadryl whenever possible.  Drink adequate fluids - there is no harm in drinking cranberry juice.  Contact your doctor if you experience new symptoms or continued symptoms after treatment, such as pain or burning with urination, frequent urination, urinary urgency, blood in the urine, fever, back pain, and/or nausea vomiting.   Follow up with your PCP within one week for further outpatient management      SECONDARY DISCHARGE DIAGNOSES  Diagnosis: Hyponatremia  Assessment and Plan of Treatment: Resolved    Diagnosis: Urinary retention  Assessment and Plan of Treatment: You are being discharged with a downey   Follow up with urology for further outpatient management  Continue ditropan as prescribed for urinary retention    Diagnosis: HTN (hypertension)  Assessment and Plan of Treatment: Continue to follow a low salt/sodium diet.  Perform physical activities as tolerated in consultation with your Primary Care Provider and physical therapist.  Take all medications as prescribed.  Follow up with your medical doctor for routine blood pressure monitoring at your next visit.  Notify your doctor if you have any of the following symptoms:  Dizziness, lightheadedness, blurry vision, headache, chest pain, or shortness of breath.

## 2025-05-02 NOTE — DISCHARGE NOTE PROVIDER - PROVIDER TOKENS
PROVIDER:[TOKEN:[[id number]],FOLLOWUP:[1 week]],PROVIDER:[TOKEN:[[id number]]] PROVIDER:[TOKEN:[[id number]],FOLLOWUP:[1 week]],PROVIDER:[TOKEN:[[id number]]],PROVIDER:[TOKEN:[[id number]]]

## 2025-05-02 NOTE — DISCHARGE NOTE NURSING/CASE MANAGEMENT/SOCIAL WORK - NSDCPEFALRISK_GEN_ALL_CORE
For information on Fall & Injury Prevention, visit: [email or web address] OR  [email or web address] OR  https://www.cdc.gov/steadi/patient.html

## 2025-05-02 NOTE — DISCHARGE NOTE PROVIDER - CARE PROVIDERS DIRECT ADDRESSES
,DirectAddress_Unknown,DirectAddress_Unknown ,DirectAddress_Unknown,DirectAddress_Unknown,[email or web address]

## 2025-05-02 NOTE — DISCHARGE NOTE NURSING/CASE MANAGEMENT/SOCIAL WORK - PATIENT PORTAL LINK FT
You can access the FollowMyHealth Patient Portal offered by [facility] by registering at the following website: [email or web address]. By joining [facility]’s FollowMyHealth portal, you will also be able to view your health information using other applications (apps) compatible with our system.

## 2025-05-02 NOTE — PROGRESS NOTE ADULT - SUBJECTIVE AND OBJECTIVE BOX
---___---___---___---___---___---___ ---___---___---___---___---___---___---___---___---                  M E D I C A L   A T T E N D I N G   P R O G R E S S   N O T E  ---___---___---___---___---___---___ ---___---___---___---___---___---___---___---___---        ================================================    ++CHIEF COMPLAINT:   Patient is a 85y old  Female who presents with a chief complaint of abd  pain/  uti (01 May 2025 07:55)      Tubulointerstitial nephritis      ---___---___---___---___---___---  PAST MEDICAL / Surgical  HISTORY:  PAST MEDICAL & SURGICAL HISTORY:  Degenerative disc disease, lumbar      Dyslipidemia      Constipation      Chronic pain      Dry eye of left side      Hypothyroidism      Insomnia      H/O corneal abrasion  left      CAD (coronary artery disease)      Chronic back pain      Anxiety      HTN (hypertension)      HLD (hyperlipidemia)      S/P sinus surgery  19 ya      S/P breast augmentation  29 ya, reconstruction       S/P tendon repair  left hand,       S/P hysterectomy  age 39      S/P eye surgery  49 ya, blepharoplasty      S/P hysterectomy      S/P appendectomy      History of cosmetic surgery      H/O sinus surgery      History of back surgery          ---___---___---___---___---___---  FAMILY HISTORY:   FAMILY HISTORY:  Family history of Alzheimer's disease (Father, Mother)    Family history of diabetes mellitus (Mother)    Family history of skin cancer (Father, Mother)    Family history of Alzheimer's disease (Father, Mother)    Family history of diabetes mellitus in mother (Mother)          ---___---___---___---___---___---  ALLERGIES:   Allergies    latex (Urticaria; Rash; Hives)  Nuts (Hives)  aspirin (Unknown)  nitrofurantoin (Swelling)  aspirin (Other)  chocolate (Other; Hives)  latex (Hives)  shellfish (Hives)  shellfish (Anaphylaxis; Angioedema)    Intolerances    Cipro (Stomach Upset; Nausea)  penicillin (Other)      ---___---___---___---___---___---  MEDICATIONS:  MEDICATIONS  (STANDING):  aspirin enteric coated 81 milliGRAM(s) Oral daily  donepezil 5 milliGRAM(s) Oral at bedtime  escitalopram 20 milliGRAM(s) Oral daily  heparin   Injectable 5000 Unit(s) SubCutaneous every 12 hours  levothyroxine 75 MICROGram(s) Oral daily  lidocaine   4% Patch 1 Patch Transdermal daily  metoprolol succinate ER 25 milliGRAM(s) Oral daily  oxybutynin 5 milliGRAM(s) Oral daily  polyethylene glycol 3350 17 Gram(s) Oral daily  rosuvastatin 5 milliGRAM(s) Oral at bedtime  senna 2 Tablet(s) Oral at bedtime    MEDICATIONS  (PRN):  acetaminophen     Tablet .. 650 milliGRAM(s) Oral every 6 hours PRN Mild Pain (1 - 3), Moderate Pain (4 - 6)      ---___---___---___---___---___---  REVIEW OF SYSTEM:    GEN: no fever, no chills, no pain  RESP: no SOB, no cough, no sputum  CVS: no chest pain, no palpitations, no edema  GI: no abdominal pain, no nausea, no vomiting, no constipation, no diarrhea  : no dysurea, no frequency, no hematurea  Neuro: no headache, no dizziness  PSYCH: no anxiety, no depression  Derm : no itching, no rash    ---___---___---___---___---___---  VITAL SIGNS:  85y , CAPILLARY BLOOD GLUCOSE        T(C): 36.9 (25 @ 12:06), Max: 37 (25 @ 19:52)  HR: 73 (25 @ 12:06) (71 - 79)  BP: 120/76 (25 @ 12:06) (120/76 - 157/79)  RR: 18 (25 @ 12:06) (18 - 18)  SpO2: 96% (25 @ 12:06) (96% - 98%)  ---___---___---___---___---___---  PHYSICAL EXAM:    GEN: A&O X 3 , NAD , comfortable  HEENT: NCAT, PERRL, MMM, hearing intact  Neck: supple , no JVD  CVS: S1S2 , regular , No M/R/G appreciated  PULM: CTA B/L,  no W/R/R appreciated  ABD.: soft. non tender, non distended,  bowel sounds present  Extrem: intact pulses , no edema   Derm: No rash , no ecchymoses  PSYCH : normal mood,  no delusion not anxious     ---___---___---___---___---___---            LAB AND IMAGIN-30    138  |  104  |  14  ----------------------------<  101[H]  3.8   |  19[L]  |  0.67    Ca    9.1      2025 06:54                              Urinalysis Basic - ( 2025 06:54 )    Color: x / Appearance: x / SG: x / pH: x  Gluc: 101 mg/dL / Ketone: x  / Bili: x / Urobili: x   Blood: x / Protein: x / Nitrite: x   Leuk Esterase: x / RBC: x / WBC x   Sq Epi: x / Non Sq Epi: x / Bacteria: x        [All pertinent / recent Imaging reviewed]         ---___---___---___---___---___---___ ---___---___---___---___---                         A S S E S S M E N T   A N D   P L A N :      HEALTH ISSUES - PROBLEM Dx:    uti finishing course of abx   dehydration continue iv fluids   trial of void   hypothyroid on synthroid   depression continue meds     id consult appreciated   consider dc planning     patient wanted to do another  trial of void    if unable to void  will need to leave downey  back in place               -GI/DVT Prophylaxis.    --------------------------------------------  Case discussed with   Education given on   ___________________________  Thank you,  Michael Pyle  [phone]
      ---___---___---___---___---___---___ ---___---___---___---___---___---___---___---___---                  M E D I C A L   A T T E N D I N G   P R O G R E S S   N O T E  ---___---___---___---___---___---___ ---___---___---___---___---___---___---___---___---        ================================================    ++CHIEF COMPLAINT:   Patient is a 85y old  Female who presents with a chief complaint of abd  pain/  uti (02 May 2025 09:48)      Tubulointerstitial nephritis      ---___---___---___---___---___---  PAST MEDICAL / Surgical  HISTORY:  PAST MEDICAL & SURGICAL HISTORY:  Degenerative disc disease, lumbar      Dyslipidemia      Constipation      Chronic pain      Dry eye of left side      Hypothyroidism      Insomnia      H/O corneal abrasion  left      CAD (coronary artery disease)      Chronic back pain      Anxiety      HTN (hypertension)      HLD (hyperlipidemia)      S/P sinus surgery  19 ya      S/P breast augmentation  29 ya, reconstruction 2015      S/P tendon repair  left hand, 2015      S/P hysterectomy  age 39      S/P eye surgery  49 ya, blepharoplasty      S/P hysterectomy      S/P appendectomy      History of cosmetic surgery      H/O sinus surgery      History of back surgery          ---___---___---___---___---___---  FAMILY HISTORY:   FAMILY HISTORY:  Family history of Alzheimer's disease (Father, Mother)    Family history of diabetes mellitus (Mother)    Family history of skin cancer (Father, Mother)    Family history of Alzheimer's disease (Father, Mother)    Family history of diabetes mellitus in mother (Mother)          ---___---___---___---___---___---  ALLERGIES:   Allergies    latex (Urticaria; Rash; Hives)  Nuts (Hives)  aspirin (Unknown)  nitrofurantoin (Swelling)  aspirin (Other)  chocolate (Other; Hives)  latex (Hives)  shellfish (Hives)  shellfish (Anaphylaxis; Angioedema)    Intolerances    Cipro (Stomach Upset; Nausea)  penicillin (Other)      ---___---___---___---___---___---  MEDICATIONS:  MEDICATIONS  (STANDING):  aspirin enteric coated 81 milliGRAM(s) Oral daily  donepezil 5 milliGRAM(s) Oral at bedtime  escitalopram 20 milliGRAM(s) Oral daily  heparin   Injectable 5000 Unit(s) SubCutaneous every 12 hours  levothyroxine 75 MICROGram(s) Oral daily  lidocaine   4% Patch 1 Patch Transdermal daily  metoprolol succinate ER 25 milliGRAM(s) Oral daily  oxybutynin 5 milliGRAM(s) Oral daily  polyethylene glycol 3350 17 Gram(s) Oral daily  rosuvastatin 5 milliGRAM(s) Oral at bedtime  senna 2 Tablet(s) Oral at bedtime    MEDICATIONS  (PRN):  acetaminophen     Tablet .. 650 milliGRAM(s) Oral every 6 hours PRN Mild Pain (1 - 3), Moderate Pain (4 - 6)      ---___---___---___---___---___---  REVIEW OF SYSTEM:    GEN: no fever, no chills, no pain  RESP: no SOB, no cough, no sputum  CVS: no chest pain, no palpitations, no edema  GI: no abdominal pain, no nausea, no vomiting, no constipation, no diarrhea  : no dysurea, no frequency, no hematurea  Neuro: no headache, no dizziness  PSYCH: no anxiety, no depression  Derm : no itching, no rash    ---___---___---___---___---___---  VITAL SIGNS:  85y , CAPILLARY BLOOD GLUCOSE        T(C): 36.7 (05-02-25 @ 13:01), Max: 36.7 (05-01-25 @ 19:41)  HR: 68 (05-02-25 @ 13:01) (68 - 75)  BP: 116/70 (05-02-25 @ 13:01) (111/71 - 159/80)  RR: 18 (05-02-25 @ 13:01) (18 - 18)  SpO2: 100% (05-02-25 @ 13:01) (95% - 100%)  ---___---___---___---___---___---  PHYSICAL EXAM:    GEN: A&O X 3 , NAD , comfortable  HEENT: NCAT, PERRL, MMM, hearing intact  Neck: supple , no JVD  CVS: S1S2 , regular , No M/R/G appreciated  PULM: CTA B/L,  no W/R/R appreciated  ABD.: soft. non tender, non distended,  bowel sounds present  Extrem: intact pulses , no edema   Derm: No rash , no ecchymoses  PSYCH : normal mood,  no delusion not anxious     ---___---___---___---___---___---            LAB AND IMAGING:                                                  [All pertinent / recent Imaging reviewed]         ---___---___---___---___---___---___ ---___---___---___---___---                         A S S E S S M E N T   A N D   P L A N :      HEALTH ISSUES - PROBLEM Dx:  finished  course of abx   dehydration continue iv fluids   hypothyroid on synthroid   depression continue meds     id consult appreciated   consider dc planning     tried another trial of void held 700 cc of urine   will continue downey and discussed  downey should stay and  she needs to follow up with urologhy outpt .              ___________________________  Thank you,  Michael Pyle  [phone]
      ---___---___---___---___---___---___ ---___---___---___---___---___---___---___---___---                  M E D I C A L   A T T E N D I N G   P R O G R E S S   N O T E  ---___---___---___---___---___---___ ---___---___---___---___---___---___---___---___---        ================================================    ++CHIEF COMPLAINT:   Patient is a 85y old  Female who presents with a chief complaint of abd  pain/  uti (2025 07:22)      Tubulointerstitial nephritis      ---___---___---___---___---___---  PAST MEDICAL / Surgical  HISTORY:  PAST MEDICAL & SURGICAL HISTORY:  Degenerative disc disease, lumbar      Dyslipidemia      Constipation      Chronic pain      Dry eye of left side      Hypothyroidism      Insomnia      H/O corneal abrasion  left      CAD (coronary artery disease)      Chronic back pain      Anxiety      HTN (hypertension)      HLD (hyperlipidemia)      S/P sinus surgery  19 ya      S/P breast augmentation  29 ya, reconstruction       S/P tendon repair  left hand,       S/P hysterectomy  age 39      S/P eye surgery  49 ya, blepharoplasty      S/P hysterectomy      S/P appendectomy      History of cosmetic surgery      H/O sinus surgery      History of back surgery          ---___---___---___---___---___---  FAMILY HISTORY:   FAMILY HISTORY:  Family history of Alzheimer's disease (Father, Mother)    Family history of diabetes mellitus (Mother)    Family history of skin cancer (Father, Mother)    Family history of Alzheimer's disease (Father, Mother)    Family history of diabetes mellitus in mother (Mother)          ---___---___---___---___---___---  ALLERGIES:   Allergies    latex (Urticaria; Rash; Hives)  Nuts (Hives)  aspirin (Unknown)  nitrofurantoin (Swelling)  aspirin (Other)  chocolate (Other; Hives)  latex (Hives)  shellfish (Hives)  shellfish (Anaphylaxis; Angioedema)    Intolerances    Cipro (Stomach Upset; Nausea)  penicillin (Other)      ---___---___---___---___---___---  MEDICATIONS:  MEDICATIONS  (STANDING):  aspirin enteric coated 81 milliGRAM(s) Oral daily  cefTRIAXone   IVPB 1000 milliGRAM(s) IV Intermittent every 24 hours  donepezil 5 milliGRAM(s) Oral at bedtime  escitalopram 20 milliGRAM(s) Oral daily  heparin   Injectable 5000 Unit(s) SubCutaneous every 12 hours  levothyroxine 75 MICROGram(s) Oral daily  oxybutynin 5 milliGRAM(s) Oral daily  polyethylene glycol 3350 17 Gram(s) Oral daily  rosuvastatin 5 milliGRAM(s) Oral at bedtime  senna 2 Tablet(s) Oral at bedtime  sodium chloride 0.9%. 1000 milliLiter(s) (60 mL/Hr) IV Continuous <Continuous>    MEDICATIONS  (PRN):  acetaminophen     Tablet .. 650 milliGRAM(s) Oral every 6 hours PRN Mild Pain (1 - 3), Moderate Pain (4 - 6)      ---___---___---___---___---___---  REVIEW OF SYSTEM:    GEN: no fever, no chills, no pain  RESP: no SOB, no cough, no sputum  CVS: no chest pain, no palpitations, no edema  GI: no abdominal pain, no nausea, no vomiting, no constipation, no diarrhea  : no dysurea, no frequency, no hematurea  Neuro: no headache, no dizziness  PSYCH: no anxiety, no depression  Derm : no itching, no rash    ---___---___---___---___---___---  VITAL SIGNS:  85y , CAPILLARY BLOOD GLUCOSE        T(C): 36.3 (25 @ 12:04), Max: 36.9 (25 @ 20:29)  HR: 69 (25 @ 12:04) (69 - 75)  BP: 124/73 (25 @ 12:04) (118/70 - 125/72)  RR: 18 (25 @ 12:04) (18 - 18)  SpO2: 97% (25 @ 12:04) (97% - 97%)  ---___---___---___---___---___---  PHYSICAL EXAM:    GEN: A&O X 3 , NAD , comfortable  HEENT: NCAT, PERRL, MMM, hearing intact  Neck: supple , no JVD  CVS: S1S2 , regular , No M/R/G appreciated  PULM: CTA B/L,  no W/R/R appreciated  ABD.: soft. non tender, non distended,  bowel sounds present  Extrem: intact pulses , no edema   Derm: No rash , no ecchymoses  PSYCH : normal mood,  no delusion not anxious     ---___---___---___---___---___---            LAB AND IMAGIN.0   6.09  )-----------( 254      ( 2025 07:03 )             39.0                   136  |  102  |  14  ----------------------------<  85  4.0   |  20[L]  |  0.75    Ca    9.0      2025 07:02    TPro  7.0  /  Alb  3.9  /  TBili  0.3  /  DBili  x   /  AST  35  /  ALT  14  /  AlkPhos  66  04-28    PT/INR - ( 2025 14:15 )   PT: 11.8 sec;   INR: 1.04 ratio         PTT - ( 2025 14:15 )  PTT:26.4 sec                        Urinalysis Basic - ( 2025 07:02 )    Color: x / Appearance: x / SG: x / pH: x  Gluc: 85 mg/dL / Ketone: x  / Bili: x / Urobili: x   Blood: x / Protein: x / Nitrite: x   Leuk Esterase: x / RBC: x / WBC x   Sq Epi: x / Non Sq Epi: x / Bacteria: x        [All pertinent / recent Imaging reviewed]         ---___---___---___---___---___---___ ---___---___---___---___---                         A S S E S S M E N T   A N D   P L A N :      HEALTH ISSUES - PROBLEM Dx:  uti on iv abx   dehydration continue iv fluids   trial of void   hypothyroid on synthroid   depression continue meds         ___________________________  Thank you,  Michael Pyle  [phone]
      ---___---___---___---___---___---___ ---___---___---___---___---___---___---___---___---                  M E D I C A L   A T T E N D I N G   P R O G R E S S   N O T E  ---___---___---___---___---___---___ ---___---___---___---___---___---___---___---___---        ================================================    ++CHIEF COMPLAINT:   Patient is a 85y old  Female who presents with a chief complaint of abd  pain/  uti (2025 10:55)      Tubulointerstitial nephritis      ---___---___---___---___---___---  PAST MEDICAL / Surgical  HISTORY:  PAST MEDICAL & SURGICAL HISTORY:  Degenerative disc disease, lumbar      Dyslipidemia      Constipation      Chronic pain      Dry eye of left side      Hypothyroidism      Insomnia      H/O corneal abrasion  left      CAD (coronary artery disease)      Chronic back pain      Anxiety      HTN (hypertension)      HLD (hyperlipidemia)      S/P sinus surgery  19 ya      S/P breast augmentation  29 ya, reconstruction       S/P tendon repair  left hand,       S/P hysterectomy  age 39      S/P eye surgery  49 ya, blepharoplasty      S/P hysterectomy      S/P appendectomy      History of cosmetic surgery      H/O sinus surgery      History of back surgery          ---___---___---___---___---___---  FAMILY HISTORY:   FAMILY HISTORY:  Family history of Alzheimer's disease (Father, Mother)    Family history of diabetes mellitus (Mother)    Family history of skin cancer (Father, Mother)    Family history of Alzheimer's disease (Father, Mother)    Family history of diabetes mellitus in mother (Mother)          ---___---___---___---___---___---  ALLERGIES:   Allergies    latex (Urticaria; Rash; Hives)  Nuts (Hives)  aspirin (Unknown)  nitrofurantoin (Swelling)  aspirin (Other)  chocolate (Other; Hives)  latex (Hives)  shellfish (Hives)  shellfish (Anaphylaxis; Angioedema)    Intolerances    Cipro (Stomach Upset; Nausea)  penicillin (Other)      ---___---___---___---___---___---  MEDICATIONS:  MEDICATIONS  (STANDING):  aspirin enteric coated 81 milliGRAM(s) Oral daily  donepezil 5 milliGRAM(s) Oral at bedtime  escitalopram 20 milliGRAM(s) Oral daily  heparin   Injectable 5000 Unit(s) SubCutaneous every 12 hours  levothyroxine 75 MICROGram(s) Oral daily  metoprolol succinate ER 25 milliGRAM(s) Oral daily  oxybutynin 5 milliGRAM(s) Oral daily  polyethylene glycol 3350 17 Gram(s) Oral daily  rosuvastatin 5 milliGRAM(s) Oral at bedtime  senna 2 Tablet(s) Oral at bedtime    MEDICATIONS  (PRN):  acetaminophen     Tablet .. 650 milliGRAM(s) Oral every 6 hours PRN Mild Pain (1 - 3), Moderate Pain (4 - 6)      ---___---___---___---___---___---  REVIEW OF SYSTEM:    GEN: no fever, no chills, no pain  RESP: no SOB, no cough, no sputum  CVS: no chest pain, no palpitations, no edema  GI: no abdominal pain, no nausea, no vomiting, no constipation, no diarrhea  : no dysurea, no frequency, no hematurea  Neuro: no headache, no dizziness  PSYCH: no anxiety, no depression  Derm : no itching, no rash    ---___---___---___---___---___---  VITAL SIGNS:  85y , CAPILLARY BLOOD GLUCOSE        T(C): 37 (25 @ 19:52), Max: 37 (25 @ 19:52)  HR: 71 (25 @ 19:52) (71 - 101)  BP: 157/79 (25 @ 19:52) (128/72 - 157/79)  RR: 18 (25 @ 19:52) (18 - 18)  SpO2: 98% (25 @ 19:52) (95% - 98%)  ---___---___---___---___---___---  PHYSICAL EXAM:    GEN: A&O X 3 , NAD , comfortable  HEENT: NCAT, PERRL, MMM, hearing intact  Neck: supple , no JVD  CVS: S1S2 , regular , No M/R/G appreciated  PULM: CTA B/L,  no W/R/R appreciated  ABD.: soft. non tender, non distended,  bowel sounds present  Extrem: intact pulses , no edema   Derm: No rash , no ecchymoses  PSYCH : normal mood,  no delusion not anxious     ---___---___---___---___---___---            LAB AND IMAGIN.0   6.09  )-----------( 254      ( 2025 07:03 )             39.0               04-30    138  |  104  |  14  ----------------------------<  101[H]  3.8   |  19[L]  |  0.67    Ca    9.1      2025 06:54                              Urinalysis Basic - ( 2025 06:54 )    Color: x / Appearance: x / SG: x / pH: x  Gluc: 101 mg/dL / Ketone: x  / Bili: x / Urobili: x   Blood: x / Protein: x / Nitrite: x   Leuk Esterase: x / RBC: x / WBC x   Sq Epi: x / Non Sq Epi: x / Bacteria: x        [All pertinent / recent Imaging reviewed]         ---___---___---___---___---___---___ ---___---___---___---___---                         A S S E S S M E N T   A N D   P L A N :      HEALTH ISSUES - PROBLEM Dx:  uti finishing course of abx   dehydration continue iv fluids   trial of void   hypothyroid on synthroid   depression continue meds     id consult appreciated   consider dc planning               -GI/DVT Prophylaxis.    --------------------------------------------  Case discussed with   Education given on   ___________________________  Thank you,  Michael Pyle  [phone]
Date of Service: 04-29-25 @ 07:22           CARDIOLOGY     PROGRESS  NOTE   ________________________________________________  CHIEF COMPLAINT:Patient is a 85y old  Female who presents with a chief complaint of abd  pain/  uti (28 Apr 2025 18:14)  no complain  	  REVIEW OF SYSTEMS:  CONSTITUTIONAL: No fever, weight loss, or fatigue  EYES: No eye pain, visual disturbances, or discharge  ENT:  No difficulty hearing, tinnitus, vertigo; No sinus or throat pain  NECK: No pain or stiffness  RESPIRATORY: No cough, wheezing, chills or hemoptysis; No Shortness of Breath  CARDIOVASCULAR: No chest pain, palpitations, passing out, dizziness, or leg swelling  GASTROINTESTINAL: No abdominal or epigastric pain. No nausea, vomiting, or hematemesis; No diarrhea or constipation. No melena or hematochezia.  GENITOURINARY: No dysuria, frequency, hematuria, or incontinence  NEUROLOGICAL: No headaches, memory loss, loss of strength, numbness, or tremors  SKIN: No itching, burning, rashes, or lesions   LYMPH Nodes: No enlarged glands  ENDOCRINE: No heat or cold intolerance; No hair loss  MUSCULOSKELETAL: No joint pain or swelling; No muscle, back, or extremity pain  PSYCHIATRIC: No depression, anxiety, mood swings, or difficulty sleeping  HEME/LYMPH: No easy bruising, or bleeding gums  ALLERGY AND IMMUNOLOGIC: No hives or eczema	    [ ] All others negative	  [x ] Unable to obtain    PHYSICAL EXAM:  T(C): 36.8 (04-29-25 @ 04:45), Max: 37.1 (04-28-25 @ 12:36)  HR: 74 (04-29-25 @ 04:45) (74 - 103)  BP: 125/72 (04-29-25 @ 04:45) (107/63 - 141/78)  RR: 18 (04-29-25 @ 04:45) (17 - 18)  SpO2: 97% (04-29-25 @ 04:45) (96% - 98%)  Wt(kg): --  I&O's Summary    28 Apr 2025 07:01  -  29 Apr 2025 07:00  --------------------------------------------------------  IN: 0 mL / OUT: 1800 mL / NET: -1800 mL        Appearance: Normal	  HEENT:   Normal oral mucosa, PERRL, EOMI	  Lymphatic: No lymphadenopathy  Cardiovascular: Normal S1 S2, No JVD, + murmurs, No edema  Respiratory: rhonchi  Psychiatry: A & O x 2, Mood & affect appropriate  Gastrointestinal:  Soft, Non-tender, + BS	  Skin: No rashes, No ecchymoses, No cyanosis	  Neurologic: Non-focal  Extremities: Normal range of motion, No clubbing, cyanosis or edema  Vascular: Peripheral pulses palpable 2+ bilaterally    MEDICATIONS  (STANDING):  aspirin enteric coated 81 milliGRAM(s) Oral daily  cefTRIAXone   IVPB 1000 milliGRAM(s) IV Intermittent every 24 hours  donepezil 5 milliGRAM(s) Oral at bedtime  escitalopram 20 milliGRAM(s) Oral daily  heparin   Injectable 5000 Unit(s) SubCutaneous every 12 hours  levothyroxine 75 MICROGram(s) Oral daily  oxybutynin 5 milliGRAM(s) Oral daily  polyethylene glycol 3350 17 Gram(s) Oral daily  rosuvastatin 5 milliGRAM(s) Oral at bedtime  senna 2 Tablet(s) Oral at bedtime  sodium chloride 0.9%. 1000 milliLiter(s) (60 mL/Hr) IV Continuous <Continuous>      TELEMETRY: 	    ECG:  	  RADIOLOGY:  OTHER: 	  	  LABS:	 	    CARDIAC MARKERS:                          12.9   8.31  )-----------( 303      ( 28 Apr 2025 14:15 )             40.1     04-28    129[L]  |  97  |  19  ----------------------------<  117[H]  4.9   |  15[L]  |  0.92    Ca    9.1      28 Apr 2025 14:15    TPro  7.0  /  Alb  3.9  /  TBili  0.3  /  DBili  x   /  AST  35  /  ALT  14  /  AlkPhos  66  04-28    proBNP:   Lipid Profile:   HgA1c:   TSH:   PT/INR - ( 28 Apr 2025 14:15 )   PT: 11.8 sec;   INR: 1.04 ratio         PTT - ( 28 Apr 2025 14:15 )  PTT:26.4 sec        Assessment and plan  ---------------------------  85-year-old female history of CAD, hypertension, hyperlipidemia, hypothyroidism, on Plavix, seen at [facility] 2 days ago and diagnosed with UTI, discharged home on Bactrim, had a Smith placed for urinary retention, sent by PMD from assisted living to be admitted to Dr. Pyle for dehydration and IV antibiotics.  Patient also complaining of subjective fevers and chills, no nausea vomiting, states has stable abdominal pain which is not worse.  Patient had  CT performed 2 days ago which was unremarkable except for distended bladder.  Additional history obtained from patient's son over the phone as well as paperwork from the facility that patient was sent from.  pt with sig cardiac and medical hx with hx of CAD, s/p stent, last cath 2 years ago negative.  last echo with mod to severe AS  repeat echo to evaluate AV  avoid hypotension  dvt prophylaxis  asa daily  lipid panel  abx  pt with multiple syncopal episode, will,check the SANDOVAL  check orthostatic  renal function noted, awaiting blood work  physical therapy  awaiting ecg    	        
Date of Service: 05-02-25 @ 07:51           CARDIOLOGY     PROGRESS  NOTE   ________________________________________________  CHIEF COMPLAINT:Patient is a 85y old  Female who presents with a chief complaint of abd  pain/  uti (01 May 2025 15:07)  no complain  	  REVIEW OF SYSTEMS:  CONSTITUTIONAL: No fever, weight loss, or fatigue  EYES: No eye pain, visual disturbances, or discharge  ENT:  No difficulty hearing, tinnitus, vertigo; No sinus or throat pain  NECK: No pain or stiffness  RESPIRATORY: No cough, wheezing, chills or hemoptysis; No Shortness of Breath  CARDIOVASCULAR: No chest pain, palpitations, passing out, dizziness, or leg swelling  GASTROINTESTINAL: No abdominal or epigastric pain. No nausea, vomiting, or hematemesis; No diarrhea or constipation. No melena or hematochezia.  GENITOURINARY: No dysuria, frequency, hematuria, or incontinence  NEUROLOGICAL: No headaches, memory loss, loss of strength, numbness, or tremors  SKIN: No itching, burning, rashes, or lesions   LYMPH Nodes: No enlarged glands  ENDOCRINE: No heat or cold intolerance; No hair loss  MUSCULOSKELETAL: No joint pain or swelling; No muscle, back, or extremity pain  PSYCHIATRIC: No depression, anxiety, mood swings, or difficulty sleeping  HEME/LYMPH: No easy bruising, or bleeding gums  ALLERGY AND IMMUNOLOGIC: No hives or eczema	    [ x] All others negative	  [ ] Unable to obtain    PHYSICAL EXAM:  T(C): 36.7 (05-02-25 @ 04:31), Max: 36.9 (05-01-25 @ 12:06)  HR: 75 (05-02-25 @ 04:31) (68 - 75)  BP: 111/71 (05-02-25 @ 04:31) (111/71 - 159/80)  RR: 18 (05-02-25 @ 04:31) (18 - 18)  SpO2: 95% (05-02-25 @ 04:31) (95% - 98%)  Wt(kg): --  I&O's Summary    01 May 2025 07:01  -  02 May 2025 07:00  --------------------------------------------------------  IN: 840 mL / OUT: 2050 mL / NET: -1210 mL        Appearance: Normal	  HEENT:   Normal oral mucosa, PERRL, EOMI	  Lymphatic: No lymphadenopathy  Cardiovascular: Normal S1 S2, No JVD, + murmurs, No edema  Respiratory:rhonchi  Psychiatry: A & O x 3, Mood & affect appropriate  Gastrointestinal:  Soft, Non-tender, + BS	  Skin: No rashes, No ecchymoses, No cyanosis	  Neurologic: Non-focal  Extremities: Normal range of motion, No clubbing, cyanosis or edema  Vascular: Peripheral pulses palpable 2+ bilaterally    MEDICATIONS  (STANDING):  aspirin enteric coated 81 milliGRAM(s) Oral daily  donepezil 5 milliGRAM(s) Oral at bedtime  escitalopram 20 milliGRAM(s) Oral daily  heparin   Injectable 5000 Unit(s) SubCutaneous every 12 hours  levothyroxine 75 MICROGram(s) Oral daily  lidocaine   4% Patch 1 Patch Transdermal daily  metoprolol succinate ER 25 milliGRAM(s) Oral daily  oxybutynin 5 milliGRAM(s) Oral daily  polyethylene glycol 3350 17 Gram(s) Oral daily  rosuvastatin 5 milliGRAM(s) Oral at bedtime  senna 2 Tablet(s) Oral at bedtime      TELEMETRY: 	    ECG:  	  RADIOLOGY:  OTHER: 	  	  LABS:	 	    CARDIAC MARKERS:      proBNP:   Lipid Profile:   HgA1c:   TSH:         Assessment and plan  ---------------------------  85-year-old female history of CAD, hypertension, hyperlipidemia, hypothyroidism, on Plavix, seen at [facility] 2 days ago and diagnosed with UTI, discharged home on Bactrim, had a Smith placed for urinary retention, sent by PMD from assisted living to be admitted to Dr. Pyle for dehydration and IV antibiotics.  Patient also complaining of subjective fevers and chills, no nausea vomiting, states has stable abdominal pain which is not worse.  Patient had  CT performed 2 days ago which was unremarkable except for distended bladder.  Additional history obtained from patient's son over the phone as well as paperwork from the facility that patient was sent from.  pt with sig cardiac and medical hx with hx of CAD, s/p stent, last cath 2 years ago negative.  last echo with mod to severe AS, s/p TAVR  repeat echo to evaluate AV  avoid hypotension  dvt prophylaxis  asa daily  lipid panel  pt with multiple syncopal episode, will, check the   check orthostatic  renal function noted, awaiting blood work, normalized  physical therapy  awaiting ecg  dc ivf, add metoprolol er 25 mg daily  s/p TAVR  at [facility]   check orthostatic, awaiting  ILR will be checked by PMD, no objection for discharge cardiac wise  asa daily, check tsh/ TOV  	    	        
Date of Service: 04-30-25 @ 07:34           CARDIOLOGY     PROGRESS  NOTE   ________________________________________________  CHIEF COMPLAINT:Patient is a 85y old  Female who presents with a chief complaint of abd  pain/  uti (29 Apr 2025 17:53)  comfortable  	  REVIEW OF SYSTEMS:  CONSTITUTIONAL: No fever, weight loss, or fatigue  EYES: No eye pain, visual disturbances, or discharge  ENT:  No difficulty hearing, tinnitus, vertigo; No sinus or throat pain  NECK: No pain or stiffness  RESPIRATORY: No cough, wheezing, chills or hemoptysis; No Shortness of Breath  CARDIOVASCULAR: No chest pain, palpitations, passing out, dizziness, or leg swelling  GASTROINTESTINAL: No abdominal or epigastric pain. No nausea, vomiting, or hematemesis; No diarrhea or constipation. No melena or hematochezia.  GENITOURINARY: No dysuria, frequency, hematuria, or incontinence  NEUROLOGICAL: No headaches, memory loss, loss of strength, numbness, or tremors  SKIN: No itching, burning, rashes, or lesions   LYMPH Nodes: No enlarged glands  ENDOCRINE: No heat or cold intolerance; No hair loss  MUSCULOSKELETAL: No joint pain or swelling; No muscle, back, or extremity pain  PSYCHIATRIC: No depression, anxiety, mood swings, or difficulty sleeping  HEME/LYMPH: No easy bruising, or bleeding gums  ALLERGY AND IMMUNOLOGIC: No hives or eczema	    [x ] All others negative	  [ ] Unable to obtain    PHYSICAL EXAM:  T(C): 36.7 (04-30-25 @ 05:05), Max: 36.9 (04-29-25 @ 19:55)  HR: 101 (04-30-25 @ 05:05) (69 - 101)  BP: 146/83 (04-30-25 @ 05:05) (124/73 - 146/83)  RR: 18 (04-30-25 @ 05:05) (18 - 18)  SpO2: 95% (04-30-25 @ 05:05) (95% - 97%)  Wt(kg): --  I&O's Summary    29 Apr 2025 07:01  -  30 Apr 2025 07:00  --------------------------------------------------------  IN: 240 mL / OUT: 1300 mL / NET: -1060 mL        Appearance: Normal	  HEENT:   Normal oral mucosa, PERRL, EOMI	  Lymphatic: No lymphadenopathy  Cardiovascular: Normal S1 S2, No JVD, + murmurs, No edema  Respiratory: rhonchi  Psychiatry: A & O x 3, Mood & affect appropriate  Gastrointestinal:  Soft, Non-tender, + BS	  Skin: No rashes, No ecchymoses, No cyanosis	  Extremities:No clubbing, cyanosis or edema  Vascular: Peripheral pulses palpable 2+ bilaterally    MEDICATIONS  (STANDING):  aspirin enteric coated 81 milliGRAM(s) Oral daily  cefTRIAXone   IVPB 1000 milliGRAM(s) IV Intermittent every 24 hours  donepezil 5 milliGRAM(s) Oral at bedtime  escitalopram 20 milliGRAM(s) Oral daily  heparin   Injectable 5000 Unit(s) SubCutaneous every 12 hours  levothyroxine 75 MICROGram(s) Oral daily  oxybutynin 5 milliGRAM(s) Oral daily  polyethylene glycol 3350 17 Gram(s) Oral daily  rosuvastatin 5 milliGRAM(s) Oral at bedtime  senna 2 Tablet(s) Oral at bedtime  sodium chloride 0.9%. 1000 milliLiter(s) (60 mL/Hr) IV Continuous <Continuous>      TELEMETRY: 	    ECG:  	  RADIOLOGY:  OTHER: 	  	  LABS:	 	    CARDIAC MARKERS:                        12.0   6.09  )-----------( 254      ( 29 Apr 2025 07:03 )             39.0     04-29    136  |  102  |  14  ----------------------------<  85  4.0   |  20[L]  |  0.75    Ca    9.0      29 Apr 2025 07:02    TPro  7.0  /  Alb  3.9  /  TBili  0.3  /  DBili  x   /  AST  35  /  ALT  14  /  AlkPhos  66  04-28    proBNP:   Lipid Profile:   HgA1c:   TSH:   PT/INR - ( 28 Apr 2025 14:15 )   PT: 11.8 sec;   INR: 1.04 ratio         PTT - ( 28 Apr 2025 14:15 )  PTT:26.4 sec      Assessment and plan  ---------------------------  85-year-old female history of CAD, hypertension, hyperlipidemia, hypothyroidism, on Plavix, seen at [facility] 2 days ago and diagnosed with UTI, discharged home on Bactrim, had a Smith placed for urinary retention, sent by PMD from assisted living to be admitted to Dr. Pyle for dehydration and IV antibiotics.  Patient also complaining of subjective fevers and chills, no nausea vomiting, states has stable abdominal pain which is not worse.  Patient had  CT performed 2 days ago which was unremarkable except for distended bladder.  Additional history obtained from patient's son over the phone as well as paperwork from the facility that patient was sent from.  pt with sig cardiac and medical hx with hx of CAD, s/p stent, last cath 2 years ago negative.  last echo with mod to severe AS  repeat echo to evaluate AV  avoid hypotension  dvt prophylaxis  asa daily  lipid panel  abx  pt with multiple syncopal episode, will,check the SANDOVAL  check orthostatic  renal function noted, awaiting blood work  physical therapy  awaiting ecg  dc ivf, add metoprolol er 25 mg daily  awaiting echo/ AS  will check ILR  check orthostatic    	        
Date of Service: 05-01-25 @ 07:56           CARDIOLOGY     PROGRESS  NOTE   ________________________________________________  CHIEF COMPLAINT:Patient is a 85y old  Female who presents with a chief complaint of abd  pain/  uti (30 Apr 2025 21:02)  comfortable  	  REVIEW OF SYSTEMS:  CONSTITUTIONAL: No fever, weight loss, or fatigue  EYES: No eye pain, visual disturbances, or discharge  ENT:  No difficulty hearing, tinnitus, vertigo; No sinus or throat pain  NECK: No pain or stiffness  RESPIRATORY: No cough, wheezing, chills or hemoptysis; No Shortness of Breath  CARDIOVASCULAR: No chest pain, palpitations, passing out, dizziness, or leg swelling  GASTROINTESTINAL: No abdominal or epigastric pain. No nausea, vomiting, or hematemesis; No diarrhea or constipation. No melena or hematochezia.  GENITOURINARY: No dysuria, frequency, hematuria, or incontinence  NEUROLOGICAL: No headaches, memory loss, loss of strength, numbness, or tremors  SKIN: No itching, burning, rashes, or lesions   LYMPH Nodes: No enlarged glands  ENDOCRINE: No heat or cold intolerance; No hair loss  MUSCULOSKELETAL: No joint pain or swelling; No muscle, back, or extremity pain  PSYCHIATRIC: No depression, anxiety, mood swings, or difficulty sleeping  HEME/LYMPH: No easy bruising, or bleeding gums  ALLERGY AND IMMUNOLOGIC: No hives or eczema	    [ x] All others negative	  [ ] Unable to obtain    PHYSICAL EXAM:  T(C): 36.9 (05-01-25 @ 05:41), Max: 37 (04-30-25 @ 19:52)  HR: 79 (05-01-25 @ 05:41) (71 - 79)  BP: 126/72 (05-01-25 @ 05:41) (126/72 - 157/79)  RR: 18 (05-01-25 @ 05:41) (18 - 18)  SpO2: 98% (05-01-25 @ 05:41) (98% - 98%)  Wt(kg): --  I&O's Summary    30 Apr 2025 07:01  -  01 May 2025 07:00  --------------------------------------------------------  IN: 240 mL / OUT: 450 mL / NET: -210 mL        Appearance: Normal	  HEENT:   Normal oral mucosa, PERRL, EOMI	  Lymphatic: No lymphadenopathy  Cardiovascular: Normal S1 S2, No JVD, + murmurs, No edema  Respiratory:rhonchi	  Gastrointestinal:  Soft, Non-tender, + BS	  Skin: No rashes, No ecchymoses, No cyanosis	  Neurologic: Non-focal  Extremities: Normal range of motion, No clubbing, cyanosis or edema  Vascular: Peripheral pulses palpable 2+ bilaterally    MEDICATIONS  (STANDING):  aspirin enteric coated 81 milliGRAM(s) Oral daily  donepezil 5 milliGRAM(s) Oral at bedtime  escitalopram 20 milliGRAM(s) Oral daily  heparin   Injectable 5000 Unit(s) SubCutaneous every 12 hours  levothyroxine 75 MICROGram(s) Oral daily  metoprolol succinate ER 25 milliGRAM(s) Oral daily  oxybutynin 5 milliGRAM(s) Oral daily  polyethylene glycol 3350 17 Gram(s) Oral daily  rosuvastatin 5 milliGRAM(s) Oral at bedtime  senna 2 Tablet(s) Oral at bedtime      TELEMETRY: 	    ECG:  	  RADIOLOGY:  OTHER: 	  	  LABS:	 	    CARDIAC MARKERS:        04-30    138  |  104  |  14  ----------------------------<  101[H]  3.8   |  19[L]  |  0.67    Ca    9.1      30 Apr 2025 06:54      proBNP:   Lipid Profile:   HgA1c:   TSH:           Assessment and plan  ---------------------------  85-year-old female history of CAD, hypertension, hyperlipidemia, hypothyroidism, on Plavix, seen at [facility] 2 days ago and diagnosed with UTI, discharged home on Bactrim, had a Smith placed for urinary retention, sent by PMD from assisted living to be admitted to Dr. Pyle for dehydration and IV antibiotics.  Patient also complaining of subjective fevers and chills, no nausea vomiting, states has stable abdominal pain which is not worse.  Patient had  CT performed 2 days ago which was unremarkable except for distended bladder.  Additional history obtained from patient's son over the phone as well as paperwork from the facility that patient was sent from.  pt with sig cardiac and medical hx with hx of CAD, s/p stent, last cath 2 years ago negative.  last echo with mod to severe AS, s/p TAVR  repeat echo to evaluate AV  avoid hypotension  dvt prophylaxis  asa daily  lipid panel  pt with multiple syncopal episode, will, check the   check orthostatic  renal function noted, awaiting blood work, normalized  physical therapy  awaiting ecg  dc ivf, add metoprolol er 25 mg daily  awaiting echo/ AS  check orthostatic  ILR will be checked by PMD, no objection for discharge cardiac wise

## 2025-05-02 NOTE — DISCHARGE NOTE PROVIDER - NSFOLLOWUPCLINICS_GEN_ALL_ED_FT
[facility] Carab[facility]  [address] [place] [address], [place] [address]  Phone: [phone]  Fax:

## 2025-05-02 NOTE — DISCHARGE NOTE NURSING/CASE MANAGEMENT/SOCIAL WORK - FINANCIAL ASSISTANCE
[facility] provides services at a reduced cost to those who are determined to be eligible through [facility]’s financial assistance program. Information regarding [facility]’s financial assistance program can be found by going to [email or web address] or by calling [phone].

## 2025-05-02 NOTE — DISCHARGE NOTE PROVIDER - NSDCMRMEDTOKEN_GEN_ALL_CORE_FT
acetaminophen 650 mg oral tablet, extended release: 1 tab(s) orally every 8 hours as needed for pain  Aspir 81 oral delayed release tablet: 1 tab(s) orally once a day  bisacodyl 5 mg oral delayed release tablet: 2 tab(s) orally once a day  budesonide 3 mg oral delayed release capsule: 2 cap(s) orally once a day  docusate sodium 100 mg oral capsule: 1 cap(s) orally 3 times a day  DULoxetine 30 mg oral delayed release capsule: 1 cap(s) orally once a day  erythromycin 0.5% ophthalmic ointment: 1 application in the left eye 4 times a day  hydrocortisone 2.5% topical cream: Apply topically to affected area every 12 hours to cheek, forehead, and nose for irritaiton  lidocaine 5% topical film: Apply topically to affected area once a day 12 hours on 12 hours off  Linzess 145 mcg oral capsule: 1 cap(s) orally once a day  melatonin 3 mg oral tablet: 2 tab(s) orally once a day (at bedtime)  metoprolol succinate 25 mg oral tablet, extended release: 1 tab(s) orally once a day  omeprazole 40 mg oral delayed release capsule: 1 cap(s) orally once a day  ondansetron 4 mg oral tablet: 1 tab(s) orally every 6 hours as needed for nausea  oxyBUTYnin 5 mg oral tablet: 1 tab(s) orally once a day  Plavix 75 mg oral tablet: 1 tab(s) orally once a day  polyethylene glycol 3350 oral powder for reconstitution: 17 gram(s) orally 2 times a day  Refresh ophthalmic solution: 1 drop(s) in each eye 4 times a day  rosuvastatin 20 mg oral tablet: 1 tab(s) orally once a day  senna oral tablet: 2 tab(s) orally once a day (at bedtime)  Synthroid 75 mcg (0.075 mg) oral tablet: 1 tab(s) orally once a day  traZODone 50 mg oral tablet: 1 tab(s) orally once a day

## 2025-05-03 LAB
CULTURE RESULTS: SIGNIFICANT CHANGE UP
CULTURE RESULTS: SIGNIFICANT CHANGE UP
SPECIMEN SOURCE: SIGNIFICANT CHANGE UP
SPECIMEN SOURCE: SIGNIFICANT CHANGE UP

## 2025-05-12 ENCOUNTER — EMERGENCY (EMERGENCY)
Facility: HOSPITAL | Age: 85
LOS: 1 days | End: 2025-05-12
Attending: STUDENT IN AN ORGANIZED HEALTH CARE EDUCATION/TRAINING PROGRAM
Payer: MEDICARE

## 2025-05-12 VITALS
OXYGEN SATURATION: 97 % | DIASTOLIC BLOOD PRESSURE: 74 MMHG | TEMPERATURE: 99 F | RESPIRATION RATE: 18 BRPM | HEART RATE: 77 BPM | SYSTOLIC BLOOD PRESSURE: 130 MMHG

## 2025-05-12 VITALS
WEIGHT: 149.91 LBS | OXYGEN SATURATION: 98 % | SYSTOLIC BLOOD PRESSURE: 117 MMHG | TEMPERATURE: 98 F | HEART RATE: 73 BPM | RESPIRATION RATE: 17 BRPM | DIASTOLIC BLOOD PRESSURE: 75 MMHG | HEIGHT: 61 IN

## 2025-05-12 DIAGNOSIS — Z98.82 BREAST IMPLANT STATUS: Chronic | ICD-10-CM

## 2025-05-12 DIAGNOSIS — Z90.710 ACQUIRED ABSENCE OF BOTH CERVIX AND UTERUS: Chronic | ICD-10-CM

## 2025-05-12 DIAGNOSIS — Z98.89 OTHER SPECIFIED POSTPROCEDURAL STATES: Chronic | ICD-10-CM

## 2025-05-12 DIAGNOSIS — Z90.49 ACQUIRED ABSENCE OF OTHER SPECIFIED PARTS OF DIGESTIVE TRACT: Chronic | ICD-10-CM

## 2025-05-12 DIAGNOSIS — Z98.890 OTHER SPECIFIED POSTPROCEDURAL STATES: Chronic | ICD-10-CM

## 2025-05-12 LAB
C DIFF GDH STL QL: NEGATIVE — SIGNIFICANT CHANGE UP
C DIFF GDH STL QL: SIGNIFICANT CHANGE UP

## 2025-05-12 PROCEDURE — 87324 CLOSTRIDIUM AG IA: CPT

## 2025-05-12 PROCEDURE — 87449 NOS EACH ORGANISM AG IA: CPT

## 2025-05-12 PROCEDURE — 99284 EMERGENCY DEPT VISIT MOD MDM: CPT

## 2025-05-12 PROCEDURE — 99283 EMERGENCY DEPT VISIT LOW MDM: CPT

## 2025-05-12 PROCEDURE — 87077 CULTURE AEROBIC IDENTIFY: CPT

## 2025-05-12 PROCEDURE — 87045 FECES CULTURE AEROBIC BACT: CPT

## 2025-05-12 PROCEDURE — 87046 STOOL CULTR AEROBIC BACT EA: CPT

## 2025-05-12 NOTE — ED ADULT NURSE NOTE - NS ED NURSE RECORD ANOTHER VITAL SIGN
Due to patient being non-English speaking/uses sign language, an  was used for this visit. Only for face-to-face interpretation by an external agency, date and length of interpretation can be found on the scanned worksheet.     name: Krupa Car  Agency: [employer]  Language: Bangladeshi   Telephone number: [phone]  Type of interpretation: Face-to-face, spoken     Efra Hare CMA 7/30/18       1 Yes

## 2025-05-12 NOTE — ED ADULT NURSE REASSESSMENT NOTE - NS ED NURSE REASSESS COMMENT FT1
On re-assessment pt currently appears comfortable resting in stretcher with appropriate side rails up for safety. pt is awake and alert, VSS, breathing even and unlabored, NAD noted at this time. Plan of care discussed with pt. Pt pending nonemergent back to facility.

## 2025-05-12 NOTE — ED PROVIDER NOTE - PATIENT PORTAL LINK FT
You can access the FollowMyHealth Patient Portal offered by [facility] by registering at the following website: [email or web address]. By joining [employer]’s FollowMyHealth portal, you will also be able to view your health information using other applications (apps) compatible with our system.

## 2025-05-12 NOTE — ED PROVIDER NOTE - CLINICAL SUMMARY MEDICAL DECISION MAKING FREE TEXT BOX
Fellow MD Milton Murrell: 85-year-old female with past medical history of CAD, HTN, HLD, hypothyroidism, presenting from [facility] for urine retention.  Patient states that she had Smith removed by her urologist this morning and was advised to return if she was unable to urinate.  Patient states that she has not urinated since the Smith was removed.  Patient presented to the ED and urine retention.  Additionally, patient had 2 large bowel movements.  Patient recently was on Augmentin as well as Bactrim.  She denies fever, chills, nausea, vomiting, abdominal pain, headache, changes in vision, changes in speech, numbness, weakness, shortness of breath, chest pain, and any additional complaint at this time.  No recent travel or sick contacts.      On arrival to the ED, the patient is awake, alert, and oriented x 3.  She is normotensive, heart rate in the 70s, afebrile, and satting 98% on room air.  Abdomen is soft and nontender.  Patient found to be and urine retention.  Smith catheter placed with drainage of 300 cc of urine.  Stool sample sent for C. difficile given recent antibiotics and large volume of diarrhea.  Abdomen is soft and nontender.  Patient nontoxic-appearing.  Plan for discharge home with urology follow-up and return if symptoms worsen.

## 2025-05-12 NOTE — ED ADULT NURSE NOTE - NSFALLHARMRISKINTERV_ED_ALL_ED

## 2025-05-12 NOTE — ED ADULT NURSE NOTE - OBJECTIVE STATEMENT
patient is an 84 y/o F with hx of HLD, HTN, anxiety, back pain, hypothyroidism, back pain BIBEMS for urinary retention. patient states that she had downey placed recently for retention and had catheter removed at 930 this morning and has not had urine output since removal. on arrival indwelling urinary catheter placed using aseptic technique. Sterile equipment utilized. Second RN present to confirm sterility. Draining to gravity - initial output of XXXml. Secured w/ stat lock to upper leg. Explained procedure as it was being done - Pt tolerated procedure well. MD Velasquez at bedside to assess. updated on plan. comfort and safety maintained patient is an 86 y/o F with hx of HLD, HTN, anxiety, back pain, hypothyroidism, back pain BIBEMS for urinary retention. patient states that she had downey placed recently for retention and had catheter removed at 930 this morning and has not had urine output since removal. on arrival indwelling urinary catheter placed using aseptic technique. Sterile equipment utilized. Second RN present to confirm sterility. Draining to gravity - initial output of 600 ml. Secured w/ stat lock to upper leg. Explained procedure as it was being done - Pt tolerated procedure well. MD Velasquez at bedside to assess. updated on plan. comfort and safety maintained

## 2025-05-12 NOTE — ED PROVIDER NOTE - ATTENDING CONTRIBUTION TO CARE
86 yo F with PMHx CAD, HTN, hyperlipidemia, hypothyroidism presents with urinary retention. She reports a failed trial void at inpatient admission last week and once again in the urologists office this morning. Patient also with one episode of diarrhea today. She was recently on augmentin as prescribed by hospitalist on admission     PE: well appearing, nontoxic, no respiratory distress.  Abdomen soft, nontender, nondistended. Neuro nonfocal.  Skin intact. Psych normal mood.    MDM: Differential diagnosis includes but is not limited to urinary retention, UTI, colitis, c diff, anxiety  New downey placed   Sent stool for GI PCR   Advised urology and PCP follow up

## 2025-05-15 LAB
CULTURE RESULTS: SIGNIFICANT CHANGE UP
SPECIMEN SOURCE: SIGNIFICANT CHANGE UP

## 2025-05-22 NOTE — ED PROVIDER NOTE - NEUROLOGICAL, MLM
Normal pap letter sent to pt through My Chart.   Alert and oriented, no focal deficits, no motor or sensory deficits.

## 2025-06-18 ENCOUNTER — APPOINTMENT (OUTPATIENT)
Dept: OTOLARYNGOLOGY | Facility: CLINIC | Age: 85
End: 2025-06-18
Payer: MEDICARE

## 2025-06-18 VITALS
WEIGHT: 115 LBS | DIASTOLIC BLOOD PRESSURE: 77 MMHG | HEART RATE: 77 BPM | HEIGHT: 61 IN | SYSTOLIC BLOOD PRESSURE: 119 MMHG | BODY MASS INDEX: 21.71 KG/M2

## 2025-06-18 PROBLEM — M26.609 TMJ (TEMPOROMANDIBULAR JOINT DISORDER): Status: ACTIVE | Noted: 2025-06-18

## 2025-06-18 PROCEDURE — 99213 OFFICE O/P EST LOW 20 MIN: CPT

## 2025-06-18 RX ORDER — MUPIROCIN 20 MG/G
2 OINTMENT TOPICAL
Qty: 2 | Refills: 2 | Status: ACTIVE | COMMUNITY
Start: 2025-06-18 | End: 1900-01-01

## 2025-06-26 ENCOUNTER — OUTPATIENT (OUTPATIENT)
Dept: OUTPATIENT SERVICES | Facility: HOSPITAL | Age: 85
LOS: 1 days | End: 2025-06-26
Payer: MEDICARE

## 2025-06-26 VITALS
WEIGHT: 115.96 LBS | TEMPERATURE: 98 F | DIASTOLIC BLOOD PRESSURE: 84 MMHG | HEART RATE: 77 BPM | HEIGHT: 61 IN | OXYGEN SATURATION: 99 % | SYSTOLIC BLOOD PRESSURE: 137 MMHG | RESPIRATION RATE: 18 BRPM

## 2025-06-26 DIAGNOSIS — Z98.89 OTHER SPECIFIED POSTPROCEDURAL STATES: Chronic | ICD-10-CM

## 2025-06-26 DIAGNOSIS — Z98.890 OTHER SPECIFIED POSTPROCEDURAL STATES: Chronic | ICD-10-CM

## 2025-06-26 DIAGNOSIS — Z90.710 ACQUIRED ABSENCE OF BOTH CERVIX AND UTERUS: Chronic | ICD-10-CM

## 2025-06-26 DIAGNOSIS — I25.10 ATHEROSCLEROTIC HEART DISEASE OF NATIVE CORONARY ARTERY WITHOUT ANGINA PECTORIS: ICD-10-CM

## 2025-06-26 DIAGNOSIS — Z90.49 ACQUIRED ABSENCE OF OTHER SPECIFIED PARTS OF DIGESTIVE TRACT: Chronic | ICD-10-CM

## 2025-06-26 DIAGNOSIS — Z98.82 BREAST IMPLANT STATUS: Chronic | ICD-10-CM

## 2025-06-26 DIAGNOSIS — H04.122 DRY EYE SYNDROME OF LEFT LACRIMAL GLAND: ICD-10-CM

## 2025-06-26 DIAGNOSIS — Z01.818 ENCOUNTER FOR OTHER PREPROCEDURAL EXAMINATION: ICD-10-CM

## 2025-06-26 DIAGNOSIS — Z95.818 PRESENCE OF OTHER CARDIAC IMPLANTS AND GRAFTS: Chronic | ICD-10-CM

## 2025-06-26 PROCEDURE — G0463: CPT

## 2025-06-26 PROCEDURE — 80048 BASIC METABOLIC PNL TOTAL CA: CPT

## 2025-06-26 PROCEDURE — 85027 COMPLETE CBC AUTOMATED: CPT

## 2025-06-26 NOTE — H&P PST ADULT - HISTORY OF PRESENT ILLNESS
85yr old female with inability to close left eye causing constant eye dryness and irritation. Pt coming in for left upper eyelid skin graft and tarsorrhaphy on 7/7/2025 Pt hx sig for HTN in control HLD CAD stent x1 2023 chronic neck and back pain  hypothyroid. Hx of syncope has loop recorder. WILD had 8 recent iron infusions. 85yr old female with inability to close left eye causing constant eye dryness and irritation. Pt coming in for left upper eyelid skin graft and tarsorrhaphy on 7/7/2025 Pt hx sig for HTN in control HLD CAD stent x1 2023 chronic neck and back pain  hypothyroid. Hx of syncope has loop recorder. WILD had 8 recent iron infusions.    Addendum: Pt. states that she hasn't seen cardiologist in years, doesn't recall the name (could be Dr. Tony Kemp as per the old note). Echo from 02/2023 shows severe aortic stenosis. Case discussed with Dr. Maya (anesthesia), should be re-routed to [facility] OR. Pt. needs to obtain new Echo and cardiac evaluation. Surgeon emailed.  N.M.

## 2025-06-26 NOTE — H&P PST ADULT - NSICDXPASTSURGICALHX_GEN_ALL_CORE_FT
PAST SURGICAL HISTORY:  History of back surgery     History of cosmetic surgery     Implantable loop recorder present     S/P appendectomy     S/P breast augmentation 29 ya, reconstruction 2015    S/P eye surgery 49 ya, blepharoplasty    S/P hysterectomy age 39    S/P sinus surgery 19 ya    S/P tendon repair left hand, 2015

## 2025-06-26 NOTE — H&P PST ADULT - NSICDXPASTMEDICALHX_GEN_ALL_CORE_FT
PAST MEDICAL HISTORY:  Anxiety     CAD (coronary artery disease)     Chronic back pain     Chronic pain     Constipation     Degenerative disc disease, lumbar     Dry eye of left side     H/O corneal abrasion left    History of epistaxis     History of syncope     HLD (hyperlipidemia)     HTN (hypertension)     Hypothyroidism     Insomnia     Stented coronary artery

## 2025-07-13 ENCOUNTER — EMERGENCY (EMERGENCY)
Facility: HOSPITAL | Age: 85
LOS: 1 days | End: 2025-07-13
Attending: EMERGENCY MEDICINE | Admitting: EMERGENCY MEDICINE
Payer: MEDICARE

## 2025-07-13 VITALS
RESPIRATION RATE: 18 BRPM | HEART RATE: 69 BPM | DIASTOLIC BLOOD PRESSURE: 86 MMHG | TEMPERATURE: 99 F | OXYGEN SATURATION: 99 % | SYSTOLIC BLOOD PRESSURE: 164 MMHG

## 2025-07-13 VITALS
TEMPERATURE: 97 F | WEIGHT: 149.91 LBS | SYSTOLIC BLOOD PRESSURE: 123 MMHG | RESPIRATION RATE: 18 BRPM | DIASTOLIC BLOOD PRESSURE: 76 MMHG | HEIGHT: 61 IN | OXYGEN SATURATION: 100 % | HEART RATE: 73 BPM

## 2025-07-13 DIAGNOSIS — Z98.82 BREAST IMPLANT STATUS: Chronic | ICD-10-CM

## 2025-07-13 DIAGNOSIS — Z90.710 ACQUIRED ABSENCE OF BOTH CERVIX AND UTERUS: Chronic | ICD-10-CM

## 2025-07-13 DIAGNOSIS — Z98.89 OTHER SPECIFIED POSTPROCEDURAL STATES: Chronic | ICD-10-CM

## 2025-07-13 DIAGNOSIS — Z90.49 ACQUIRED ABSENCE OF OTHER SPECIFIED PARTS OF DIGESTIVE TRACT: Chronic | ICD-10-CM

## 2025-07-13 DIAGNOSIS — Z98.890 OTHER SPECIFIED POSTPROCEDURAL STATES: Chronic | ICD-10-CM

## 2025-07-13 DIAGNOSIS — Z95.818 PRESENCE OF OTHER CARDIAC IMPLANTS AND GRAFTS: Chronic | ICD-10-CM

## 2025-07-13 PROCEDURE — 72125 CT NECK SPINE W/O DYE: CPT | Mod: 26

## 2025-07-13 PROCEDURE — 70450 CT HEAD/BRAIN W/O DYE: CPT

## 2025-07-13 PROCEDURE — 99284 EMERGENCY DEPT VISIT MOD MDM: CPT

## 2025-07-13 PROCEDURE — 70486 CT MAXILLOFACIAL W/O DYE: CPT

## 2025-07-13 PROCEDURE — 72125 CT NECK SPINE W/O DYE: CPT

## 2025-07-13 PROCEDURE — 99284 EMERGENCY DEPT VISIT MOD MDM: CPT | Mod: 25

## 2025-07-13 PROCEDURE — 70450 CT HEAD/BRAIN W/O DYE: CPT | Mod: 26

## 2025-07-13 PROCEDURE — 70486 CT MAXILLOFACIAL W/O DYE: CPT | Mod: 26

## 2025-07-13 RX ORDER — ACETAMINOPHEN 500 MG/5ML
650 LIQUID (ML) ORAL ONCE
Refills: 0 | Status: COMPLETED | OUTPATIENT
Start: 2025-07-13 | End: 2025-07-13

## 2025-07-13 RX ORDER — BACITRACIN 500 UNIT/G
1 OINTMENT (GRAM) TOPICAL ONCE
Refills: 0 | Status: COMPLETED | OUTPATIENT
Start: 2025-07-13 | End: 2025-07-13

## 2025-07-13 RX ADMIN — Medication 1 APPLICATION(S): at 19:33

## 2025-07-13 RX ADMIN — Medication 650 MILLIGRAM(S): at 19:31

## 2025-07-13 NOTE — ED PROVIDER NOTE - CLINICAL SUMMARY MEDICAL DECISION MAKING FREE TEXT BOX
85 year old female with PMHX of hypothyroidism, hyperlipidemia, prosthetic heart valve, alzheimer, seizures, hypertension, GERD, arthritis, falls, depression, gastric ulcer and IBS presents today via ambulance from the [facility] after a fall. 85 year old female with PMHX of hypothyroidism, hyperlipidemia, prosthetic heart valve, alzheimer, seizures, hypertension, GERD, arthritis, falls, depression, gastric ulcer and IBS presents today via ambulance from the [facility] after a fall. Patient is awake and alert, no acute distress, noted right periorbital above the eyebrow hematoma and abrasion, nasal abrasion, no deformity, left knee skin tear, bleeding controlled, patient able to move all joints, abdomen soft nontender, will follow-up CT head, CT cervical spine, CT maxillofacial, clean wounds with normal saline, apply bacitracin, give tylenol and reevaluate.

## 2025-07-13 NOTE — ED PROVIDER NOTE - CARE PLAN
Assessment and plan of treatment:	CT head, Cervical spine, Maxillofacial pending, labs pending, Tylenol ordered for pain control   1 Principal Discharge DX:	Abrasion of periorbital region of face  Assessment and plan of treatment:	CT head, Cervical spine, Maxillofacial pending, labs pending, Tylenol ordered for pain control  Secondary Diagnosis:	Abrasion of nose  Secondary Diagnosis:	Skin tear of left lower leg without complication, initial encounter  Secondary Diagnosis:	Fall at home

## 2025-07-13 NOTE — ED PROVIDER NOTE - PATIENT PORTAL LINK FT
You can access the FollowMyHealth Patient Portal offered by [facility] by registering at the following website: [email or web address]. By joining [employer]’s FollowMyHealth portal, you will also be able to view your health information using other applications (apps) compatible with our system.

## 2025-07-13 NOTE — ED ADULT NURSE NOTE - NSFALLHARMRISKINTERV_ED_ALL_ED

## 2025-07-13 NOTE — ED PROVIDER NOTE - OBJECTIVE STATEMENT
85 year old female with PMHX of hypothyroidism, hyperlipidemia, prosthetic heart valve, alzheimer, seizures, hypertension, GERD, arthritis, falls, depression, gastric ulcer and IBS presents today via ambulance from the [facility] after a fall. Patient reports leaving the patio and slipping and falling on the way to the elevator found by nurse who called 911. Reports hitting her head and falling face forward. Currently on aspirin and Plavix. Denies any LOC, chest pain and SOB.

## 2025-07-13 NOTE — ED ADULT NURSE REASSESSMENT NOTE - NS ED NURSE REASSESS COMMENT FT1
Jeremy at [facility] made aware of pt returning at this time with [facility] present. Person on receiving end stated "Sounds good, I'll let the staff know. We'll see her here." Charge RN and [facility] transporters made aware.

## 2025-07-13 NOTE — ED ADULT NURSE REASSESSMENT NOTE - NS ED NURSE REASSESS COMMENT FT1
Pt received by CONCETTA Hernandez. Introduced self to pt and updated pt on plan of care. Pt verbalized understanding. Pt awaiting for CTr at this time. Bacitracin applied to pt's wounds. Bed at lowest height, call bell within reach, AOx3, denies pain or discomfort at this time.

## 2025-07-13 NOTE — ED PROVIDER NOTE - CARE PROVIDER_API CALL
Leroy Harrell  Internal Medicine  [address] [facility], [address] [place] [id number]  Phone: [phone]  Fax: [phone]  Follow Up Time:

## 2025-07-13 NOTE — ED PROVIDER NOTE - PROGRESS NOTE DETAILS
Simple: Patient demonstrates quick and easy understanding/Verbalized Understanding
Imaging results reviewed with patient and son who was on patient cell phone, no acute findings, patient was educated to continue bacitracin, apply ice to assist with hematoma. All understanding verbalized.

## 2025-07-13 NOTE — ED ADULT NURSE NOTE - OBJECTIVE STATEMENT
Pt received in bed alert and oriented and resting in bed with the trip and fall in which she sustained a hematoma to right sided forehead that extends to the right orbit. Pt also had abrasion to left knee. Pt stable and nursing care ongoing and safety maintained.

## 2025-07-14 PROBLEM — Z87.898 PERSONAL HISTORY OF OTHER SPECIFIED CONDITIONS: Chronic | Status: ACTIVE | Noted: 2025-06-26

## 2025-07-14 PROBLEM — Z95.5 PRESENCE OF CORONARY ANGIOPLASTY IMPLANT AND GRAFT: Chronic | Status: ACTIVE | Noted: 2025-06-26

## 2025-07-23 ENCOUNTER — APPOINTMENT (OUTPATIENT)
Dept: OTOLARYNGOLOGY | Facility: CLINIC | Age: 85
End: 2025-07-23

## 2025-08-08 ENCOUNTER — INPATIENT (INPATIENT)
Facility: HOSPITAL | Age: 85
LOS: 7 days | Discharge: NURSING, REHAB OR LONG-TERM CARE | DRG: 392 | End: 2025-08-16
Attending: FAMILY MEDICINE | Admitting: FAMILY MEDICINE
Payer: MEDICARE

## 2025-08-08 VITALS
HEIGHT: 61 IN | WEIGHT: 115.08 LBS | OXYGEN SATURATION: 98 % | SYSTOLIC BLOOD PRESSURE: 119 MMHG | TEMPERATURE: 98 F | HEART RATE: 103 BPM | RESPIRATION RATE: 16 BRPM | DIASTOLIC BLOOD PRESSURE: 86 MMHG

## 2025-08-08 DIAGNOSIS — Z90.710 ACQUIRED ABSENCE OF BOTH CERVIX AND UTERUS: Chronic | ICD-10-CM

## 2025-08-08 DIAGNOSIS — Z95.818 PRESENCE OF OTHER CARDIAC IMPLANTS AND GRAFTS: Chronic | ICD-10-CM

## 2025-08-08 DIAGNOSIS — Z98.890 OTHER SPECIFIED POSTPROCEDURAL STATES: Chronic | ICD-10-CM

## 2025-08-08 DIAGNOSIS — Z90.49 ACQUIRED ABSENCE OF OTHER SPECIFIED PARTS OF DIGESTIVE TRACT: Chronic | ICD-10-CM

## 2025-08-08 DIAGNOSIS — Z98.89 OTHER SPECIFIED POSTPROCEDURAL STATES: Chronic | ICD-10-CM

## 2025-08-08 DIAGNOSIS — Z98.82 BREAST IMPLANT STATUS: Chronic | ICD-10-CM

## 2025-08-08 DIAGNOSIS — R10.9 UNSPECIFIED ABDOMINAL PAIN: ICD-10-CM

## 2025-08-08 LAB
ALBUMIN SERPL ELPH-MCNC: 5 G/DL — SIGNIFICANT CHANGE UP (ref 3.3–5)
ALP SERPL-CCNC: 84 U/L — SIGNIFICANT CHANGE UP (ref 40–120)
ALT FLD-CCNC: 25 U/L — SIGNIFICANT CHANGE UP (ref 12–78)
ANION GAP SERPL CALC-SCNC: 12 MMOL/L — SIGNIFICANT CHANGE UP (ref 5–17)
APPEARANCE UR: ABNORMAL
AST SERPL-CCNC: 22 U/L — SIGNIFICANT CHANGE UP (ref 15–37)
BACTERIA # UR AUTO: ABNORMAL /HPF
BASOPHILS # BLD AUTO: 0.05 K/UL — SIGNIFICANT CHANGE UP (ref 0–0.2)
BASOPHILS NFR BLD AUTO: 0.4 % — SIGNIFICANT CHANGE UP (ref 0–2)
BILIRUB SERPL-MCNC: 0.5 MG/DL — SIGNIFICANT CHANGE UP (ref 0.2–1.2)
BILIRUB UR-MCNC: NEGATIVE — SIGNIFICANT CHANGE UP
BLD GP AB SCN SERPL QL: SIGNIFICANT CHANGE UP
BUN SERPL-MCNC: 24 MG/DL — HIGH (ref 7–23)
CALCIUM SERPL-MCNC: 10.3 MG/DL — HIGH (ref 8.5–10.1)
CHLORIDE SERPL-SCNC: 105 MMOL/L — SIGNIFICANT CHANGE UP (ref 96–108)
CO2 SERPL-SCNC: 22 MMOL/L — SIGNIFICANT CHANGE UP (ref 22–31)
COLOR SPEC: YELLOW — SIGNIFICANT CHANGE UP
CREAT SERPL-MCNC: 0.95 MG/DL — SIGNIFICANT CHANGE UP (ref 0.5–1.3)
DIFF PNL FLD: NEGATIVE — SIGNIFICANT CHANGE UP
EGFR: 58 ML/MIN/1.73M2 — LOW
EGFR: 58 ML/MIN/1.73M2 — LOW
EOSINOPHIL # BLD AUTO: 0.07 K/UL — SIGNIFICANT CHANGE UP (ref 0–0.5)
EOSINOPHIL NFR BLD AUTO: 0.5 % — SIGNIFICANT CHANGE UP (ref 0–6)
EPI CELLS # UR: PRESENT
GLUCOSE SERPL-MCNC: 157 MG/DL — HIGH (ref 70–99)
GLUCOSE UR QL: NEGATIVE MG/DL — SIGNIFICANT CHANGE UP
HCT VFR BLD CALC: 46.9 % — HIGH (ref 34.5–45)
HGB BLD-MCNC: 15 G/DL — SIGNIFICANT CHANGE UP (ref 11.5–15.5)
IMM GRANULOCYTES # BLD AUTO: 0.05 K/UL — SIGNIFICANT CHANGE UP (ref 0–0.07)
IMM GRANULOCYTES NFR BLD AUTO: 0.4 % — SIGNIFICANT CHANGE UP (ref 0–0.9)
KETONES UR QL: 15 MG/DL
LACTATE SERPL-SCNC: 1.7 MMOL/L — SIGNIFICANT CHANGE UP (ref 0.7–2)
LACTATE SERPL-SCNC: 2.1 MMOL/L — HIGH (ref 0.7–2)
LEUKOCYTE ESTERASE UR-ACNC: ABNORMAL
LIDOCAIN IGE QN: 26 U/L — SIGNIFICANT CHANGE UP (ref 13–75)
LYMPHOCYTES # BLD AUTO: 0.82 K/UL — LOW (ref 1–3.3)
LYMPHOCYTES NFR BLD AUTO: 6 % — LOW (ref 13–44)
MCHC RBC-ENTMCNC: 27.8 PG — SIGNIFICANT CHANGE UP (ref 27–34)
MCHC RBC-ENTMCNC: 32 G/DL — SIGNIFICANT CHANGE UP (ref 32–36)
MCV RBC AUTO: 86.9 FL — SIGNIFICANT CHANGE UP (ref 80–100)
MONOCYTES # BLD AUTO: 0.7 K/UL — SIGNIFICANT CHANGE UP (ref 0–0.9)
MONOCYTES NFR BLD AUTO: 5.1 % — SIGNIFICANT CHANGE UP (ref 2–14)
NEUTROPHILS # BLD AUTO: 11.99 K/UL — HIGH (ref 1.8–7.4)
NEUTROPHILS NFR BLD AUTO: 87.6 % — HIGH (ref 43–77)
NITRITE UR-MCNC: POSITIVE
NRBC # BLD AUTO: 0 K/UL — SIGNIFICANT CHANGE UP (ref 0–0)
NRBC # FLD: 0 K/UL — SIGNIFICANT CHANGE UP (ref 0–0)
NRBC BLD AUTO-RTO: 0 /100 WBCS — SIGNIFICANT CHANGE UP (ref 0–0)
OB PNL STL: NEGATIVE — SIGNIFICANT CHANGE UP
PH UR: 7 — SIGNIFICANT CHANGE UP (ref 5–8)
PLATELET # BLD AUTO: 276 K/UL — SIGNIFICANT CHANGE UP (ref 150–400)
PMV BLD: 10.1 FL — SIGNIFICANT CHANGE UP (ref 7–13)
POTASSIUM SERPL-MCNC: 4.2 MMOL/L — SIGNIFICANT CHANGE UP (ref 3.5–5.3)
POTASSIUM SERPL-SCNC: 4.2 MMOL/L — SIGNIFICANT CHANGE UP (ref 3.5–5.3)
PROT SERPL-MCNC: 8.7 G/DL — HIGH (ref 6–8.3)
PROT UR-MCNC: NEGATIVE MG/DL — SIGNIFICANT CHANGE UP
RBC # BLD: 5.4 M/UL — HIGH (ref 3.8–5.2)
RBC # FLD: 13.8 % — SIGNIFICANT CHANGE UP (ref 10.3–14.5)
RBC CASTS # UR COMP ASSIST: 3 /HPF — SIGNIFICANT CHANGE UP (ref 0–4)
SODIUM SERPL-SCNC: 139 MMOL/L — SIGNIFICANT CHANGE UP (ref 135–145)
SP GR SPEC: 1.03 — SIGNIFICANT CHANGE UP (ref 1–1.03)
UROBILINOGEN FLD QL: 0.2 MG/DL — SIGNIFICANT CHANGE UP (ref 0.2–1)
WBC # BLD: 13.68 K/UL — HIGH (ref 3.8–10.5)
WBC # FLD AUTO: 13.68 K/UL — HIGH (ref 3.8–10.5)
WBC UR QL: 32 /HPF — HIGH (ref 0–5)

## 2025-08-08 PROCEDURE — 71045 X-RAY EXAM CHEST 1 VIEW: CPT

## 2025-08-08 PROCEDURE — 36415 COLL VENOUS BLD VENIPUNCTURE: CPT

## 2025-08-08 PROCEDURE — 93010 ELECTROCARDIOGRAM REPORT: CPT

## 2025-08-08 PROCEDURE — 74177 CT ABD & PELVIS W/CONTRAST: CPT

## 2025-08-08 PROCEDURE — 86901 BLOOD TYPING SEROLOGIC RH(D): CPT

## 2025-08-08 PROCEDURE — 71045 X-RAY EXAM CHEST 1 VIEW: CPT | Mod: 26

## 2025-08-08 PROCEDURE — 86850 RBC ANTIBODY SCREEN: CPT

## 2025-08-08 PROCEDURE — 99223 1ST HOSP IP/OBS HIGH 75: CPT

## 2025-08-08 PROCEDURE — 86900 BLOOD TYPING SEROLOGIC ABO: CPT

## 2025-08-08 PROCEDURE — 85025 COMPLETE CBC W/AUTO DIFF WBC: CPT

## 2025-08-08 PROCEDURE — 83605 ASSAY OF LACTIC ACID: CPT

## 2025-08-08 PROCEDURE — 99285 EMERGENCY DEPT VISIT HI MDM: CPT

## 2025-08-08 PROCEDURE — 81001 URINALYSIS AUTO W/SCOPE: CPT

## 2025-08-08 PROCEDURE — 80053 COMPREHEN METABOLIC PANEL: CPT

## 2025-08-08 PROCEDURE — 74177 CT ABD & PELVIS W/CONTRAST: CPT | Mod: 26

## 2025-08-08 PROCEDURE — 83690 ASSAY OF LIPASE: CPT

## 2025-08-08 PROCEDURE — 82272 OCCULT BLD FECES 1-3 TESTS: CPT

## 2025-08-08 PROCEDURE — 70450 CT HEAD/BRAIN W/O DYE: CPT

## 2025-08-08 PROCEDURE — 70450 CT HEAD/BRAIN W/O DYE: CPT | Mod: 26

## 2025-08-08 RX ORDER — MELATONIN 5 MG
3 TABLET ORAL AT BEDTIME
Refills: 0 | Status: DISCONTINUED | OUTPATIENT
Start: 2025-08-08 | End: 2025-08-16

## 2025-08-08 RX ORDER — MAGNESIUM, ALUMINUM HYDROXIDE 200-200 MG
30 TABLET,CHEWABLE ORAL EVERY 4 HOURS
Refills: 0 | Status: DISCONTINUED | OUTPATIENT
Start: 2025-08-08 | End: 2025-08-16

## 2025-08-08 RX ORDER — TAMSULOSIN HYDROCHLORIDE 0.4 MG/1
0.4 CAPSULE ORAL AT BEDTIME
Refills: 0 | Status: DISCONTINUED | OUTPATIENT
Start: 2025-08-08 | End: 2025-08-16

## 2025-08-08 RX ORDER — DULOXETINE 20 MG/1
30 CAPSULE, DELAYED RELEASE ORAL DAILY
Refills: 0 | Status: DISCONTINUED | OUTPATIENT
Start: 2025-08-08 | End: 2025-08-10

## 2025-08-08 RX ORDER — TRAZODONE HCL 100 MG
50 TABLET ORAL DAILY
Refills: 0 | Status: DISCONTINUED | OUTPATIENT
Start: 2025-08-08 | End: 2025-08-16

## 2025-08-08 RX ORDER — ONDANSETRON HCL/PF 4 MG/2 ML
4 VIAL (ML) INJECTION EVERY 8 HOURS
Refills: 0 | Status: DISCONTINUED | OUTPATIENT
Start: 2025-08-08 | End: 2025-08-09

## 2025-08-08 RX ORDER — ROSUVASTATIN CALCIUM 20 MG/1
20 TABLET, FILM COATED ORAL AT BEDTIME
Refills: 0 | Status: DISCONTINUED | OUTPATIENT
Start: 2025-08-08 | End: 2025-08-16

## 2025-08-08 RX ORDER — ACETAMINOPHEN 500 MG/5ML
1000 LIQUID (ML) ORAL ONCE
Refills: 0 | Status: COMPLETED | OUTPATIENT
Start: 2025-08-08 | End: 2025-08-08

## 2025-08-08 RX ORDER — ACETAMINOPHEN 500 MG/5ML
650 LIQUID (ML) ORAL EVERY 6 HOURS
Refills: 0 | Status: DISCONTINUED | OUTPATIENT
Start: 2025-08-08 | End: 2025-08-16

## 2025-08-08 RX ORDER — LEVOTHYROXINE SODIUM 300 MCG
75 TABLET ORAL DAILY
Refills: 0 | Status: DISCONTINUED | OUTPATIENT
Start: 2025-08-08 | End: 2025-08-16

## 2025-08-08 RX ORDER — HEPARIN SODIUM 1000 [USP'U]/ML
5000 INJECTION INTRAVENOUS; SUBCUTANEOUS EVERY 12 HOURS
Refills: 0 | Status: DISCONTINUED | OUTPATIENT
Start: 2025-08-08 | End: 2025-08-16

## 2025-08-08 RX ORDER — METOPROLOL SUCCINATE 50 MG/1
25 TABLET, EXTENDED RELEASE ORAL DAILY
Refills: 0 | Status: DISCONTINUED | OUTPATIENT
Start: 2025-08-08 | End: 2025-08-16

## 2025-08-08 RX ORDER — ONDANSETRON HCL/PF 4 MG/2 ML
4 VIAL (ML) INJECTION ONCE
Refills: 0 | Status: COMPLETED | OUTPATIENT
Start: 2025-08-08 | End: 2025-08-08

## 2025-08-08 RX ORDER — CLOPIDOGREL BISULFATE 75 MG/1
75 TABLET, FILM COATED ORAL DAILY
Refills: 0 | Status: DISCONTINUED | OUTPATIENT
Start: 2025-08-08 | End: 2025-08-16

## 2025-08-08 RX ORDER — CEFTRIAXONE 500 MG/1
1000 INJECTION, POWDER, FOR SOLUTION INTRAMUSCULAR; INTRAVENOUS ONCE
Refills: 0 | Status: COMPLETED | OUTPATIENT
Start: 2025-08-08 | End: 2025-08-08

## 2025-08-08 RX ADMIN — ROSUVASTATIN CALCIUM 20 MILLIGRAM(S): 20 TABLET, FILM COATED ORAL at 23:37

## 2025-08-08 RX ADMIN — Medication 400 MILLIGRAM(S): at 17:39

## 2025-08-08 RX ADMIN — CEFTRIAXONE 100 MILLIGRAM(S): 500 INJECTION, POWDER, FOR SOLUTION INTRAMUSCULAR; INTRAVENOUS at 22:04

## 2025-08-08 RX ADMIN — Medication 50 MILLILITER(S): at 23:38

## 2025-08-08 RX ADMIN — Medication 1000 MILLILITER(S): at 17:39

## 2025-08-08 RX ADMIN — Medication 4 MILLIGRAM(S): at 17:39

## 2025-08-08 RX ADMIN — Medication 1000 MILLIGRAM(S): at 18:48

## 2025-08-08 RX ADMIN — Medication 1000 MILLILITER(S): at 18:40

## 2025-08-09 DIAGNOSIS — I10 ESSENTIAL (PRIMARY) HYPERTENSION: ICD-10-CM

## 2025-08-09 DIAGNOSIS — E03.9 HYPOTHYROIDISM, UNSPECIFIED: ICD-10-CM

## 2025-08-09 DIAGNOSIS — R10.9 UNSPECIFIED ABDOMINAL PAIN: ICD-10-CM

## 2025-08-09 DIAGNOSIS — I25.10 ATHEROSCLEROTIC HEART DISEASE OF NATIVE CORONARY ARTERY WITHOUT ANGINA PECTORIS: ICD-10-CM

## 2025-08-09 DIAGNOSIS — R29.6 REPEATED FALLS: ICD-10-CM

## 2025-08-09 DIAGNOSIS — N39.0 URINARY TRACT INFECTION, SITE NOT SPECIFIED: ICD-10-CM

## 2025-08-09 LAB
A1C WITH ESTIMATED AVERAGE GLUCOSE RESULT: 6.1 % — HIGH (ref 4–5.6)
ALBUMIN SERPL ELPH-MCNC: 3 G/DL — LOW (ref 3.3–5)
ALBUMIN SERPL ELPH-MCNC: 3.6 G/DL — SIGNIFICANT CHANGE UP (ref 3.3–5)
ALP SERPL-CCNC: 54 U/L — SIGNIFICANT CHANGE UP (ref 40–120)
ALP SERPL-CCNC: 65 U/L — SIGNIFICANT CHANGE UP (ref 40–120)
ALT FLD-CCNC: 14 U/L — SIGNIFICANT CHANGE UP (ref 12–78)
ALT FLD-CCNC: 18 U/L — SIGNIFICANT CHANGE UP (ref 12–78)
ANION GAP SERPL CALC-SCNC: 5 MMOL/L — SIGNIFICANT CHANGE UP (ref 5–17)
ANION GAP SERPL CALC-SCNC: 5 MMOL/L — SIGNIFICANT CHANGE UP (ref 5–17)
AST SERPL-CCNC: 13 U/L — LOW (ref 15–37)
AST SERPL-CCNC: 13 U/L — LOW (ref 15–37)
BASOPHILS # BLD AUTO: 0.03 K/UL — SIGNIFICANT CHANGE UP (ref 0–0.2)
BASOPHILS NFR BLD AUTO: 0.3 % — SIGNIFICANT CHANGE UP (ref 0–2)
BILIRUB SERPL-MCNC: 0.3 MG/DL — SIGNIFICANT CHANGE UP (ref 0.2–1.2)
BILIRUB SERPL-MCNC: 0.5 MG/DL — SIGNIFICANT CHANGE UP (ref 0.2–1.2)
BUN SERPL-MCNC: 13 MG/DL — SIGNIFICANT CHANGE UP (ref 7–23)
BUN SERPL-MCNC: 14 MG/DL — SIGNIFICANT CHANGE UP (ref 7–23)
CALCIUM SERPL-MCNC: 8.3 MG/DL — LOW (ref 8.5–10.1)
CALCIUM SERPL-MCNC: 8.8 MG/DL — SIGNIFICANT CHANGE UP (ref 8.5–10.1)
CHLORIDE SERPL-SCNC: 108 MMOL/L — SIGNIFICANT CHANGE UP (ref 96–108)
CHLORIDE SERPL-SCNC: 115 MMOL/L — HIGH (ref 96–108)
CHOLEST SERPL-MCNC: 138 MG/DL — SIGNIFICANT CHANGE UP
CO2 SERPL-SCNC: 23 MMOL/L — SIGNIFICANT CHANGE UP (ref 22–31)
CO2 SERPL-SCNC: 27 MMOL/L — SIGNIFICANT CHANGE UP (ref 22–31)
CREAT SERPL-MCNC: 0.61 MG/DL — SIGNIFICANT CHANGE UP (ref 0.5–1.3)
CREAT SERPL-MCNC: 0.71 MG/DL — SIGNIFICANT CHANGE UP (ref 0.5–1.3)
EGFR: 83 ML/MIN/1.73M2 — SIGNIFICANT CHANGE UP
EGFR: 83 ML/MIN/1.73M2 — SIGNIFICANT CHANGE UP
EGFR: 87 ML/MIN/1.73M2 — SIGNIFICANT CHANGE UP
EGFR: 87 ML/MIN/1.73M2 — SIGNIFICANT CHANGE UP
EOSINOPHIL # BLD AUTO: 0.01 K/UL — SIGNIFICANT CHANGE UP (ref 0–0.5)
EOSINOPHIL NFR BLD AUTO: 0.1 % — SIGNIFICANT CHANGE UP (ref 0–6)
ESTIMATED AVERAGE GLUCOSE: 128 MG/DL — HIGH (ref 68–114)
GLUCOSE SERPL-MCNC: 122 MG/DL — HIGH (ref 70–99)
GLUCOSE SERPL-MCNC: 143 MG/DL — HIGH (ref 70–99)
HCT VFR BLD CALC: 32.5 % — LOW (ref 34.5–45)
HCT VFR BLD CALC: 41 % — SIGNIFICANT CHANGE UP (ref 34.5–45)
HDLC SERPL-MCNC: 68 MG/DL — SIGNIFICANT CHANGE UP
HGB BLD-MCNC: 10.5 G/DL — LOW (ref 11.5–15.5)
HGB BLD-MCNC: 13.1 G/DL — SIGNIFICANT CHANGE UP (ref 11.5–15.5)
IMM GRANULOCYTES # BLD AUTO: 0.04 K/UL — SIGNIFICANT CHANGE UP (ref 0–0.07)
IMM GRANULOCYTES NFR BLD AUTO: 0.4 % — SIGNIFICANT CHANGE UP (ref 0–0.9)
LACTATE SERPL-SCNC: 0.8 MMOL/L — SIGNIFICANT CHANGE UP (ref 0.7–2)
LDLC SERPL-MCNC: 58 MG/DL — SIGNIFICANT CHANGE UP
LIPID PNL WITH DIRECT LDL SERPL: 58 MG/DL — SIGNIFICANT CHANGE UP
LYMPHOCYTES # BLD AUTO: 1.43 K/UL — SIGNIFICANT CHANGE UP (ref 1–3.3)
LYMPHOCYTES NFR BLD AUTO: 14 % — SIGNIFICANT CHANGE UP (ref 13–44)
MAGNESIUM SERPL-MCNC: 2.1 MG/DL — SIGNIFICANT CHANGE UP (ref 1.6–2.6)
MAGNESIUM SERPL-MCNC: 2.2 MG/DL — SIGNIFICANT CHANGE UP (ref 1.6–2.6)
MCHC RBC-ENTMCNC: 27.8 PG — SIGNIFICANT CHANGE UP (ref 27–34)
MCHC RBC-ENTMCNC: 27.9 PG — SIGNIFICANT CHANGE UP (ref 27–34)
MCHC RBC-ENTMCNC: 32 G/DL — SIGNIFICANT CHANGE UP (ref 32–36)
MCHC RBC-ENTMCNC: 32.3 G/DL — SIGNIFICANT CHANGE UP (ref 32–36)
MCV RBC AUTO: 86.4 FL — SIGNIFICANT CHANGE UP (ref 80–100)
MCV RBC AUTO: 87 FL — SIGNIFICANT CHANGE UP (ref 80–100)
MONOCYTES # BLD AUTO: 0.48 K/UL — SIGNIFICANT CHANGE UP (ref 0–0.9)
MONOCYTES NFR BLD AUTO: 4.7 % — SIGNIFICANT CHANGE UP (ref 2–14)
MRSA PCR RESULT.: SIGNIFICANT CHANGE UP
NEUTROPHILS # BLD AUTO: 8.22 K/UL — HIGH (ref 1.8–7.4)
NEUTROPHILS NFR BLD AUTO: 80.5 % — HIGH (ref 43–77)
NONHDLC SERPL-MCNC: 70 MG/DL — SIGNIFICANT CHANGE UP
NRBC # BLD AUTO: 0 K/UL — SIGNIFICANT CHANGE UP (ref 0–0)
NRBC # BLD AUTO: 0 K/UL — SIGNIFICANT CHANGE UP (ref 0–0)
NRBC # FLD: 0 K/UL — SIGNIFICANT CHANGE UP (ref 0–0)
NRBC # FLD: 0 K/UL — SIGNIFICANT CHANGE UP (ref 0–0)
NRBC BLD AUTO-RTO: 0 /100 WBCS — SIGNIFICANT CHANGE UP (ref 0–0)
NRBC BLD AUTO-RTO: 0 /100 WBCS — SIGNIFICANT CHANGE UP (ref 0–0)
PHOSPHATE SERPL-MCNC: 2 MG/DL — LOW (ref 2.5–4.5)
PHOSPHATE SERPL-MCNC: 3.7 MG/DL — SIGNIFICANT CHANGE UP (ref 2.5–4.5)
PLATELET # BLD AUTO: 238 K/UL — SIGNIFICANT CHANGE UP (ref 150–400)
PLATELET # BLD AUTO: 247 K/UL — SIGNIFICANT CHANGE UP (ref 150–400)
PMV BLD: 10.2 FL — SIGNIFICANT CHANGE UP (ref 7–13)
PMV BLD: 9.7 FL — SIGNIFICANT CHANGE UP (ref 7–13)
POTASSIUM SERPL-MCNC: 3.4 MMOL/L — LOW (ref 3.5–5.3)
POTASSIUM SERPL-MCNC: 3.6 MMOL/L — SIGNIFICANT CHANGE UP (ref 3.5–5.3)
POTASSIUM SERPL-SCNC: 3.4 MMOL/L — LOW (ref 3.5–5.3)
POTASSIUM SERPL-SCNC: 3.6 MMOL/L — SIGNIFICANT CHANGE UP (ref 3.5–5.3)
PROCALCITONIN SERPL-MCNC: 0.26 NG/ML — HIGH
PROT SERPL-MCNC: 5.7 G/DL — LOW (ref 6–8.3)
PROT SERPL-MCNC: 6.6 G/DL — SIGNIFICANT CHANGE UP (ref 6–8.3)
RAPID RVP RESULT: SIGNIFICANT CHANGE UP
RBC # BLD: 3.76 M/UL — LOW (ref 3.8–5.2)
RBC # BLD: 4.71 M/UL — SIGNIFICANT CHANGE UP (ref 3.8–5.2)
RBC # FLD: 13.8 % — SIGNIFICANT CHANGE UP (ref 10.3–14.5)
RBC # FLD: 14 % — SIGNIFICANT CHANGE UP (ref 10.3–14.5)
S AUREUS DNA NOSE QL NAA+PROBE: SIGNIFICANT CHANGE UP
SARS-COV-2 RNA SPEC QL NAA+PROBE: SIGNIFICANT CHANGE UP
SODIUM SERPL-SCNC: 140 MMOL/L — SIGNIFICANT CHANGE UP (ref 135–145)
SODIUM SERPL-SCNC: 143 MMOL/L — SIGNIFICANT CHANGE UP (ref 135–145)
TRIGL SERPL-MCNC: 56 MG/DL — SIGNIFICANT CHANGE UP
WBC # BLD: 10.21 K/UL — SIGNIFICANT CHANGE UP (ref 3.8–10.5)
WBC # BLD: 12.96 K/UL — HIGH (ref 3.8–10.5)
WBC # FLD AUTO: 10.21 K/UL — SIGNIFICANT CHANGE UP (ref 3.8–10.5)
WBC # FLD AUTO: 12.96 K/UL — HIGH (ref 3.8–10.5)

## 2025-08-09 PROCEDURE — 82962 GLUCOSE BLOOD TEST: CPT

## 2025-08-09 PROCEDURE — 93005 ELECTROCARDIOGRAM TRACING: CPT

## 2025-08-09 PROCEDURE — 74177 CT ABD & PELVIS W/CONTRAST: CPT

## 2025-08-09 PROCEDURE — 83036 HEMOGLOBIN GLYCOSYLATED A1C: CPT

## 2025-08-09 PROCEDURE — 70450 CT HEAD/BRAIN W/O DYE: CPT

## 2025-08-09 PROCEDURE — 71250 CT THORAX DX C-: CPT | Mod: 26

## 2025-08-09 PROCEDURE — 71250 CT THORAX DX C-: CPT

## 2025-08-09 PROCEDURE — 99233 SBSQ HOSP IP/OBS HIGH 50: CPT

## 2025-08-09 PROCEDURE — 87086 URINE CULTURE/COLONY COUNT: CPT

## 2025-08-09 PROCEDURE — 83605 ASSAY OF LACTIC ACID: CPT

## 2025-08-09 PROCEDURE — 99222 1ST HOSP IP/OBS MODERATE 55: CPT

## 2025-08-09 PROCEDURE — 84100 ASSAY OF PHOSPHORUS: CPT

## 2025-08-09 PROCEDURE — 82272 OCCULT BLD FECES 1-3 TESTS: CPT

## 2025-08-09 PROCEDURE — 86900 BLOOD TYPING SEROLOGIC ABO: CPT

## 2025-08-09 PROCEDURE — 97162 PT EVAL MOD COMPLEX 30 MIN: CPT

## 2025-08-09 PROCEDURE — 81001 URINALYSIS AUTO W/SCOPE: CPT

## 2025-08-09 PROCEDURE — 0225U NFCT DS DNA&RNA 21 SARSCOV2: CPT

## 2025-08-09 PROCEDURE — 83735 ASSAY OF MAGNESIUM: CPT

## 2025-08-09 PROCEDURE — 83690 ASSAY OF LIPASE: CPT

## 2025-08-09 PROCEDURE — 86850 RBC ANTIBODY SCREEN: CPT

## 2025-08-09 PROCEDURE — 80061 LIPID PANEL: CPT

## 2025-08-09 PROCEDURE — 85027 COMPLETE CBC AUTOMATED: CPT

## 2025-08-09 PROCEDURE — 86901 BLOOD TYPING SEROLOGIC RH(D): CPT

## 2025-08-09 PROCEDURE — 84145 PROCALCITONIN (PCT): CPT

## 2025-08-09 PROCEDURE — 87641 MR-STAPH DNA AMP PROBE: CPT

## 2025-08-09 PROCEDURE — 71045 X-RAY EXAM CHEST 1 VIEW: CPT

## 2025-08-09 PROCEDURE — 87040 BLOOD CULTURE FOR BACTERIA: CPT

## 2025-08-09 PROCEDURE — 80053 COMPREHEN METABOLIC PANEL: CPT

## 2025-08-09 PROCEDURE — 36415 COLL VENOUS BLD VENIPUNCTURE: CPT

## 2025-08-09 PROCEDURE — 70450 CT HEAD/BRAIN W/O DYE: CPT | Mod: 26

## 2025-08-09 PROCEDURE — G0545: CPT

## 2025-08-09 PROCEDURE — 85025 COMPLETE CBC W/AUTO DIFF WBC: CPT

## 2025-08-09 PROCEDURE — 87640 STAPH A DNA AMP PROBE: CPT

## 2025-08-09 RX ORDER — CEFTRIAXONE 500 MG/1
1000 INJECTION, POWDER, FOR SOLUTION INTRAMUSCULAR; INTRAVENOUS EVERY 24 HOURS
Refills: 0 | Status: COMPLETED | OUTPATIENT
Start: 2025-08-09 | End: 2025-08-11

## 2025-08-09 RX ORDER — BUTYROSPERMUM PARKII(SHEA BUTTER), SIMMONDSIA CHINENSIS (JOJOBA) SEED OIL, ALOE BARBADENSIS LEAF EXTRACT .01; 1; 3.5 G/100G; G/100G; G/100G
250 LIQUID TOPICAL
Refills: 0 | Status: DISCONTINUED | OUTPATIENT
Start: 2025-08-09 | End: 2025-08-16

## 2025-08-09 RX ORDER — CALCIUM CARBONATE 750 MG/1
1 TABLET ORAL EVERY 6 HOURS
Refills: 0 | Status: DISCONTINUED | OUTPATIENT
Start: 2025-08-09 | End: 2025-08-16

## 2025-08-09 RX ORDER — SODIUM CHLORIDE 9 G/1000ML
1000 INJECTION, SOLUTION INTRAVENOUS
Refills: 0 | Status: DISCONTINUED | OUTPATIENT
Start: 2025-08-09 | End: 2025-08-12

## 2025-08-09 RX ORDER — ACETAMINOPHEN 500 MG/5ML
1000 LIQUID (ML) ORAL ONCE
Refills: 0 | Status: COMPLETED | OUTPATIENT
Start: 2025-08-09 | End: 2025-08-09

## 2025-08-09 RX ORDER — ONDANSETRON HCL/PF 4 MG/2 ML
4 VIAL (ML) INJECTION EVERY 6 HOURS
Refills: 0 | Status: DISCONTINUED | OUTPATIENT
Start: 2025-08-09 | End: 2025-08-16

## 2025-08-09 RX ADMIN — TAMSULOSIN HYDROCHLORIDE 0.4 MILLIGRAM(S): 0.4 CAPSULE ORAL at 20:33

## 2025-08-09 RX ADMIN — Medication 100 MILLILITER(S): at 11:29

## 2025-08-09 RX ADMIN — Medication 4 MILLIGRAM(S): at 05:16

## 2025-08-09 RX ADMIN — CALCIUM CARBONATE 1 TABLET(S): 750 TABLET ORAL at 18:49

## 2025-08-09 RX ADMIN — Medication 400 MILLIGRAM(S): at 05:16

## 2025-08-09 RX ADMIN — Medication 650 MILLIGRAM(S): at 18:49

## 2025-08-09 RX ADMIN — Medication 3 MILLIGRAM(S): at 20:33

## 2025-08-09 RX ADMIN — Medication 200 MILLIGRAM(S): at 15:59

## 2025-08-09 RX ADMIN — Medication 1000 MILLIGRAM(S): at 17:54

## 2025-08-09 RX ADMIN — DULOXETINE 30 MILLIGRAM(S): 20 CAPSULE, DELAYED RELEASE ORAL at 11:29

## 2025-08-09 RX ADMIN — Medication 30 MILLILITER(S): at 14:32

## 2025-08-09 RX ADMIN — CLOPIDOGREL BISULFATE 75 MILLIGRAM(S): 75 TABLET, FILM COATED ORAL at 11:30

## 2025-08-09 RX ADMIN — BUTYROSPERMUM PARKII(SHEA BUTTER), SIMMONDSIA CHINENSIS (JOJOBA) SEED OIL, ALOE BARBADENSIS LEAF EXTRACT 250 MILLIGRAM(S): .01; 1; 3.5 LIQUID TOPICAL at 18:49

## 2025-08-09 RX ADMIN — HEPARIN SODIUM 5000 UNIT(S): 1000 INJECTION INTRAVENOUS; SUBCUTANEOUS at 05:16

## 2025-08-09 RX ADMIN — ROSUVASTATIN CALCIUM 20 MILLIGRAM(S): 20 TABLET, FILM COATED ORAL at 20:32

## 2025-08-09 RX ADMIN — HEPARIN SODIUM 5000 UNIT(S): 1000 INJECTION INTRAVENOUS; SUBCUTANEOUS at 18:48

## 2025-08-09 RX ADMIN — Medication 1000 MILLILITER(S): at 12:57

## 2025-08-09 RX ADMIN — Medication 4 MILLIGRAM(S): at 12:57

## 2025-08-09 RX ADMIN — SODIUM CHLORIDE 100 MILLILITER(S): 9 INJECTION, SOLUTION INTRAVENOUS at 14:32

## 2025-08-09 RX ADMIN — Medication 400 MILLIGRAM(S): at 23:35

## 2025-08-09 RX ADMIN — Medication 650 MILLIGRAM(S): at 19:49

## 2025-08-09 RX ADMIN — Medication 1000 MILLIGRAM(S): at 06:32

## 2025-08-09 RX ADMIN — CEFTRIAXONE 100 MILLIGRAM(S): 500 INJECTION, POWDER, FOR SOLUTION INTRAMUSCULAR; INTRAVENOUS at 22:33

## 2025-08-09 RX ADMIN — Medication 50 MILLIGRAM(S): at 20:33

## 2025-08-09 RX ADMIN — Medication 400 MILLIGRAM(S): at 16:54

## 2025-08-10 LAB
ANION GAP SERPL CALC-SCNC: 6 MMOL/L — SIGNIFICANT CHANGE UP (ref 5–17)
BUN SERPL-MCNC: 9 MG/DL — SIGNIFICANT CHANGE UP (ref 7–23)
CALCIUM SERPL-MCNC: 8.5 MG/DL — SIGNIFICANT CHANGE UP (ref 8.5–10.1)
CHLORIDE SERPL-SCNC: 115 MMOL/L — HIGH (ref 96–108)
CO2 SERPL-SCNC: 22 MMOL/L — SIGNIFICANT CHANGE UP (ref 22–31)
CREAT SERPL-MCNC: 0.53 MG/DL — SIGNIFICANT CHANGE UP (ref 0.5–1.3)
EGFR: 90 ML/MIN/1.73M2 — SIGNIFICANT CHANGE UP
EGFR: 90 ML/MIN/1.73M2 — SIGNIFICANT CHANGE UP
GLUCOSE SERPL-MCNC: 98 MG/DL — SIGNIFICANT CHANGE UP (ref 70–99)
HCT VFR BLD CALC: 36.5 % — SIGNIFICANT CHANGE UP (ref 34.5–45)
HGB BLD-MCNC: 11.3 G/DL — LOW (ref 11.5–15.5)
MCHC RBC-ENTMCNC: 27.6 PG — SIGNIFICANT CHANGE UP (ref 27–34)
MCHC RBC-ENTMCNC: 31 G/DL — LOW (ref 32–36)
MCV RBC AUTO: 89.2 FL — SIGNIFICANT CHANGE UP (ref 80–100)
NRBC # BLD AUTO: 0 K/UL — SIGNIFICANT CHANGE UP (ref 0–0)
NRBC # FLD: 0 K/UL — SIGNIFICANT CHANGE UP (ref 0–0)
NRBC BLD AUTO-RTO: 0 /100 WBCS — SIGNIFICANT CHANGE UP (ref 0–0)
PLATELET # BLD AUTO: 235 K/UL — SIGNIFICANT CHANGE UP (ref 150–400)
PMV BLD: 10.1 FL — SIGNIFICANT CHANGE UP (ref 7–13)
POTASSIUM SERPL-MCNC: 3.1 MMOL/L — LOW (ref 3.5–5.3)
POTASSIUM SERPL-SCNC: 3.1 MMOL/L — LOW (ref 3.5–5.3)
RBC # BLD: 4.09 M/UL — SIGNIFICANT CHANGE UP (ref 3.8–5.2)
RBC # FLD: 14 % — SIGNIFICANT CHANGE UP (ref 10.3–14.5)
SODIUM SERPL-SCNC: 143 MMOL/L — SIGNIFICANT CHANGE UP (ref 135–145)
WBC # BLD: 9.14 K/UL — SIGNIFICANT CHANGE UP (ref 3.8–10.5)
WBC # FLD AUTO: 9.14 K/UL — SIGNIFICANT CHANGE UP (ref 3.8–10.5)

## 2025-08-10 PROCEDURE — 99233 SBSQ HOSP IP/OBS HIGH 50: CPT

## 2025-08-10 PROCEDURE — 70551 MRI BRAIN STEM W/O DYE: CPT | Mod: 26

## 2025-08-10 PROCEDURE — G0545: CPT

## 2025-08-10 PROCEDURE — 99231 SBSQ HOSP IP/OBS SF/LOW 25: CPT

## 2025-08-10 RX ORDER — SOD PHOS DI, MONO/K PHOS MONO 250 MG
1 TABLET ORAL
Refills: 0 | Status: DISCONTINUED | OUTPATIENT
Start: 2025-08-10 | End: 2025-08-11

## 2025-08-10 RX ORDER — DIAZEPAM 5 MG/1
2.5 TABLET ORAL ONCE
Refills: 0 | Status: DISCONTINUED | OUTPATIENT
Start: 2025-08-10 | End: 2025-08-10

## 2025-08-10 RX ORDER — DIAZEPAM 5 MG/1
5 TABLET ORAL ONCE
Refills: 0 | Status: DISCONTINUED | OUTPATIENT
Start: 2025-08-10 | End: 2025-08-10

## 2025-08-10 RX ADMIN — TAMSULOSIN HYDROCHLORIDE 0.4 MILLIGRAM(S): 0.4 CAPSULE ORAL at 21:36

## 2025-08-10 RX ADMIN — BUTYROSPERMUM PARKII(SHEA BUTTER), SIMMONDSIA CHINENSIS (JOJOBA) SEED OIL, ALOE BARBADENSIS LEAF EXTRACT 250 MILLIGRAM(S): .01; 1; 3.5 LIQUID TOPICAL at 18:11

## 2025-08-10 RX ADMIN — HEPARIN SODIUM 5000 UNIT(S): 1000 INJECTION INTRAVENOUS; SUBCUTANEOUS at 18:11

## 2025-08-10 RX ADMIN — Medication 100 MILLIEQUIVALENT(S): at 18:10

## 2025-08-10 RX ADMIN — HEPARIN SODIUM 5000 UNIT(S): 1000 INJECTION INTRAVENOUS; SUBCUTANEOUS at 05:35

## 2025-08-10 RX ADMIN — BUTYROSPERMUM PARKII(SHEA BUTTER), SIMMONDSIA CHINENSIS (JOJOBA) SEED OIL, ALOE BARBADENSIS LEAF EXTRACT 250 MILLIGRAM(S): .01; 1; 3.5 LIQUID TOPICAL at 05:35

## 2025-08-10 RX ADMIN — METOPROLOL SUCCINATE 25 MILLIGRAM(S): 50 TABLET, EXTENDED RELEASE ORAL at 05:35

## 2025-08-10 RX ADMIN — DIAZEPAM 5 MILLIGRAM(S): 5 TABLET ORAL at 12:29

## 2025-08-10 RX ADMIN — Medication 50 MILLIGRAM(S): at 21:35

## 2025-08-10 RX ADMIN — ROSUVASTATIN CALCIUM 20 MILLIGRAM(S): 20 TABLET, FILM COATED ORAL at 21:36

## 2025-08-10 RX ADMIN — Medication 1000 MILLIGRAM(S): at 00:35

## 2025-08-10 RX ADMIN — Medication 100 MILLIEQUIVALENT(S): at 09:57

## 2025-08-10 RX ADMIN — CLOPIDOGREL BISULFATE 75 MILLIGRAM(S): 75 TABLET, FILM COATED ORAL at 12:29

## 2025-08-10 RX ADMIN — Medication 75 MICROGRAM(S): at 05:35

## 2025-08-10 RX ADMIN — Medication 100 MILLIEQUIVALENT(S): at 14:36

## 2025-08-10 RX ADMIN — SODIUM CHLORIDE 100 MILLILITER(S): 9 INJECTION, SOLUTION INTRAVENOUS at 09:57

## 2025-08-10 RX ADMIN — Medication 40 MILLIGRAM(S): at 05:35

## 2025-08-10 RX ADMIN — CEFTRIAXONE 100 MILLIGRAM(S): 500 INJECTION, POWDER, FOR SOLUTION INTRAMUSCULAR; INTRAVENOUS at 21:15

## 2025-08-10 RX ADMIN — Medication 1 PACKET(S): at 18:10

## 2025-08-11 LAB
ALBUMIN SERPL ELPH-MCNC: 3 G/DL — LOW (ref 3.3–5)
ALP SERPL-CCNC: 59 U/L — SIGNIFICANT CHANGE UP (ref 40–120)
ALT FLD-CCNC: 16 U/L — SIGNIFICANT CHANGE UP (ref 12–78)
ANION GAP SERPL CALC-SCNC: 5 MMOL/L — SIGNIFICANT CHANGE UP (ref 5–17)
AST SERPL-CCNC: 14 U/L — LOW (ref 15–37)
BILIRUB SERPL-MCNC: 0.4 MG/DL — SIGNIFICANT CHANGE UP (ref 0.2–1.2)
BUN SERPL-MCNC: 6 MG/DL — LOW (ref 7–23)
CALCIUM SERPL-MCNC: 8.1 MG/DL — LOW (ref 8.5–10.1)
CHLORIDE SERPL-SCNC: 104 MMOL/L — SIGNIFICANT CHANGE UP (ref 96–108)
CO2 SERPL-SCNC: 26 MMOL/L — SIGNIFICANT CHANGE UP (ref 22–31)
CREAT SERPL-MCNC: 0.51 MG/DL — SIGNIFICANT CHANGE UP (ref 0.5–1.3)
EGFR: 91 ML/MIN/1.73M2 — SIGNIFICANT CHANGE UP
EGFR: 91 ML/MIN/1.73M2 — SIGNIFICANT CHANGE UP
GLUCOSE SERPL-MCNC: 136 MG/DL — HIGH (ref 70–99)
HCT VFR BLD CALC: 35.1 % — SIGNIFICANT CHANGE UP (ref 34.5–45)
HGB BLD-MCNC: 11.5 G/DL — SIGNIFICANT CHANGE UP (ref 11.5–15.5)
MAGNESIUM SERPL-MCNC: 1.9 MG/DL — SIGNIFICANT CHANGE UP (ref 1.6–2.6)
MCHC RBC-ENTMCNC: 28.2 PG — SIGNIFICANT CHANGE UP (ref 27–34)
MCHC RBC-ENTMCNC: 32.8 G/DL — SIGNIFICANT CHANGE UP (ref 32–36)
MCV RBC AUTO: 86 FL — SIGNIFICANT CHANGE UP (ref 80–100)
NRBC # BLD AUTO: 0 K/UL — SIGNIFICANT CHANGE UP (ref 0–0)
NRBC # FLD: 0 K/UL — SIGNIFICANT CHANGE UP (ref 0–0)
NRBC BLD AUTO-RTO: 0 /100 WBCS — SIGNIFICANT CHANGE UP (ref 0–0)
PHOSPHATE SERPL-MCNC: 1.7 MG/DL — LOW (ref 2.5–4.5)
PLATELET # BLD AUTO: 235 K/UL — SIGNIFICANT CHANGE UP (ref 150–400)
PMV BLD: 10.3 FL — SIGNIFICANT CHANGE UP (ref 7–13)
POTASSIUM SERPL-MCNC: 2.8 MMOL/L — CRITICAL LOW (ref 3.5–5.3)
POTASSIUM SERPL-MCNC: 4.7 MMOL/L — SIGNIFICANT CHANGE UP (ref 3.5–5.3)
POTASSIUM SERPL-SCNC: 2.8 MMOL/L — CRITICAL LOW (ref 3.5–5.3)
POTASSIUM SERPL-SCNC: 4.7 MMOL/L — SIGNIFICANT CHANGE UP (ref 3.5–5.3)
PROT SERPL-MCNC: 6.2 G/DL — SIGNIFICANT CHANGE UP (ref 6–8.3)
RBC # BLD: 4.08 M/UL — SIGNIFICANT CHANGE UP (ref 3.8–5.2)
RBC # FLD: 13.2 % — SIGNIFICANT CHANGE UP (ref 10.3–14.5)
SODIUM SERPL-SCNC: 135 MMOL/L — SIGNIFICANT CHANGE UP (ref 135–145)
WBC # BLD: 8.92 K/UL — SIGNIFICANT CHANGE UP (ref 3.8–10.5)
WBC # FLD AUTO: 8.92 K/UL — SIGNIFICANT CHANGE UP (ref 3.8–10.5)

## 2025-08-11 PROCEDURE — 81001 URINALYSIS AUTO W/SCOPE: CPT

## 2025-08-11 PROCEDURE — 70450 CT HEAD/BRAIN W/O DYE: CPT

## 2025-08-11 PROCEDURE — 83735 ASSAY OF MAGNESIUM: CPT

## 2025-08-11 PROCEDURE — 71045 X-RAY EXAM CHEST 1 VIEW: CPT

## 2025-08-11 PROCEDURE — 86850 RBC ANTIBODY SCREEN: CPT

## 2025-08-11 PROCEDURE — 87086 URINE CULTURE/COLONY COUNT: CPT

## 2025-08-11 PROCEDURE — 86901 BLOOD TYPING SEROLOGIC RH(D): CPT

## 2025-08-11 PROCEDURE — 82272 OCCULT BLD FECES 1-3 TESTS: CPT

## 2025-08-11 PROCEDURE — 80053 COMPREHEN METABOLIC PANEL: CPT

## 2025-08-11 PROCEDURE — 87040 BLOOD CULTURE FOR BACTERIA: CPT

## 2025-08-11 PROCEDURE — 80048 BASIC METABOLIC PNL TOTAL CA: CPT

## 2025-08-11 PROCEDURE — 36415 COLL VENOUS BLD VENIPUNCTURE: CPT

## 2025-08-11 PROCEDURE — 84145 PROCALCITONIN (PCT): CPT

## 2025-08-11 PROCEDURE — 80061 LIPID PANEL: CPT

## 2025-08-11 PROCEDURE — 87640 STAPH A DNA AMP PROBE: CPT

## 2025-08-11 PROCEDURE — 83036 HEMOGLOBIN GLYCOSYLATED A1C: CPT

## 2025-08-11 PROCEDURE — 84100 ASSAY OF PHOSPHORUS: CPT

## 2025-08-11 PROCEDURE — 85025 COMPLETE CBC W/AUTO DIFF WBC: CPT

## 2025-08-11 PROCEDURE — 83605 ASSAY OF LACTIC ACID: CPT

## 2025-08-11 PROCEDURE — 93005 ELECTROCARDIOGRAM TRACING: CPT

## 2025-08-11 PROCEDURE — 99239 HOSP IP/OBS DSCHRG MGMT >30: CPT

## 2025-08-11 PROCEDURE — 83690 ASSAY OF LIPASE: CPT

## 2025-08-11 PROCEDURE — 85027 COMPLETE CBC AUTOMATED: CPT

## 2025-08-11 PROCEDURE — 86900 BLOOD TYPING SEROLOGIC ABO: CPT

## 2025-08-11 PROCEDURE — 70551 MRI BRAIN STEM W/O DYE: CPT

## 2025-08-11 PROCEDURE — 0225U NFCT DS DNA&RNA 21 SARSCOV2: CPT

## 2025-08-11 PROCEDURE — G0545: CPT

## 2025-08-11 PROCEDURE — 97162 PT EVAL MOD COMPLEX 30 MIN: CPT

## 2025-08-11 PROCEDURE — 84132 ASSAY OF SERUM POTASSIUM: CPT

## 2025-08-11 PROCEDURE — 74177 CT ABD & PELVIS W/CONTRAST: CPT

## 2025-08-11 PROCEDURE — 82962 GLUCOSE BLOOD TEST: CPT

## 2025-08-11 PROCEDURE — 87641 MR-STAPH DNA AMP PROBE: CPT

## 2025-08-11 PROCEDURE — 71250 CT THORAX DX C-: CPT

## 2025-08-11 PROCEDURE — 99232 SBSQ HOSP IP/OBS MODERATE 35: CPT

## 2025-08-11 RX ORDER — SOD PHOS DI, MONO/K PHOS MONO 250 MG
1 TABLET ORAL
Refills: 0 | Status: COMPLETED | OUTPATIENT
Start: 2025-08-11 | End: 2025-08-13

## 2025-08-11 RX ORDER — LANOLIN/MINERAL OIL/PETROLATUM
1 OINTMENT (GRAM) OPHTHALMIC (EYE) EVERY 6 HOURS
Refills: 0 | Status: DISCONTINUED | OUTPATIENT
Start: 2025-08-11 | End: 2025-08-16

## 2025-08-11 RX ADMIN — HEPARIN SODIUM 5000 UNIT(S): 1000 INJECTION INTRAVENOUS; SUBCUTANEOUS at 05:33

## 2025-08-11 RX ADMIN — Medication 1 DROP(S): at 17:28

## 2025-08-11 RX ADMIN — CEFTRIAXONE 100 MILLIGRAM(S): 500 INJECTION, POWDER, FOR SOLUTION INTRAMUSCULAR; INTRAVENOUS at 21:36

## 2025-08-11 RX ADMIN — Medication 1 PACKET(S): at 17:27

## 2025-08-11 RX ADMIN — BUTYROSPERMUM PARKII(SHEA BUTTER), SIMMONDSIA CHINENSIS (JOJOBA) SEED OIL, ALOE BARBADENSIS LEAF EXTRACT 250 MILLIGRAM(S): .01; 1; 3.5 LIQUID TOPICAL at 05:33

## 2025-08-11 RX ADMIN — Medication 40 MILLIEQUIVALENT(S): at 09:43

## 2025-08-11 RX ADMIN — Medication 1 PACKET(S): at 07:17

## 2025-08-11 RX ADMIN — SODIUM CHLORIDE 100 MILLILITER(S): 9 INJECTION, SOLUTION INTRAVENOUS at 05:33

## 2025-08-11 RX ADMIN — HEPARIN SODIUM 5000 UNIT(S): 1000 INJECTION INTRAVENOUS; SUBCUTANEOUS at 17:28

## 2025-08-11 RX ADMIN — CLOPIDOGREL BISULFATE 75 MILLIGRAM(S): 75 TABLET, FILM COATED ORAL at 13:46

## 2025-08-11 RX ADMIN — TAMSULOSIN HYDROCHLORIDE 0.4 MILLIGRAM(S): 0.4 CAPSULE ORAL at 21:37

## 2025-08-11 RX ADMIN — Medication 50 MILLIGRAM(S): at 21:37

## 2025-08-11 RX ADMIN — Medication 40 MILLIGRAM(S): at 07:21

## 2025-08-11 RX ADMIN — METOPROLOL SUCCINATE 25 MILLIGRAM(S): 50 TABLET, EXTENDED RELEASE ORAL at 05:34

## 2025-08-11 RX ADMIN — BUTYROSPERMUM PARKII(SHEA BUTTER), SIMMONDSIA CHINENSIS (JOJOBA) SEED OIL, ALOE BARBADENSIS LEAF EXTRACT 250 MILLIGRAM(S): .01; 1; 3.5 LIQUID TOPICAL at 17:27

## 2025-08-11 RX ADMIN — Medication 75 MICROGRAM(S): at 05:34

## 2025-08-11 RX ADMIN — Medication 40 MILLIEQUIVALENT(S): at 13:46

## 2025-08-11 RX ADMIN — ROSUVASTATIN CALCIUM 20 MILLIGRAM(S): 20 TABLET, FILM COATED ORAL at 21:37

## 2025-08-12 DIAGNOSIS — Z29.9 ENCOUNTER FOR PROPHYLACTIC MEASURES, UNSPECIFIED: ICD-10-CM

## 2025-08-12 LAB
-  AMOXICILLIN/CLAVULANIC ACID: SIGNIFICANT CHANGE UP
-  AMPICILLIN/SULBACTAM: SIGNIFICANT CHANGE UP
-  AMPICILLIN: SIGNIFICANT CHANGE UP
-  AZTREONAM: SIGNIFICANT CHANGE UP
-  CEFAZOLIN: SIGNIFICANT CHANGE UP
-  CEFEPIME: SIGNIFICANT CHANGE UP
-  CEFOXITIN: SIGNIFICANT CHANGE UP
-  CEFTRIAXONE: SIGNIFICANT CHANGE UP
-  CEFUROXIME: SIGNIFICANT CHANGE UP
-  CIPROFLOXACIN: SIGNIFICANT CHANGE UP
-  ERTAPENEM: SIGNIFICANT CHANGE UP
-  GENTAMICIN: SIGNIFICANT CHANGE UP
-  IMIPENEM: SIGNIFICANT CHANGE UP
-  LEVOFLOXACIN: SIGNIFICANT CHANGE UP
-  MEROPENEM: SIGNIFICANT CHANGE UP
-  NITROFURANTOIN: SIGNIFICANT CHANGE UP
-  PIPERACILLIN/TAZOBACTAM: SIGNIFICANT CHANGE UP
-  TIGECYCLINE: SIGNIFICANT CHANGE UP
-  TOBRAMYCIN: SIGNIFICANT CHANGE UP
-  TRIMETHOPRIM/SULFAMETHOXAZOLE: SIGNIFICANT CHANGE UP
ANION GAP SERPL CALC-SCNC: 8 MMOL/L — SIGNIFICANT CHANGE UP (ref 5–17)
BUN SERPL-MCNC: 5 MG/DL — LOW (ref 7–23)
CALCIUM SERPL-MCNC: 8.2 MG/DL — LOW (ref 8.5–10.1)
CHLORIDE SERPL-SCNC: 110 MMOL/L — HIGH (ref 96–108)
CO2 SERPL-SCNC: 22 MMOL/L — SIGNIFICANT CHANGE UP (ref 22–31)
CREAT SERPL-MCNC: 0.44 MG/DL — LOW (ref 0.5–1.3)
CULTURE RESULTS: ABNORMAL
EGFR: 94 ML/MIN/1.73M2 — SIGNIFICANT CHANGE UP
EGFR: 94 ML/MIN/1.73M2 — SIGNIFICANT CHANGE UP
GLUCOSE SERPL-MCNC: 146 MG/DL — HIGH (ref 70–99)
HCT VFR BLD CALC: 34.8 % — SIGNIFICANT CHANGE UP (ref 34.5–45)
HGB BLD-MCNC: 10.9 G/DL — LOW (ref 11.5–15.5)
MCHC RBC-ENTMCNC: 27.3 PG — SIGNIFICANT CHANGE UP (ref 27–34)
MCHC RBC-ENTMCNC: 31.3 G/DL — LOW (ref 32–36)
MCV RBC AUTO: 87.2 FL — SIGNIFICANT CHANGE UP (ref 80–100)
METHOD TYPE: SIGNIFICANT CHANGE UP
NRBC # BLD AUTO: 0 K/UL — SIGNIFICANT CHANGE UP (ref 0–0)
NRBC # FLD: 0 K/UL — SIGNIFICANT CHANGE UP (ref 0–0)
NRBC BLD AUTO-RTO: 0 /100 WBCS — SIGNIFICANT CHANGE UP (ref 0–0)
ORGANISM # SPEC MICROSCOPIC CNT: ABNORMAL
ORGANISM # SPEC MICROSCOPIC CNT: SIGNIFICANT CHANGE UP
PHOSPHATE SERPL-MCNC: 2.2 MG/DL — LOW (ref 2.5–4.5)
PLATELET # BLD AUTO: 233 K/UL — SIGNIFICANT CHANGE UP (ref 150–400)
PMV BLD: 10.1 FL — SIGNIFICANT CHANGE UP (ref 7–13)
POTASSIUM SERPL-MCNC: 3 MMOL/L — LOW (ref 3.5–5.3)
POTASSIUM SERPL-SCNC: 3 MMOL/L — LOW (ref 3.5–5.3)
RBC # BLD: 3.99 M/UL — SIGNIFICANT CHANGE UP (ref 3.8–5.2)
RBC # FLD: 13.2 % — SIGNIFICANT CHANGE UP (ref 10.3–14.5)
SODIUM SERPL-SCNC: 140 MMOL/L — SIGNIFICANT CHANGE UP (ref 135–145)
SPECIMEN SOURCE: SIGNIFICANT CHANGE UP
WBC # BLD: 6.56 K/UL — SIGNIFICANT CHANGE UP (ref 3.8–10.5)
WBC # FLD AUTO: 6.56 K/UL — SIGNIFICANT CHANGE UP (ref 3.8–10.5)

## 2025-08-12 PROCEDURE — 0225U NFCT DS DNA&RNA 21 SARSCOV2: CPT

## 2025-08-12 PROCEDURE — 71250 CT THORAX DX C-: CPT

## 2025-08-12 PROCEDURE — 97162 PT EVAL MOD COMPLEX 30 MIN: CPT

## 2025-08-12 PROCEDURE — 86901 BLOOD TYPING SEROLOGIC RH(D): CPT

## 2025-08-12 PROCEDURE — 82962 GLUCOSE BLOOD TEST: CPT

## 2025-08-12 PROCEDURE — 97116 GAIT TRAINING THERAPY: CPT

## 2025-08-12 PROCEDURE — 80061 LIPID PANEL: CPT

## 2025-08-12 PROCEDURE — 82272 OCCULT BLD FECES 1-3 TESTS: CPT

## 2025-08-12 PROCEDURE — 93005 ELECTROCARDIOGRAM TRACING: CPT

## 2025-08-12 PROCEDURE — 84145 PROCALCITONIN (PCT): CPT

## 2025-08-12 PROCEDURE — G0545: CPT

## 2025-08-12 PROCEDURE — 36415 COLL VENOUS BLD VENIPUNCTURE: CPT

## 2025-08-12 PROCEDURE — 86900 BLOOD TYPING SEROLOGIC ABO: CPT

## 2025-08-12 PROCEDURE — 83735 ASSAY OF MAGNESIUM: CPT

## 2025-08-12 PROCEDURE — 70551 MRI BRAIN STEM W/O DYE: CPT

## 2025-08-12 PROCEDURE — 81001 URINALYSIS AUTO W/SCOPE: CPT

## 2025-08-12 PROCEDURE — 99232 SBSQ HOSP IP/OBS MODERATE 35: CPT

## 2025-08-12 PROCEDURE — 80048 BASIC METABOLIC PNL TOTAL CA: CPT

## 2025-08-12 PROCEDURE — 86850 RBC ANTIBODY SCREEN: CPT

## 2025-08-12 PROCEDURE — 84132 ASSAY OF SERUM POTASSIUM: CPT

## 2025-08-12 PROCEDURE — 87040 BLOOD CULTURE FOR BACTERIA: CPT

## 2025-08-12 PROCEDURE — 97530 THERAPEUTIC ACTIVITIES: CPT

## 2025-08-12 PROCEDURE — 83036 HEMOGLOBIN GLYCOSYLATED A1C: CPT

## 2025-08-12 PROCEDURE — 87641 MR-STAPH DNA AMP PROBE: CPT

## 2025-08-12 PROCEDURE — 87086 URINE CULTURE/COLONY COUNT: CPT

## 2025-08-12 PROCEDURE — 71045 X-RAY EXAM CHEST 1 VIEW: CPT

## 2025-08-12 PROCEDURE — 74177 CT ABD & PELVIS W/CONTRAST: CPT

## 2025-08-12 PROCEDURE — 84100 ASSAY OF PHOSPHORUS: CPT

## 2025-08-12 PROCEDURE — 87640 STAPH A DNA AMP PROBE: CPT

## 2025-08-12 PROCEDURE — 83690 ASSAY OF LIPASE: CPT

## 2025-08-12 PROCEDURE — 85025 COMPLETE CBC W/AUTO DIFF WBC: CPT

## 2025-08-12 PROCEDURE — 85027 COMPLETE CBC AUTOMATED: CPT

## 2025-08-12 PROCEDURE — 83605 ASSAY OF LACTIC ACID: CPT

## 2025-08-12 PROCEDURE — 87186 SC STD MICRODIL/AGAR DIL: CPT

## 2025-08-12 PROCEDURE — 70450 CT HEAD/BRAIN W/O DYE: CPT

## 2025-08-12 PROCEDURE — 80053 COMPREHEN METABOLIC PANEL: CPT

## 2025-08-12 RX ORDER — SODIUM CHLORIDE 9 G/1000ML
1000 INJECTION, SOLUTION INTRAVENOUS
Refills: 0 | Status: DISCONTINUED | OUTPATIENT
Start: 2025-08-12 | End: 2025-08-13

## 2025-08-12 RX ORDER — CEFPODOXIME PROXETIL 200 MG/1
200 TABLET, FILM COATED ORAL EVERY 12 HOURS
Refills: 0 | Status: COMPLETED | OUTPATIENT
Start: 2025-08-13 | End: 2025-08-13

## 2025-08-12 RX ORDER — CEFTRIAXONE 500 MG/1
1000 INJECTION, POWDER, FOR SOLUTION INTRAMUSCULAR; INTRAVENOUS EVERY 24 HOURS
Refills: 0 | Status: COMPLETED | OUTPATIENT
Start: 2025-08-12 | End: 2025-08-12

## 2025-08-12 RX ORDER — WHITE PETROLATUM 1 G/G
1 OINTMENT TOPICAL ONCE
Refills: 0 | Status: COMPLETED | OUTPATIENT
Start: 2025-08-12 | End: 2025-08-12

## 2025-08-12 RX ADMIN — HEPARIN SODIUM 5000 UNIT(S): 1000 INJECTION INTRAVENOUS; SUBCUTANEOUS at 06:35

## 2025-08-12 RX ADMIN — METOPROLOL SUCCINATE 25 MILLIGRAM(S): 50 TABLET, EXTENDED RELEASE ORAL at 06:34

## 2025-08-12 RX ADMIN — BUTYROSPERMUM PARKII(SHEA BUTTER), SIMMONDSIA CHINENSIS (JOJOBA) SEED OIL, ALOE BARBADENSIS LEAF EXTRACT 250 MILLIGRAM(S): .01; 1; 3.5 LIQUID TOPICAL at 06:34

## 2025-08-12 RX ADMIN — Medication 650 MILLIGRAM(S): at 12:57

## 2025-08-12 RX ADMIN — Medication 1 DROP(S): at 18:49

## 2025-08-12 RX ADMIN — CLOPIDOGREL BISULFATE 75 MILLIGRAM(S): 75 TABLET, FILM COATED ORAL at 12:51

## 2025-08-12 RX ADMIN — Medication 50 MILLIGRAM(S): at 21:50

## 2025-08-12 RX ADMIN — ROSUVASTATIN CALCIUM 20 MILLIGRAM(S): 20 TABLET, FILM COATED ORAL at 21:50

## 2025-08-12 RX ADMIN — Medication 1 DROP(S): at 06:36

## 2025-08-12 RX ADMIN — Medication 40 MILLIEQUIVALENT(S): at 21:51

## 2025-08-12 RX ADMIN — Medication 650 MILLIGRAM(S): at 13:27

## 2025-08-12 RX ADMIN — Medication 40 MILLIEQUIVALENT(S): at 18:49

## 2025-08-12 RX ADMIN — TAMSULOSIN HYDROCHLORIDE 0.4 MILLIGRAM(S): 0.4 CAPSULE ORAL at 21:51

## 2025-08-12 RX ADMIN — CEFTRIAXONE 100 MILLIGRAM(S): 500 INJECTION, POWDER, FOR SOLUTION INTRAMUSCULAR; INTRAVENOUS at 21:51

## 2025-08-12 RX ADMIN — Medication 40 MILLIGRAM(S): at 06:35

## 2025-08-12 RX ADMIN — HEPARIN SODIUM 5000 UNIT(S): 1000 INJECTION INTRAVENOUS; SUBCUTANEOUS at 18:48

## 2025-08-12 RX ADMIN — Medication 1 DROP(S): at 12:51

## 2025-08-12 RX ADMIN — WHITE PETROLATUM 1 APPLICATION(S): 1 OINTMENT TOPICAL at 21:50

## 2025-08-12 RX ADMIN — Medication 75 MICROGRAM(S): at 06:34

## 2025-08-12 RX ADMIN — BUTYROSPERMUM PARKII(SHEA BUTTER), SIMMONDSIA CHINENSIS (JOJOBA) SEED OIL, ALOE BARBADENSIS LEAF EXTRACT 250 MILLIGRAM(S): .01; 1; 3.5 LIQUID TOPICAL at 18:50

## 2025-08-12 RX ADMIN — Medication 1 PACKET(S): at 12:51

## 2025-08-12 RX ADMIN — Medication 1 DROP(S): at 00:41

## 2025-08-12 RX ADMIN — Medication 1 PACKET(S): at 18:49

## 2025-08-12 RX ADMIN — Medication 1 PACKET(S): at 06:35

## 2025-08-13 PROCEDURE — 99232 SBSQ HOSP IP/OBS MODERATE 35: CPT

## 2025-08-13 RX ADMIN — Medication 1 PACKET(S): at 06:54

## 2025-08-13 RX ADMIN — Medication 1 PACKET(S): at 12:30

## 2025-08-13 RX ADMIN — BUTYROSPERMUM PARKII(SHEA BUTTER), SIMMONDSIA CHINENSIS (JOJOBA) SEED OIL, ALOE BARBADENSIS LEAF EXTRACT 250 MILLIGRAM(S): .01; 1; 3.5 LIQUID TOPICAL at 17:39

## 2025-08-13 RX ADMIN — Medication 1 DROP(S): at 00:21

## 2025-08-13 RX ADMIN — Medication 75 MICROGRAM(S): at 06:54

## 2025-08-13 RX ADMIN — BUTYROSPERMUM PARKII(SHEA BUTTER), SIMMONDSIA CHINENSIS (JOJOBA) SEED OIL, ALOE BARBADENSIS LEAF EXTRACT 250 MILLIGRAM(S): .01; 1; 3.5 LIQUID TOPICAL at 06:54

## 2025-08-13 RX ADMIN — CLOPIDOGREL BISULFATE 75 MILLIGRAM(S): 75 TABLET, FILM COATED ORAL at 12:30

## 2025-08-13 RX ADMIN — ROSUVASTATIN CALCIUM 20 MILLIGRAM(S): 20 TABLET, FILM COATED ORAL at 21:28

## 2025-08-13 RX ADMIN — Medication 650 MILLIGRAM(S): at 17:39

## 2025-08-13 RX ADMIN — HEPARIN SODIUM 5000 UNIT(S): 1000 INJECTION INTRAVENOUS; SUBCUTANEOUS at 17:39

## 2025-08-13 RX ADMIN — HEPARIN SODIUM 5000 UNIT(S): 1000 INJECTION INTRAVENOUS; SUBCUTANEOUS at 06:53

## 2025-08-13 RX ADMIN — TAMSULOSIN HYDROCHLORIDE 0.4 MILLIGRAM(S): 0.4 CAPSULE ORAL at 21:28

## 2025-08-13 RX ADMIN — Medication 50 MILLIGRAM(S): at 21:28

## 2025-08-13 RX ADMIN — Medication 1 DROP(S): at 21:28

## 2025-08-13 RX ADMIN — METOPROLOL SUCCINATE 25 MILLIGRAM(S): 50 TABLET, EXTENDED RELEASE ORAL at 06:54

## 2025-08-13 RX ADMIN — SODIUM CHLORIDE 75 MILLILITER(S): 9 INJECTION, SOLUTION INTRAVENOUS at 21:28

## 2025-08-13 RX ADMIN — CEFPODOXIME PROXETIL 200 MILLIGRAM(S): 200 TABLET, FILM COATED ORAL at 19:46

## 2025-08-13 RX ADMIN — Medication 1 DROP(S): at 12:31

## 2025-08-13 RX ADMIN — Medication 40 MILLIGRAM(S): at 06:54

## 2025-08-13 RX ADMIN — Medication 1 DROP(S): at 07:01

## 2025-08-13 RX ADMIN — CEFPODOXIME PROXETIL 200 MILLIGRAM(S): 200 TABLET, FILM COATED ORAL at 06:54

## 2025-08-13 RX ADMIN — Medication 1 DROP(S): at 18:07

## 2025-08-14 LAB
ANION GAP SERPL CALC-SCNC: 6 MMOL/L — SIGNIFICANT CHANGE UP (ref 5–17)
BUN SERPL-MCNC: 5 MG/DL — LOW (ref 7–23)
CALCIUM SERPL-MCNC: 9.4 MG/DL — SIGNIFICANT CHANGE UP (ref 8.5–10.1)
CHLORIDE SERPL-SCNC: 108 MMOL/L — SIGNIFICANT CHANGE UP (ref 96–108)
CO2 SERPL-SCNC: 25 MMOL/L — SIGNIFICANT CHANGE UP (ref 22–31)
CREAT SERPL-MCNC: 0.54 MG/DL — SIGNIFICANT CHANGE UP (ref 0.5–1.3)
CULTURE RESULTS: SIGNIFICANT CHANGE UP
CULTURE RESULTS: SIGNIFICANT CHANGE UP
EGFR: 90 ML/MIN/1.73M2 — SIGNIFICANT CHANGE UP
EGFR: 90 ML/MIN/1.73M2 — SIGNIFICANT CHANGE UP
GLUCOSE SERPL-MCNC: 113 MG/DL — HIGH (ref 70–99)
HCT VFR BLD CALC: 38 % — SIGNIFICANT CHANGE UP (ref 34.5–45)
HGB BLD-MCNC: 12.2 G/DL — SIGNIFICANT CHANGE UP (ref 11.5–15.5)
MAGNESIUM SERPL-MCNC: 2.2 MG/DL — SIGNIFICANT CHANGE UP (ref 1.6–2.6)
MCHC RBC-ENTMCNC: 27.3 PG — SIGNIFICANT CHANGE UP (ref 27–34)
MCHC RBC-ENTMCNC: 32.1 G/DL — SIGNIFICANT CHANGE UP (ref 32–36)
MCV RBC AUTO: 85 FL — SIGNIFICANT CHANGE UP (ref 80–100)
NRBC # BLD AUTO: 0 K/UL — SIGNIFICANT CHANGE UP (ref 0–0)
NRBC # FLD: 0 K/UL — SIGNIFICANT CHANGE UP (ref 0–0)
NRBC BLD AUTO-RTO: 0 /100 WBCS — SIGNIFICANT CHANGE UP (ref 0–0)
PHOSPHATE SERPL-MCNC: 4 MG/DL — SIGNIFICANT CHANGE UP (ref 2.5–4.5)
PLATELET # BLD AUTO: 241 K/UL — SIGNIFICANT CHANGE UP (ref 150–400)
PMV BLD: 11 FL — SIGNIFICANT CHANGE UP (ref 7–13)
POTASSIUM SERPL-MCNC: 3.3 MMOL/L — LOW (ref 3.5–5.3)
POTASSIUM SERPL-SCNC: 3.3 MMOL/L — LOW (ref 3.5–5.3)
RBC # BLD: 4.47 M/UL — SIGNIFICANT CHANGE UP (ref 3.8–5.2)
RBC # FLD: 13.2 % — SIGNIFICANT CHANGE UP (ref 10.3–14.5)
SODIUM SERPL-SCNC: 139 MMOL/L — SIGNIFICANT CHANGE UP (ref 135–145)
SPECIMEN SOURCE: SIGNIFICANT CHANGE UP
SPECIMEN SOURCE: SIGNIFICANT CHANGE UP
WBC # BLD: 7.01 K/UL — SIGNIFICANT CHANGE UP (ref 3.8–10.5)
WBC # FLD AUTO: 7.01 K/UL — SIGNIFICANT CHANGE UP (ref 3.8–10.5)

## 2025-08-14 PROCEDURE — 99232 SBSQ HOSP IP/OBS MODERATE 35: CPT

## 2025-08-14 RX ORDER — LIDOCAINE HCL/PF 10 MG/ML
5 VIAL (ML) INJECTION DAILY
Refills: 0 | Status: DISCONTINUED | OUTPATIENT
Start: 2025-08-14 | End: 2025-08-16

## 2025-08-14 RX ORDER — CYCLOBENZAPRINE HYDROCHLORIDE 15 MG/1
5 CAPSULE, EXTENDED RELEASE ORAL ONCE
Refills: 0 | Status: COMPLETED | OUTPATIENT
Start: 2025-08-14 | End: 2025-08-14

## 2025-08-14 RX ADMIN — Medication 40 MILLIGRAM(S): at 05:53

## 2025-08-14 RX ADMIN — ROSUVASTATIN CALCIUM 20 MILLIGRAM(S): 20 TABLET, FILM COATED ORAL at 22:28

## 2025-08-14 RX ADMIN — Medication 1 DROP(S): at 17:13

## 2025-08-14 RX ADMIN — Medication 650 MILLIGRAM(S): at 11:48

## 2025-08-14 RX ADMIN — CYCLOBENZAPRINE HYDROCHLORIDE 5 MILLIGRAM(S): 15 CAPSULE, EXTENDED RELEASE ORAL at 11:09

## 2025-08-14 RX ADMIN — HEPARIN SODIUM 5000 UNIT(S): 1000 INJECTION INTRAVENOUS; SUBCUTANEOUS at 17:12

## 2025-08-14 RX ADMIN — Medication 1 DROP(S): at 11:10

## 2025-08-14 RX ADMIN — Medication 650 MILLIGRAM(S): at 17:43

## 2025-08-14 RX ADMIN — Medication 650 MILLIGRAM(S): at 11:09

## 2025-08-14 RX ADMIN — BUTYROSPERMUM PARKII(SHEA BUTTER), SIMMONDSIA CHINENSIS (JOJOBA) SEED OIL, ALOE BARBADENSIS LEAF EXTRACT 250 MILLIGRAM(S): .01; 1; 3.5 LIQUID TOPICAL at 05:52

## 2025-08-14 RX ADMIN — Medication 1 DROP(S): at 05:53

## 2025-08-14 RX ADMIN — Medication 40 MILLIEQUIVALENT(S): at 11:09

## 2025-08-14 RX ADMIN — BUTYROSPERMUM PARKII(SHEA BUTTER), SIMMONDSIA CHINENSIS (JOJOBA) SEED OIL, ALOE BARBADENSIS LEAF EXTRACT 250 MILLIGRAM(S): .01; 1; 3.5 LIQUID TOPICAL at 17:12

## 2025-08-14 RX ADMIN — HEPARIN SODIUM 5000 UNIT(S): 1000 INJECTION INTRAVENOUS; SUBCUTANEOUS at 05:53

## 2025-08-14 RX ADMIN — TAMSULOSIN HYDROCHLORIDE 0.4 MILLIGRAM(S): 0.4 CAPSULE ORAL at 22:28

## 2025-08-14 RX ADMIN — METOPROLOL SUCCINATE 25 MILLIGRAM(S): 50 TABLET, EXTENDED RELEASE ORAL at 05:52

## 2025-08-14 RX ADMIN — Medication 75 MICROGRAM(S): at 05:52

## 2025-08-14 RX ADMIN — Medication 50 MILLIGRAM(S): at 22:28

## 2025-08-14 RX ADMIN — Medication 650 MILLIGRAM(S): at 17:17

## 2025-08-14 RX ADMIN — CLOPIDOGREL BISULFATE 75 MILLIGRAM(S): 75 TABLET, FILM COATED ORAL at 11:09

## 2025-08-15 ENCOUNTER — TRANSCRIPTION ENCOUNTER (OUTPATIENT)
Age: 85
End: 2025-08-15

## 2025-08-15 DIAGNOSIS — R33.8 OTHER RETENTION OF URINE: ICD-10-CM

## 2025-08-15 LAB
ANION GAP SERPL CALC-SCNC: 11 MMOL/L — SIGNIFICANT CHANGE UP (ref 5–17)
BUN SERPL-MCNC: 15 MG/DL — SIGNIFICANT CHANGE UP (ref 7–23)
CALCIUM SERPL-MCNC: 9.5 MG/DL — SIGNIFICANT CHANGE UP (ref 8.5–10.1)
CHLORIDE SERPL-SCNC: 108 MMOL/L — SIGNIFICANT CHANGE UP (ref 96–108)
CO2 SERPL-SCNC: 21 MMOL/L — LOW (ref 22–31)
CREAT SERPL-MCNC: 0.61 MG/DL — SIGNIFICANT CHANGE UP (ref 0.5–1.3)
EGFR: 87 ML/MIN/1.73M2 — SIGNIFICANT CHANGE UP
EGFR: 87 ML/MIN/1.73M2 — SIGNIFICANT CHANGE UP
GLUCOSE SERPL-MCNC: 137 MG/DL — HIGH (ref 70–99)
POTASSIUM SERPL-MCNC: 3.5 MMOL/L — SIGNIFICANT CHANGE UP (ref 3.5–5.3)
POTASSIUM SERPL-SCNC: 3.5 MMOL/L — SIGNIFICANT CHANGE UP (ref 3.5–5.3)
SODIUM SERPL-SCNC: 140 MMOL/L — SIGNIFICANT CHANGE UP (ref 135–145)

## 2025-08-15 PROCEDURE — 83605 ASSAY OF LACTIC ACID: CPT

## 2025-08-15 PROCEDURE — 83690 ASSAY OF LIPASE: CPT

## 2025-08-15 PROCEDURE — 87040 BLOOD CULTURE FOR BACTERIA: CPT

## 2025-08-15 PROCEDURE — 87640 STAPH A DNA AMP PROBE: CPT

## 2025-08-15 PROCEDURE — 84132 ASSAY OF SERUM POTASSIUM: CPT

## 2025-08-15 PROCEDURE — 84100 ASSAY OF PHOSPHORUS: CPT

## 2025-08-15 PROCEDURE — 85027 COMPLETE CBC AUTOMATED: CPT

## 2025-08-15 PROCEDURE — 97530 THERAPEUTIC ACTIVITIES: CPT

## 2025-08-15 PROCEDURE — 80053 COMPREHEN METABOLIC PANEL: CPT

## 2025-08-15 PROCEDURE — 87641 MR-STAPH DNA AMP PROBE: CPT

## 2025-08-15 PROCEDURE — 86850 RBC ANTIBODY SCREEN: CPT

## 2025-08-15 PROCEDURE — 83036 HEMOGLOBIN GLYCOSYLATED A1C: CPT

## 2025-08-15 PROCEDURE — 71250 CT THORAX DX C-: CPT

## 2025-08-15 PROCEDURE — 86901 BLOOD TYPING SEROLOGIC RH(D): CPT

## 2025-08-15 PROCEDURE — 80061 LIPID PANEL: CPT

## 2025-08-15 PROCEDURE — 81001 URINALYSIS AUTO W/SCOPE: CPT

## 2025-08-15 PROCEDURE — 71045 X-RAY EXAM CHEST 1 VIEW: CPT

## 2025-08-15 PROCEDURE — 70551 MRI BRAIN STEM W/O DYE: CPT

## 2025-08-15 PROCEDURE — 84145 PROCALCITONIN (PCT): CPT

## 2025-08-15 PROCEDURE — 97116 GAIT TRAINING THERAPY: CPT

## 2025-08-15 PROCEDURE — 85025 COMPLETE CBC W/AUTO DIFF WBC: CPT

## 2025-08-15 PROCEDURE — 82962 GLUCOSE BLOOD TEST: CPT

## 2025-08-15 PROCEDURE — 87086 URINE CULTURE/COLONY COUNT: CPT

## 2025-08-15 PROCEDURE — 86900 BLOOD TYPING SEROLOGIC ABO: CPT

## 2025-08-15 PROCEDURE — 0225U NFCT DS DNA&RNA 21 SARSCOV2: CPT

## 2025-08-15 PROCEDURE — 83735 ASSAY OF MAGNESIUM: CPT

## 2025-08-15 PROCEDURE — 70450 CT HEAD/BRAIN W/O DYE: CPT

## 2025-08-15 PROCEDURE — 97162 PT EVAL MOD COMPLEX 30 MIN: CPT

## 2025-08-15 PROCEDURE — 82272 OCCULT BLD FECES 1-3 TESTS: CPT

## 2025-08-15 PROCEDURE — 99222 1ST HOSP IP/OBS MODERATE 55: CPT

## 2025-08-15 PROCEDURE — 87186 SC STD MICRODIL/AGAR DIL: CPT

## 2025-08-15 PROCEDURE — 99232 SBSQ HOSP IP/OBS MODERATE 35: CPT

## 2025-08-15 PROCEDURE — 36415 COLL VENOUS BLD VENIPUNCTURE: CPT

## 2025-08-15 PROCEDURE — 80048 BASIC METABOLIC PNL TOTAL CA: CPT

## 2025-08-15 PROCEDURE — 74177 CT ABD & PELVIS W/CONTRAST: CPT

## 2025-08-15 PROCEDURE — 93005 ELECTROCARDIOGRAM TRACING: CPT

## 2025-08-15 RX ORDER — PHENAZOPYRIDINE HCL 100 MG
100 TABLET ORAL EVERY 8 HOURS
Refills: 0 | Status: DISCONTINUED | OUTPATIENT
Start: 2025-08-15 | End: 2025-08-16

## 2025-08-15 RX ORDER — TAMSULOSIN HYDROCHLORIDE 0.4 MG/1
1 CAPSULE ORAL
Qty: 0 | Refills: 0 | DISCHARGE
Start: 2025-08-15

## 2025-08-15 RX ORDER — BUTYROSPERMUM PARKII(SHEA BUTTER), SIMMONDSIA CHINENSIS (JOJOBA) SEED OIL, ALOE BARBADENSIS LEAF EXTRACT .01; 1; 3.5 G/100G; G/100G; G/100G
1 LIQUID TOPICAL
Qty: 0 | Refills: 0 | DISCHARGE
Start: 2025-08-15

## 2025-08-15 RX ORDER — TAMSULOSIN HYDROCHLORIDE 0.4 MG/1
1 CAPSULE ORAL
Refills: 0 | DISCHARGE

## 2025-08-15 RX ORDER — DULOXETINE 20 MG/1
1 CAPSULE, DELAYED RELEASE ORAL
Refills: 0 | DISCHARGE

## 2025-08-15 RX ORDER — MAGNESIUM, ALUMINUM HYDROXIDE 200-200 MG
30 TABLET,CHEWABLE ORAL
Qty: 0 | Refills: 0 | DISCHARGE
Start: 2025-08-15

## 2025-08-15 RX ORDER — CYCLOBENZAPRINE HYDROCHLORIDE 15 MG/1
5 CAPSULE, EXTENDED RELEASE ORAL THREE TIMES A DAY
Refills: 0 | Status: DISCONTINUED | OUTPATIENT
Start: 2025-08-15 | End: 2025-08-16

## 2025-08-15 RX ORDER — METOPROLOL SUCCINATE 50 MG/1
1 TABLET, EXTENDED RELEASE ORAL
Qty: 0 | Refills: 0 | DISCHARGE
Start: 2025-08-15

## 2025-08-15 RX ADMIN — Medication 1 DROP(S): at 16:02

## 2025-08-15 RX ADMIN — METOPROLOL SUCCINATE 25 MILLIGRAM(S): 50 TABLET, EXTENDED RELEASE ORAL at 06:13

## 2025-08-15 RX ADMIN — Medication 650 MILLIGRAM(S): at 17:30

## 2025-08-15 RX ADMIN — Medication 1 DROP(S): at 06:17

## 2025-08-15 RX ADMIN — Medication 1 DROP(S): at 11:03

## 2025-08-15 RX ADMIN — CLOPIDOGREL BISULFATE 75 MILLIGRAM(S): 75 TABLET, FILM COATED ORAL at 11:01

## 2025-08-15 RX ADMIN — TAMSULOSIN HYDROCHLORIDE 0.4 MILLIGRAM(S): 0.4 CAPSULE ORAL at 22:22

## 2025-08-15 RX ADMIN — Medication 50 MILLIGRAM(S): at 22:23

## 2025-08-15 RX ADMIN — Medication 40 MILLIGRAM(S): at 06:12

## 2025-08-15 RX ADMIN — Medication 650 MILLIGRAM(S): at 06:13

## 2025-08-15 RX ADMIN — Medication 650 MILLIGRAM(S): at 22:23

## 2025-08-15 RX ADMIN — ROSUVASTATIN CALCIUM 20 MILLIGRAM(S): 20 TABLET, FILM COATED ORAL at 22:22

## 2025-08-15 RX ADMIN — HEPARIN SODIUM 5000 UNIT(S): 1000 INJECTION INTRAVENOUS; SUBCUTANEOUS at 17:08

## 2025-08-15 RX ADMIN — CYCLOBENZAPRINE HYDROCHLORIDE 5 MILLIGRAM(S): 15 CAPSULE, EXTENDED RELEASE ORAL at 18:05

## 2025-08-15 RX ADMIN — Medication 650 MILLIGRAM(S): at 16:01

## 2025-08-15 RX ADMIN — Medication 1 DROP(S): at 22:24

## 2025-08-15 RX ADMIN — Medication 100 MILLIGRAM(S): at 16:01

## 2025-08-15 RX ADMIN — HEPARIN SODIUM 5000 UNIT(S): 1000 INJECTION INTRAVENOUS; SUBCUTANEOUS at 06:12

## 2025-08-15 RX ADMIN — Medication 75 MICROGRAM(S): at 06:12

## 2025-08-15 RX ADMIN — BUTYROSPERMUM PARKII(SHEA BUTTER), SIMMONDSIA CHINENSIS (JOJOBA) SEED OIL, ALOE BARBADENSIS LEAF EXTRACT 250 MILLIGRAM(S): .01; 1; 3.5 LIQUID TOPICAL at 16:01

## 2025-08-15 RX ADMIN — BUTYROSPERMUM PARKII(SHEA BUTTER), SIMMONDSIA CHINENSIS (JOJOBA) SEED OIL, ALOE BARBADENSIS LEAF EXTRACT 250 MILLIGRAM(S): .01; 1; 3.5 LIQUID TOPICAL at 06:12

## 2025-08-15 RX ADMIN — Medication 650 MILLIGRAM(S): at 23:23

## 2025-08-16 VITALS
TEMPERATURE: 98 F | RESPIRATION RATE: 18 BRPM | DIASTOLIC BLOOD PRESSURE: 75 MMHG | SYSTOLIC BLOOD PRESSURE: 109 MMHG | OXYGEN SATURATION: 99 % | HEART RATE: 90 BPM

## 2025-08-16 LAB
ANION GAP SERPL CALC-SCNC: 7 MMOL/L — SIGNIFICANT CHANGE UP (ref 5–17)
BASOPHILS # BLD AUTO: 0.06 K/UL — SIGNIFICANT CHANGE UP (ref 0–0.2)
BASOPHILS NFR BLD AUTO: 1 % — SIGNIFICANT CHANGE UP (ref 0–2)
BUN SERPL-MCNC: 18 MG/DL — SIGNIFICANT CHANGE UP (ref 7–23)
CALCIUM SERPL-MCNC: 8.9 MG/DL — SIGNIFICANT CHANGE UP (ref 8.5–10.1)
CHLORIDE SERPL-SCNC: 108 MMOL/L — SIGNIFICANT CHANGE UP (ref 96–108)
CO2 SERPL-SCNC: 26 MMOL/L — SIGNIFICANT CHANGE UP (ref 22–31)
CREAT SERPL-MCNC: 0.58 MG/DL — SIGNIFICANT CHANGE UP (ref 0.5–1.3)
EGFR: 88 ML/MIN/1.73M2 — SIGNIFICANT CHANGE UP
EGFR: 88 ML/MIN/1.73M2 — SIGNIFICANT CHANGE UP
EOSINOPHIL # BLD AUTO: 0.39 K/UL — SIGNIFICANT CHANGE UP (ref 0–0.5)
EOSINOPHIL NFR BLD AUTO: 6.7 % — HIGH (ref 0–6)
GLUCOSE SERPL-MCNC: 105 MG/DL — HIGH (ref 70–99)
HCT VFR BLD CALC: 36.7 % — SIGNIFICANT CHANGE UP (ref 34.5–45)
HGB BLD-MCNC: 11.3 G/DL — LOW (ref 11.5–15.5)
IMM GRANULOCYTES # BLD AUTO: 0.11 K/UL — HIGH (ref 0–0.07)
IMM GRANULOCYTES NFR BLD AUTO: 1.9 % — HIGH (ref 0–0.9)
LYMPHOCYTES # BLD AUTO: 1.63 K/UL — SIGNIFICANT CHANGE UP (ref 1–3.3)
LYMPHOCYTES NFR BLD AUTO: 28 % — SIGNIFICANT CHANGE UP (ref 13–44)
MCHC RBC-ENTMCNC: 27.2 PG — SIGNIFICANT CHANGE UP (ref 27–34)
MCHC RBC-ENTMCNC: 30.8 G/DL — LOW (ref 32–36)
MCV RBC AUTO: 88.2 FL — SIGNIFICANT CHANGE UP (ref 80–100)
MONOCYTES # BLD AUTO: 0.65 K/UL — SIGNIFICANT CHANGE UP (ref 0–0.9)
MONOCYTES NFR BLD AUTO: 11.2 % — SIGNIFICANT CHANGE UP (ref 2–14)
NEUTROPHILS # BLD AUTO: 2.98 K/UL — SIGNIFICANT CHANGE UP (ref 1.8–7.4)
NEUTROPHILS NFR BLD AUTO: 51.2 % — SIGNIFICANT CHANGE UP (ref 43–77)
NRBC # BLD AUTO: 0 K/UL — SIGNIFICANT CHANGE UP (ref 0–0)
NRBC # FLD: 0 K/UL — SIGNIFICANT CHANGE UP (ref 0–0)
NRBC BLD AUTO-RTO: 0 /100 WBCS — SIGNIFICANT CHANGE UP (ref 0–0)
PLATELET # BLD AUTO: 337 K/UL — SIGNIFICANT CHANGE UP (ref 150–400)
PMV BLD: 9.8 FL — SIGNIFICANT CHANGE UP (ref 7–13)
POTASSIUM SERPL-MCNC: 3.6 MMOL/L — SIGNIFICANT CHANGE UP (ref 3.5–5.3)
POTASSIUM SERPL-SCNC: 3.6 MMOL/L — SIGNIFICANT CHANGE UP (ref 3.5–5.3)
RBC # BLD: 4.16 M/UL — SIGNIFICANT CHANGE UP (ref 3.8–5.2)
RBC # FLD: 13.6 % — SIGNIFICANT CHANGE UP (ref 10.3–14.5)
SODIUM SERPL-SCNC: 141 MMOL/L — SIGNIFICANT CHANGE UP (ref 135–145)
WBC # BLD: 5.82 K/UL — SIGNIFICANT CHANGE UP (ref 3.8–10.5)
WBC # FLD AUTO: 5.82 K/UL — SIGNIFICANT CHANGE UP (ref 3.8–10.5)

## 2025-08-16 PROCEDURE — 82272 OCCULT BLD FECES 1-3 TESTS: CPT

## 2025-08-16 PROCEDURE — 85027 COMPLETE CBC AUTOMATED: CPT

## 2025-08-16 PROCEDURE — 87640 STAPH A DNA AMP PROBE: CPT

## 2025-08-16 PROCEDURE — 86850 RBC ANTIBODY SCREEN: CPT

## 2025-08-16 PROCEDURE — 86901 BLOOD TYPING SEROLOGIC RH(D): CPT

## 2025-08-16 PROCEDURE — 84100 ASSAY OF PHOSPHORUS: CPT

## 2025-08-16 PROCEDURE — 86900 BLOOD TYPING SEROLOGIC ABO: CPT

## 2025-08-16 PROCEDURE — 80053 COMPREHEN METABOLIC PANEL: CPT

## 2025-08-16 PROCEDURE — 83605 ASSAY OF LACTIC ACID: CPT

## 2025-08-16 PROCEDURE — 70551 MRI BRAIN STEM W/O DYE: CPT

## 2025-08-16 PROCEDURE — 71045 X-RAY EXAM CHEST 1 VIEW: CPT

## 2025-08-16 PROCEDURE — 96374 THER/PROPH/DIAG INJ IV PUSH: CPT

## 2025-08-16 PROCEDURE — 36415 COLL VENOUS BLD VENIPUNCTURE: CPT

## 2025-08-16 PROCEDURE — 80061 LIPID PANEL: CPT

## 2025-08-16 PROCEDURE — 87186 SC STD MICRODIL/AGAR DIL: CPT

## 2025-08-16 PROCEDURE — 84145 PROCALCITONIN (PCT): CPT

## 2025-08-16 PROCEDURE — 96375 TX/PRO/DX INJ NEW DRUG ADDON: CPT

## 2025-08-16 PROCEDURE — 99285 EMERGENCY DEPT VISIT HI MDM: CPT | Mod: 25

## 2025-08-16 PROCEDURE — 87086 URINE CULTURE/COLONY COUNT: CPT

## 2025-08-16 PROCEDURE — 83735 ASSAY OF MAGNESIUM: CPT

## 2025-08-16 PROCEDURE — 97162 PT EVAL MOD COMPLEX 30 MIN: CPT

## 2025-08-16 PROCEDURE — 84132 ASSAY OF SERUM POTASSIUM: CPT

## 2025-08-16 PROCEDURE — 71250 CT THORAX DX C-: CPT

## 2025-08-16 PROCEDURE — 97116 GAIT TRAINING THERAPY: CPT

## 2025-08-16 PROCEDURE — 87641 MR-STAPH DNA AMP PROBE: CPT

## 2025-08-16 PROCEDURE — 83690 ASSAY OF LIPASE: CPT

## 2025-08-16 PROCEDURE — 99232 SBSQ HOSP IP/OBS MODERATE 35: CPT

## 2025-08-16 PROCEDURE — 85025 COMPLETE CBC W/AUTO DIFF WBC: CPT

## 2025-08-16 PROCEDURE — 96361 HYDRATE IV INFUSION ADD-ON: CPT

## 2025-08-16 PROCEDURE — 83036 HEMOGLOBIN GLYCOSYLATED A1C: CPT

## 2025-08-16 PROCEDURE — 81001 URINALYSIS AUTO W/SCOPE: CPT

## 2025-08-16 PROCEDURE — 87040 BLOOD CULTURE FOR BACTERIA: CPT

## 2025-08-16 PROCEDURE — 80048 BASIC METABOLIC PNL TOTAL CA: CPT

## 2025-08-16 PROCEDURE — 97530 THERAPEUTIC ACTIVITIES: CPT

## 2025-08-16 PROCEDURE — 82962 GLUCOSE BLOOD TEST: CPT

## 2025-08-16 PROCEDURE — 70450 CT HEAD/BRAIN W/O DYE: CPT

## 2025-08-16 PROCEDURE — 0225U NFCT DS DNA&RNA 21 SARSCOV2: CPT

## 2025-08-16 PROCEDURE — 93005 ELECTROCARDIOGRAM TRACING: CPT

## 2025-08-16 PROCEDURE — 74177 CT ABD & PELVIS W/CONTRAST: CPT

## 2025-08-16 RX ORDER — CYCLOBENZAPRINE HYDROCHLORIDE 15 MG/1
1 CAPSULE, EXTENDED RELEASE ORAL
Qty: 0 | Refills: 0 | DISCHARGE
Start: 2025-08-16

## 2025-08-16 RX ORDER — PHENAZOPYRIDINE HCL 100 MG
1 TABLET ORAL
Qty: 0 | Refills: 0 | DISCHARGE
Start: 2025-08-16

## 2025-08-16 RX ADMIN — CYCLOBENZAPRINE HYDROCHLORIDE 5 MILLIGRAM(S): 15 CAPSULE, EXTENDED RELEASE ORAL at 05:54

## 2025-08-16 RX ADMIN — Medication 40 MILLIGRAM(S): at 05:53

## 2025-08-16 RX ADMIN — CYCLOBENZAPRINE HYDROCHLORIDE 5 MILLIGRAM(S): 15 CAPSULE, EXTENDED RELEASE ORAL at 10:28

## 2025-08-16 RX ADMIN — Medication 1 DROP(S): at 11:07

## 2025-08-16 RX ADMIN — BUTYROSPERMUM PARKII(SHEA BUTTER), SIMMONDSIA CHINENSIS (JOJOBA) SEED OIL, ALOE BARBADENSIS LEAF EXTRACT 250 MILLIGRAM(S): .01; 1; 3.5 LIQUID TOPICAL at 05:53

## 2025-08-16 RX ADMIN — CLOPIDOGREL BISULFATE 75 MILLIGRAM(S): 75 TABLET, FILM COATED ORAL at 11:07

## 2025-08-16 RX ADMIN — Medication 1 DROP(S): at 05:53

## 2025-08-16 RX ADMIN — Medication 75 MICROGRAM(S): at 05:54

## 2025-08-16 RX ADMIN — HEPARIN SODIUM 5000 UNIT(S): 1000 INJECTION INTRAVENOUS; SUBCUTANEOUS at 05:53

## 2025-08-20 RX ORDER — ALPRAZOLAM 0.5 MG
1 TABLET, EXTENDED RELEASE 24 HR ORAL
Refills: 0
Start: 2025-08-20

## (undated) DEVICE — NDL INJ SCLERO INTERJECT 23G

## (undated) DEVICE — CLAMP BX HOT RAD JAW 3

## (undated) DEVICE — SNARE POLYP SENS 27MM 240CM

## (undated) DEVICE — POLY TRAP ETRAP

## (undated) DEVICE — DRSG CURITY GAUZE SPONGE 4 X 4" 12-PLY

## (undated) DEVICE — CATH ELCTR GLIDE PRB 7FR

## (undated) DEVICE — FORCEP RADIAL JAW 4 W NDL 2.2MM 2.8MM 160CM YELLOW DISP

## (undated) DEVICE — SYR LUER SLIP TIP 30CC

## (undated) DEVICE — BRUSH COLONOSCOPY CYTOLOGY

## (undated) DEVICE — CATH ELECHMSTAT  INJ 7FR 210CM

## (undated) DEVICE — TUBING CANNULA SALTER LABS NASAL ADULT 7FT

## (undated) DEVICE — MASK O2 ADLT POM ELITE W/ MED AND HI CONCEN M TO M 10FT

## (undated) DEVICE — SET IV PUMP BLOOD 1VALVE 180FILTER NON-DEHP

## (undated) DEVICE — FORMALIN CUPS 10% BUFFERED

## (undated) DEVICE — PACK IV START WITH CHG

## (undated) DEVICE — SYR LUER SLIP TIP 50CC

## (undated) DEVICE — SYR LUER LOK 30CC

## (undated) DEVICE — SENSOR O2 FINGER XL ADULT 24/BX 6BX/CA

## (undated) DEVICE — STERIS DEFENDO 3-PIECE KIT (AIR/WATER, SUCTION & BIOPSY VALVES)

## (undated) DEVICE — SYR IV POSIFLUSH NS 3ML 30/TY

## (undated) DEVICE — TRAP SPECIMEN SPUTUM 40CC

## (undated) DEVICE — TUBING IV SET SECONDARY 34"

## (undated) DEVICE — TUBING IV SET GRAVITY 3Y 100" MACRO

## (undated) DEVICE — VALVE BIOPSY

## (undated) DEVICE — BITE BLOCK MAXI RUBBER STAMP

## (undated) DEVICE — FORCEP RADIAL JAW 4 W NDL 2.4MM 2.8MM 240CM ORANGE DISP

## (undated) DEVICE — RADIAL JAW 4 LG CAPACITY WITH NDL

## (undated) DEVICE — TUBE RECTAL 24FR

## (undated) DEVICE — SUT HEWSON RETRIEVER

## (undated) DEVICE — CANISTER SUCTION 1200CC 10/SL

## (undated) DEVICE — MARKER ENDO SPOT EX

## (undated) DEVICE — SOL IRR NS 0.9% 250ML

## (undated) DEVICE — SOL IRR POUR H2O 500ML

## (undated) DEVICE — SUCTION YANKAUER TAPERED BULBOUS NO VENT

## (undated) DEVICE — ENDOCUFF VISION SZ 3 SM PRPL

## (undated) DEVICE — CATH IV SAFE BC 20G X 1.16" (PINK)

## (undated) DEVICE — MASK O2 NON REBREATH 3IN1 ADULT

## (undated) DEVICE — TUBING IV SET SECOND 34" W/O LOK-BLUNT

## (undated) DEVICE — TUBE O2 SUPL CRUSH RESIS CONN SOUTHSIDE ONLY

## (undated) DEVICE — SYR ALLIANCE II INFLATION 60ML

## (undated) DEVICE — BRUSH CYTO ENDO

## (undated) DEVICE — SNARE LRG

## (undated) DEVICE — TUBING SUCTION CONN 6FT STERILE

## (undated) DEVICE — FORCEP RADIAL JAW 4 JUMBO 2.8MM 3.2MM 240CM ORANGE DISP

## (undated) DEVICE — RETRIEVER ROTH NET PLATINUM-UNIVERSAL

## (undated) DEVICE — ELCTR GROUNDING PAD ADULT COVIDIEN

## (undated) DEVICE — ENDOCUFF VISION SZ 2 LG GRN

## (undated) DEVICE — MASK LRG MED AND HIGH O2 CONC M TO M 10FT

## (undated) DEVICE — TRAP QUICK CATCH  SINGL CHAMBER

## (undated) DEVICE — CATH IV SAFE BC 22G X 1" (BLUE)

## (undated) DEVICE — Device